# Patient Record
Sex: FEMALE | Race: BLACK OR AFRICAN AMERICAN | Employment: UNEMPLOYED | URBAN - METROPOLITAN AREA
[De-identification: names, ages, dates, MRNs, and addresses within clinical notes are randomized per-mention and may not be internally consistent; named-entity substitution may affect disease eponyms.]

---

## 2017-01-10 ENCOUNTER — NURSE NAVIGATOR (OUTPATIENT)
Dept: INTERNAL MEDICINE CLINIC | Age: 32
End: 2017-01-10

## 2017-01-10 NOTE — PROGRESS NOTES
This writer has attempted to reach pt for a final follow up to Bill discharge report. Pt was discharged from [facility] for Abscess of Right Buttock. Pt wasn't available nor has she responded to this writers get in touch letter. She has attended f/u with PCP and surgeon. This writer will close this episode.

## 2017-01-19 ENCOUNTER — APPOINTMENT (OUTPATIENT)
Dept: GENERAL RADIOLOGY | Age: 32
End: 2017-01-19
Attending: EMERGENCY MEDICINE
Payer: MEDICAID

## 2017-01-19 ENCOUNTER — HOSPITAL ENCOUNTER (EMERGENCY)
Age: 32
Discharge: HOME | End: 2017-01-19
Attending: EMERGENCY MEDICINE
Payer: MEDICAID

## 2017-01-19 VITALS
DIASTOLIC BLOOD PRESSURE: 66 MMHG | RESPIRATION RATE: 18 BRPM | SYSTOLIC BLOOD PRESSURE: 130 MMHG | BODY MASS INDEX: 21.82 KG/M2 | HEIGHT: 67 IN | OXYGEN SATURATION: 100 % | WEIGHT: 139 LBS | HEART RATE: 94 BPM | TEMPERATURE: 98.2 F

## 2017-01-19 DIAGNOSIS — S29.019A THORACIC MYOFASCIAL STRAIN, INITIAL ENCOUNTER: ICD-10-CM

## 2017-01-19 DIAGNOSIS — H65.112 ACUTE MUCOID OTITIS MEDIA OF LEFT EAR: Primary | ICD-10-CM

## 2017-01-19 PROCEDURE — 74011250637 HC RX REV CODE- 250/637: Performed by: EMERGENCY MEDICINE

## 2017-01-19 PROCEDURE — 99283 EMERGENCY DEPT VISIT LOW MDM: CPT

## 2017-01-19 PROCEDURE — 72072 X-RAY EXAM THORAC SPINE 3VWS: CPT

## 2017-01-19 RX ORDER — AZITHROMYCIN 250 MG/1
TABLET, FILM COATED ORAL
Qty: 6 TAB | Refills: 0 | Status: SHIPPED | OUTPATIENT
Start: 2017-01-19 | End: 2017-01-24

## 2017-01-19 RX ORDER — ACETAMINOPHEN 325 MG/1
650 TABLET ORAL ONCE
Status: COMPLETED | OUTPATIENT
Start: 2017-01-19 | End: 2017-01-19

## 2017-01-19 RX ORDER — METHOCARBAMOL 500 MG/1
500 TABLET, FILM COATED ORAL
Qty: 10 TAB | Refills: 0 | Status: SHIPPED | OUTPATIENT
Start: 2017-01-19 | End: 2017-01-24

## 2017-01-19 RX ADMIN — ACETAMINOPHEN 650 MG: 325 TABLET ORAL at 21:27

## 2017-01-20 NOTE — ED NOTES
Seen for complaints of pain to both ears x couple months. Denies drainage coming from ears. Denies decrease in hearing. Also complains of pain to mid upper back post fall last night. States she was running to bathroom, slipped and fell hitting back on floor. States she hit back of head. Denies pain to head or issues with memory, N+V. Pain to posterior neck and upper back. Denies other complaints. Emergency Department Nursing Plan of Care       The Nursing Plan of Care is developed from the Nursing assessment and Emergency Department Attending provider initial evaluation. The plan of care may be reviewed in the ED Provider note.     The Plan of Care was developed with the following considerations:   Patient / Family readiness to learn indicated by:verbalized understanding  Persons(s) to be included in education: patient  Barriers to Learning/Limitations:No    Signed     Tiffany Mirza    1/19/2017   2104

## 2017-01-20 NOTE — DISCHARGE INSTRUCTIONS
Scoliosis in Children: Care Instructions  Your Care Instructions  A normal spine--which is the line of bones going down your back--is usually straight or slightly curved. In scoliosis, the spine curves from side to side, often in an S or C shape. It may also be twisted. Scoliosis can affect adults, but it usually is found in children, especially girls between the ages of 8 and 12. Scoliosis can limit your child's growth. In very bad cases, your child's lungs may not be able to hold enough air. That can cause the heart to work harder to pump blood. Young people who have scoliosis usually do not have symptoms, but some may have back pain. If your child has mild scoliosis, he or she may need only to see a doctor every 4 to 6 months to make sure the curve is not getting worse. A child who has moderate scoliosis may need a brace. A brace usually stops the curve from getting worse, but it is not able to correct or straighten the spine. Scoliosis that is very bad may need surgery. Scoliosis and its treatment can be hard on a child. He or she may be embarrassed by wearing a brace. Think about taking your child to a scoliosis clinic, where other children are being treated. It may help your child cope with the condition. Follow-up care is a key part of your child's treatment and safety. Be sure to make and go to all appointments, and call your doctor if your child is having problems. It's also a good idea to know your child's test results and keep a list of the medicines your child takes. How can you care for your child at home? · Keep follow-up visits with your child's doctor. · If your child has a brace, follow instructions for wearing it. · Offer your child lots of hugs and emotional support. A child, especially a teen, who wears a brace may feel bad about himself or herself. If your child seems very sad or depressed for a long time, have your child talk to a counselor. · Be safe with medicines.  Read and follow all instructions on the label. ¨ If the doctor gave your child a prescription medicine for pain, give it as prescribed. ¨ If your child is not taking a prescription pain medicine, ask your doctor if your child can take an over-the-counter medicine. · Do not give your child two or more pain medicines at the same time unless the doctor told you to. Many pain medicines have acetaminophen, which is Tylenol. Too much acetaminophen (Tylenol) can be harmful. · Ask your doctor about what type of daily activity is safe for your child. When should you call for help? Call your doctor now or seek immediate medical care if:  · Your child has new or worse symptoms in arms, legs, chest, belly, or buttocks. Symptoms may include:  ¨ Numbness or tingling. ¨ Weakness. ¨ Pain. · Your child loses bladder or bowel control. Watch closely for changes in your child's health, and be sure to contact your doctor if:  · Your child is not getting better as expected. · Your child has a brace and has any problems wearing it. Where can you learn more? Go to http://roula-susan.info/. Enter C587 in the search box to learn more about \"Scoliosis in Children: Care Instructions. \"  Current as of: May 23, 2016  Content Version: 11.1  © 1363-1812 [employer]. Care instructions adapted under license by [employer] (which disclaims liability or warranty for this information). If you have questions about a medical condition or this instruction, always ask your healthcare professional. [place] [address] any warranty or liability for your use of this information. Bruises: Care Instructions  Your Care Instructions    Bruises occur when small blood vessels under the skin tear or rupture, most often from a twist, bump, or fall.  Blood leaks into tissues under the skin and causes a black-and-blue spot that often turns colors, including purplish black, reddish blue, or yellowish green, as the bruise heals. Bruises hurt, but most are not serious and will go away on their own within 2 to 4 weeks. Sometimes, gravity causes them to spread down the body. A leg bruise usually will take longer to heal than a bruise on the face or arms. Follow-up care is a key part of your treatment and safety. Be sure to make and go to all appointments, and call your doctor if you are having problems. Its also a good idea to know your test results and keep a list of the medicines you take. How can you care for yourself at home? · Take pain medicines exactly as directed. ¨ If the doctor gave you a prescription medicine for pain, take it as prescribed. ¨ If you are not taking a prescription pain medicine, ask your doctor if you can take an over-the-counter medicine. · Put ice or a cold pack on the area for 10 to 20 minutes at a time. Put a thin cloth between the ice and your skin. · If you can, prop up the bruised area on pillows as much as possible for the next few days. Try to keep the bruise above the level of your heart. When should you call for help? Call your doctor now or seek immediate medical care if:  · You have signs of infection, such as:  ¨ Increased pain, swelling, warmth, or redness. ¨ Red streaks leading from the bruise. ¨ Pus draining from the bruise. ¨ A fever. · You have a bruise on your leg and signs of a blood clot, such as:  ¨ Increasing redness and swelling along with warmth, tenderness, and pain in the bruised area. ¨ Pain in your calf, back of the knee, thigh, or groin. ¨ Redness and swelling in your leg or groin. · Your pain gets worse. Watch closely for changes in your health, and be sure to contact your doctor if:  · You do not get better as expected. Where can you learn more? Go to http://roula-susan.info/. Enter [phone] in the search box to learn more about \"Bruises: Care Instructions. \"  Current as of: May 27, 2016  Content Version: 11.1  © 3365-0430 [employer]. Care instructions adapted under license by [employer] (which disclaims liability or warranty for this information). If you have questions about a medical condition or this instruction, always ask your healthcare professional. [address] any warranty or liability for your use of this information. Ear Infection (Otitis Media): Care Instructions  Your Care Instructions    An ear infection may start with a cold and affect the middle ear (otitis media). It can hurt a lot. Most ear infections clear up on their own in a couple of days. Most often you will not need antibiotics. This is because many ear infections are caused by a virus. Antibiotics don't work against a virus. Regular doses of pain medicines are the best way to reduce your fever and help you feel better. Follow-up care is a key part of your treatment and safety. Be sure to make and go to all appointments, and call your doctor if you are having problems. It's also a good idea to know your test results and keep a list of the medicines you take. How can you care for yourself at home? · Take pain medicines exactly as directed. ¨ If the doctor gave you a prescription medicine for pain, take it as prescribed. ¨ If you are not taking a prescription pain medicine, take an over-the-counter medicine, such as acetaminophen (Tylenol), ibuprofen (Advil, Motrin), or naproxen (Aleve). Read and follow all instructions on the label. ¨ Do not take two or more pain medicines at the same time unless the doctor told you to. Many pain medicines have acetaminophen, which is Tylenol. Too much acetaminophen (Tylenol) can be harmful. · Plan to take a full dose of pain reliever before bedtime. Getting enough sleep will help you get better. · Try a warm, moist washcloth on the ear. It may help relieve pain. · If your doctor prescribed antibiotics, take them as directed.  Do not stop taking them just because you feel better. You need to take the full course of antibiotics. When should you call for help? Call your doctor now or seek immediate medical care if:  · You have new or increasing ear pain. · You have new or increasing pus or blood draining from your ear. · You have a fever with a stiff neck or a severe headache. Watch closely for changes in your health, and be sure to contact your doctor if:  · You have new or worse symptoms. · You are not getting better after taking an antibiotic for 2 days. Where can you learn more? Go to http://roula-susan.info/. Enter V529 in the search box to learn more about \"Ear Infection (Otitis Media): Care Instructions. \"  Current as of: July 29, 2016  Content Version: 11.1  © 6827-2831 [employer]. Care instructions adapted under license by [employer] (which disclaims liability or warranty for this information). If you have questions about a medical condition or this instruction, always ask your healthcare professional. [address] any warranty or liability for your use of this information.

## 2017-01-20 NOTE — ED PROVIDER NOTES
HPI Comments: Kurtis Mead is a 27 y.o. F with PMHx significant for Depression / Bipolar disorder / Anemia  who presents ambulatory to [facility] ED c/o BL ear pain radiating down her neck x 2 months. Pt  endorsing mid-back and left arm pain s/p fall yesterday in her shower. Pt states she was running to bathroom, slipped and fell hitting her head on the back of the floor. She reports taking ibuprofen with no relief of sx. Pt states she was previously in ED for otitis media for which she finished Clindamycin. She specifically denies any ear drainage, decrease in hearing, issues with memory, fevers, chills, nausea, vomiting, chest pain, shortness of breath, headache, rash, diarrhea, sweating or weight loss. PCP: Jacqueline Canales, NP    There are no other complaints, changes, or physical findings at this time. The history is provided by the patient.         Past Medical History:   Diagnosis Date    Abscess 12/1/2016    Anemia NEC     Bipolar 1 disorder ([address]     Breast tenderness      bilateral, since 11/2016, on and off     Depression     Dizziness     Gastrointestinal disorder      pancreatitis    Increased frequency of headaches     Kidney stones 10/2012    Nausea     Nipple discharge 2009     white/clear discharge since birth of child     Other ill-defined conditions(799.89)      sickle cell trait    Other ill-defined conditions(799.89)      anemia    Psychiatric disorder      depression, bi-polar    Psychotic disorder      bipolar 1    Sickle cell trait ([address]     Sickle cell trait ([address]     Stomach pain        Past Surgical History:   Procedure Laterality Date    Hx gyn       tubaligation    Hx gyn       ectopic pregnancy surgery    Hx other surgical       hx of blood transfusions         Family History:   Problem Relation Age of Onset    Heart Disease Father        Social History     Social History    Marital status: SINGLE     Spouse name: N/A    Number of children: N/A    Years of education: N/A     Occupational History    Not on file. Social History Main Topics    Smoking status: Current Every Day Smoker     Packs/day: 0.25     Years: 9.00    Smokeless tobacco: Never Used    Alcohol use No    Drug use: No      Comment: occasional    Sexual activity: Not Currently     Partners: Male     Birth control/ protection: None     Other Topics Concern    Not on file     Social History Narrative         ALLERGIES: Latex and Zyrtec [cetirizine]    Review of Systems   Constitutional: Negative. Negative for activity change, appetite change, chills, diaphoresis, fatigue, fever and unexpected weight change. HENT: Positive for ear pain. Negative for congestion, ear discharge, hearing loss, rhinorrhea, sneezing and voice change. Eyes: Negative. Negative for pain and visual disturbance. Respiratory: Negative. Negative for apnea, cough, choking, chest tightness and shortness of breath. Cardiovascular: Negative. Negative for chest pain and palpitations. Gastrointestinal: Negative. Negative for abdominal distention, abdominal pain, blood in stool, diarrhea, nausea and vomiting. Genitourinary: Negative. Negative for difficulty urinating, flank pain, frequency and urgency. No discharge   Musculoskeletal: Positive for back pain and neck pain. Negative for arthralgias, myalgias and neck stiffness. Positive for left arm pain   Skin: Negative. Negative for color change and rash. Neurological: Negative. Negative for dizziness, seizures, syncope, speech difficulty, weakness, numbness and headaches. Negative for issues with memory   Hematological: Negative for adenopathy. Psychiatric/Behavioral: Negative. Negative for agitation, behavioral problems, dysphoric mood and suicidal ideas. The patient is not nervous/anxious. All other systems reviewed and are negative.       Vitals:    01/19/17 2100   BP: 130/66   Pulse: 94   Resp: 18   Temp: 98.2 °F (36.8 °C)   SpO2: 100%   Weight: 63 kg (139 lb)   Height: 5' 7\" (1.702 m)            Physical Exam   Nursing note and vitals reviewed. Physical Examination: General appearance - WDWN, in no apparent distress  Head - NC/AT  Eyes - pupils equal, round  and reactive, extraocular eye movements intact, conj/sclera clear, anicteric  Mouth - mucous membranes moist, pharynx normal without lesions  Nose/Ears - nares clear, Right Tm & canals clear, Left TM has pus behind eardrum with erythema  Neck - supple, no significant adenopathy, trachea midline, no crepitus, c spine diffusely non-tender, no step offs  Chest - Normal respiratory effort, clear to auscultation bilaterally, no wheezes/rales/rhonchi  Heart - normal rate and regular rhythm, S1 and S2 normal, no murmurs, gallops, or rubs  Abdomen - soft, nontender, nondistended, nabs, no masses, guarding, rebound or rigidity  Neurological - alert, oriented, normal speech, cranial nerves intact, no focal motor findings, motor & sensory diffusely intact, normal gait  Extremities/MS - peripheral pulses normal, no pedal edema, all joints atraumatic, FROM, thoracic spine tenderness, no gross deformities  Skin - normal coloration and turgor, no rashes, no lesions or lacerations    MDM  Number of Diagnoses or Management Options  Acute mucoid otitis media of left ear:   Thoracic myofascial strain, initial encounter:   Diagnosis management comments: DDx: Muscle strain, Contusion, Fracture, Otitis media. Amount and/or Complexity of Data Reviewed  Tests in the radiology section of CPT®: ordered and reviewed  Review and summarize past medical records: yes    Patient Progress  Patient progress: stable    ED Course       Procedures    IMAGING RESULTS:  XR SPINE THORAC 3 V   Final Result   EXAM: XR SPINE THORAC 3 V     INDICATION: Back Pain     COMPARISON: None.     FINDINGS: AP, lateral and swimmers lateral views of the thoracic spine  demonstrate a mild sigmoid scoliosis.  There is no significant degenerative  change. No fracture or subluxation is identified.     IMPRESSION  IMPRESSION: No acute osseous abnormality. MEDICATIONS GIVEN:  Medications   acetaminophen (TYLENOL) tablet 650 mg (650 mg Oral Given 17)       IMPRESSION:  1. Acute mucoid otitis media of left ear    2. Thoracic myofascial strain, initial encounter        PLAN:  1. Discharge Medication List as of 2017  9:56 PM      START taking these medications    Details   azithromycin (ZITHROMAX Z-YANICK) 250 mg tablet As directed, Print, Disp-6 Tab, R-0      methocarbamol (ROBAXIN) 500 mg tablet Take 1 Tab by mouth two (2) times daily as needed for up to 5 days. , Print, Disp-10 Tab, R-0         CONTINUE these medications which have NOT CHANGED    Details   ibuprofen (MOTRIN) 800 mg tablet Take 1 Tab by mouth every eight (8) hours as needed for Pain., Normal, Disp-20 Tab, R-0      fluticasone (FLONASE) 50 mcg/actuation nasal spray 2 Sprays by Both Nostrils route daily. Indications: ALLERGIC RHINITIS, Normal, Disp-1 Bottle, R-11      loratadine (CLARITIN) 10 mg tablet Take 1 Tab by mouth daily. , Normal, Disp-30 Tab, R-11         STOP taking these medications       amoxicillin-clavulanate (AUGMENTIN) 250-62.5 mg/5 mL suspension Comments:   Reason for Stopping:         clindamycin (CLEOCIN) 300 mg capsule Comments:   Reason for Stopping:         acetaminophen-codeine (TYLENOL #3) 300-30 mg per tablet Comments:   Reason for Stopping:         cyproheptadine (PERIACTIN) 4 mg tablet Comments:   Reason for Stoppin.   Follow-up Information     Follow up With Details Comments Contact Info    Larry Fletcher NP Schedule an appointment as soon as possible for a visit As needed [address] [place] [address]  [phone]          Return to ED if worse     DISCHARGE NOTE:  10:02 PM  The patient's results have been reviewed with family and/or caregiver.  They verbally convey their understanding and agreement of the patient's signs, symptoms, diagnosis, treatment, and prognosis. They additionally agree to follow up as recommended in the discharge instructions or to return to the Emergency Room should the patient's condition change prior to their follow-up appointment. The family and/or caregiver verbally agrees with the care-plan and all of their questions have been answered. The discharge instructions have also been provided to the them along with educational information regarding the patient's diagnosis and a list of reasons why the patient would want to return to the ER prior to their follow-up appointment should their condition change. Written by Eliana Artis. Gurinder Huddleston, FRANDY Scribe, as dictated by Lisa Soto. Haven Mancia MD.        Attestations: This note is prepared by Eliana Artis. Chris, acting as Scribe for Lisa Soto. Haven Mancia, [facility] [place] [facility]. Haven Mancia MD: The scribe's documentation has been prepared under my direction and personally reviewed by me in its entirety. I confirm that the note above accurately reflects all work, treatment, procedures, and medical decision making performed by me.

## 2017-03-21 ENCOUNTER — OFFICE VISIT (OUTPATIENT)
Dept: INTERNAL MEDICINE CLINIC | Age: 32
End: 2017-03-21

## 2017-03-21 VITALS
TEMPERATURE: 97.2 F | OXYGEN SATURATION: 100 % | HEART RATE: 88 BPM | WEIGHT: 146.4 LBS | HEIGHT: 67 IN | SYSTOLIC BLOOD PRESSURE: 98 MMHG | BODY MASS INDEX: 22.98 KG/M2 | RESPIRATION RATE: 18 BRPM | DIASTOLIC BLOOD PRESSURE: 67 MMHG

## 2017-03-21 DIAGNOSIS — Z00.00 ADULT GENERAL MEDICAL EXAMINATION: Primary | ICD-10-CM

## 2017-03-21 DIAGNOSIS — R29.898 LEFT LEG WEAKNESS: ICD-10-CM

## 2017-03-21 DIAGNOSIS — Z91.81 HISTORY OF RECENT FALL: ICD-10-CM

## 2017-03-21 DIAGNOSIS — E61.1 IRON DEFICIENCY: ICD-10-CM

## 2017-03-21 RX ORDER — IBUPROFEN 800 MG/1
800 TABLET ORAL
Qty: 20 TAB | Refills: 0 | Status: SHIPPED | OUTPATIENT
Start: 2017-03-21 | End: 2017-12-03

## 2017-03-21 NOTE — PROGRESS NOTES
Donal Diaz is a 27 y.o. female and presents with ED Follow-up (1/19/17 ear and back pain RCHED); Annual Exam (with labs); and Ear Pain (pt states that she's still having ear infections. .. pt states that ENT is too far out)    Subjective:  Donal Diaz is a 27 y.o. female presenting for annual checkup. ROS: Feeling well. No dyspnea or chest pain on exertion. No abdominal pain, change in bowel habits, black or bloody stools. No urinary tract or prostatic symptoms. No neurological complaints. Specific concerns today: seen in ER in Jan for fall and ear pain. Informed of scoliosis and has intermittent right mid back discomfort at time. Referred to ENT, but feels it is too far out, got lost with directions. Also with concern for left leg weakness, had fall down steps recently. Discussed ADVANCED DIRECTIVE:yes  Advanced Directive on File: no    Review of Systems  Constitutional: negative for fevers, chills, anorexia and weight loss  Eyes:   negative for visual disturbance, drainage, and irritation  ENT:   + NC,hoarseness, and ear congestion. negative for tinnitus,sore throat   Respiratory:  + cough. negative for  hemoptysis, dyspnea, and wheezing  CV:   negative for chest pain, palpitations, and lower extremity edema  GI:   negative for nausea, vomiting, diarrhea, abdominal pain, and melena  Endo:               negative for polyuria,polydipsia,polyphagia, and heat intolerance  Genitourinary: negative for frequency, urgency, dysuria, retention, and hematuria  Integument:  negative for rash, ulcerations, and pruritus  Hematologic:  +easy bruising. negative for bleeding  Musculoskel: negative for muscle weakness,and joint pain/swelling  Neurological:  negative for headaches, dizziness, vertigo,and memory/gait problems  Behavl/Psych: + bipolar: anxiety, depression,and mood changes.  negative for feelings of suicide,     Past Medical History:   Diagnosis Date    Abscess 12/1/2016    Anemia NEC     Bipolar 1 disorder ([address]     Breast tenderness     bilateral, since 11/2016, on and off     Depression     Dizziness     Gastrointestinal disorder     pancreatitis    Increased frequency of headaches     Kidney stones 10/2012    Nausea     Nipple discharge 2009    white/clear discharge since birth of child     Other ill-defined conditions(799.89)     sickle cell trait    Other ill-defined conditions(799.89)     anemia    Psychiatric disorder     depression, bi-polar    Psychotic disorder     bipolar 1    Sickle cell trait ([address]     [facility]     Stomach pain      Past Surgical History:   Procedure Laterality Date    HX GYN      tubaligation    HX GYN      ectopic pregnancy surgery    HX OTHER SURGICAL      hx of blood transfusions     Social History     Social History    Marital status: SINGLE     Spouse name: N/A    Number of children: N/A    Years of education: N/A     Social History Main Topics    Smoking status: Current Every Day Smoker     Packs/day: 0.25     Years: 9.00    Smokeless tobacco: Never Used    Alcohol use No    Drug use: No      Comment: occasional    Sexual activity: Not Currently     Partners: Male     Birth control/ protection: None     Other Topics Concern    None     Social History Narrative     Family History   Problem Relation Age of Onset    Heart Disease Father      Current Outpatient Prescriptions   Medication Sig Dispense Refill    ibuprofen (MOTRIN) 800 mg tablet Take 1 Tab by mouth every eight (8) hours as needed for Pain. 20 Tab 0    fluticasone (FLONASE) 50 mcg/actuation nasal spray 2 Sprays by Both Nostrils route daily. Indications: ALLERGIC RHINITIS 1 Bottle 11    loratadine (CLARITIN) 10 mg tablet Take 1 Tab by mouth daily.  30 Tab 11     Allergies   Allergen Reactions    Latex Itching    Zyrtec [Cetirizine] Vertigo       Objective:  Visit Vitals    BP 98/67 (BP 1 Location: Right arm, BP Patient Position: Sitting)    Pulse 88    Temp 97.2 °F (36.2 °C) (Oral)    Resp 18    Ht 5' 7\" (1.702 m)    Wt 146 lb 6.4 oz (66.4 kg)    LMP 03/09/2017 (Approximate)    SpO2 100%    BMI 22.93 kg/m2     Wt Readings from Last 3 Encounters:   03/21/17 146 lb 6.4 oz (66.4 kg)   01/19/17 139 lb (63 kg)   12/21/16 136 lb (61.7 kg)     Physical Exam:   General appearance - alert, well appearing, and in no distress. Mental status - A/O x 4, aloof mood and flat affect. Poor eye contact. Head/Eyes- AT/NC. LARRY, EOMI, corneas normal, no foreign bodies. Required frequent redirection during EOM. Ears- TM injected bilaterally, no erythema, but serous drainage. Left with air fluid level  Nose- Septum midline, pink mucosa. Turbinates boggy and pale with dried drainage, no polyps or erythema. No sinus tenderness. Mouth/Throat - mucous membranes moist, pharynx normal without lesions. +PND. No tonsillar swelling or exudates. Neck -Supple ,normal CSP. FROM, non-tender. No adenopathy/thyromegaly. No JVD. Chest - CTA. Symmetric chest rise. No wheezing, rales or rhonchi. Heart - Normal rate, regular rhythm. Normal S1, S2. No MGR. Abdomen - Soft,non-distended. Normoactive BS in all quadrants. NT, no mass, rebound, or HSM   Ext- Radial, DP pulses, 2+ bilaterally. No pedal edema, clubbing, or cyanosis. Skin- Normal for ethnicity, warm, and dry. No hyperpigmentation, ulcerations, or suspicious lesions  Neuro - Normal speech, no focal findings. Normal strength and lean muscle with little bulk. Coordination and gait normal.  Legs weaker than expected for age and weight, left slightly weaker. Back- alignment midline. No spinal or paraspinal tenderness. No CVAT. Assessment/Plan:  Reprinted lab slip from Nov for collection. Xray of lower back and knee to r/o ortho cause for fall and weakness. May need PT to help build muscle. Reviewed some strength training maneuvers also. Encouraged restart of antihistamines and f/u with ENT.   Medication Side Effects and Warnings were discussed with patient: yes   Patient Labs were reviewed: yes  Patient Past Records were reviewed: yes  See orders below      ICD-10-CM ICD-9-CM    1. Adult general medical examination Z00.00 V70.9    2. Left leg weakness M62.81 729.89 ibuprofen (MOTRIN) 800 mg tablet   3. History of recent fall Z91.81 V15.88 XR KNEE LT MAX 2 VWS      XR SPINE LUMB MIN 4 V   4. Iron deficiency E61.1 280.9      Orders Placed This Encounter    XR KNEE LT MAX 2 VWS     Standing Status:   Future     Standing Expiration Date:   4/21/2018     Order Specific Question:   Reason for Exam     Answer:   left leg weakness     Order Specific Question:   Is Patient Allergic to Contrast Dye? Answer:   No     Order Specific Question:   Is Patient Pregnant? Answer:   No    XR SPINE LUMB MIN 4 V     Standing Status:   Future     Standing Expiration Date:   4/21/2018     Order Specific Question:   Reason for Exam     Answer:   left leg pain and weakness with fall     Order Specific Question:   Is Patient Allergic to Contrast Dye? Answer:   No     Order Specific Question:   Is Patient Pregnant? Answer:   No    ibuprofen (MOTRIN) 800 mg tablet     Sig: Take 1 Tab by mouth every eight (8) hours as needed for Pain. Dispense:  20 Tab     Refill:  0     Follow-up Disposition:  Return in about 3 months (around 6/21/2017) for 3 month chronic otitis f/u. Cecilio Wei expressed understanding of plan. An After Visit Summary was offered/printed and given to the patient.

## 2017-03-21 NOTE — PROGRESS NOTES
Pt is here for   Chief Complaint   Patient presents with   [employer] ED Follow-up     1/19/17 ear and back pain RCHED    Annual Exam     with labs    Ear Pain     pt states that she's still having ear infections. .. pt states that ENT is too far out     Pt states pain level is a 3 right breast    Pt states she gets a sharp pain in the left foot, pt states that's her left leg gives out often and pain in the left arm. . Pt states that's she's also having back pains. ..

## 2017-03-21 NOTE — PATIENT INSTRUCTIONS
Learning About Medical Power of   What is a medical power of ? A medical power of  is one type of the legal forms called advance directives. It lets you decide who you want to make treatment decisions for you if you cannot speak or decide for yourself. The person you choose is called your health care agent. Another type of advance directive is a living will. It lets you write down what kinds of treatment or life support you want or do not want. What should you think about when choosing a health care agent? Choose your health care agent carefully. This person may or may not be a family member. Talk to the person before you make your final decision. Make sure he or she is comfortable with this responsibility. It's a good idea to choose someone who:  · Is at least 25years old. · Knows you well and understands what makes life meaningful for you. · Understands your Alevism and moral values. · Will do what you want, not what he or she wants. · Will be able to make difficult choices at a stressful time. · Will be able to refuse or stop treatment, if that is what you would want, even if you could die. · Will be firm and confident with health professionals if needed. · Will ask questions to get necessary information. · Lives near you or agrees to travel to you if needed. Your family may help you make medical decisions while you can still be part of that process. But it is important to choose one person to be your health care agent in case you are not able to make decisions for yourself. If you don't fill out the legal form and name a health care agent, the decisions your family can make may be limited. Who will make decisions for you if you do not have a health care agent? If you don't have a health care agent or a living will, your family members may disagree about your medical care.  And then some medical professionals who may not know you as well might have to make decisions for you. In some cases, a  makes the decisions. When you name a health care agent, it is very clear who has the power to make health decisions for you. How do you name a health care agent? You name your health care agent on a legal form. It is usually called a medical power of . Ask your hospital, state bar association, or office on aging where to find these forms. You must sign the form to make it legal. Some states require you to get the form notarized. This means that a person called a  watches you sign the form and then he or she signs the form. Some states also require that two or more witnesses sign the form. Be sure to tell your family members and doctors who your health care agent is. Keep your forms in a safe place. But make sure that your loved ones know where the forms are. This could be in your desk where you keep other important papers. Where can you learn more? Go to http://roulatok tok toksusan.info/. Enter [id number] in the search box to learn more about \"Learning About [address]. \"  Current as of: February 24, 2016  Content Version: 11.1  © 8191-5449 [employer]. Care instructions adapted under license by [employer] (which disclaims liability or warranty for this information). If you have questions about a medical condition or this instruction, always ask your healthcare professional. [place] [address] any warranty or liability for your use of this information. Advance Directives: Care Instructions  Your Care Instructions  An advance directive is a legal way to state your wishes at the end of your life. It tells your family and your doctor what to do if you can no longer say what you want. There are two main types of advance directives. You can change them any time that your wishes change.   · A living will tells your family and your doctor your wishes about life support and other treatment. · A medical power of  lets you name a person to make treatment decisions for you when you can't speak for yourself. This person is called a health care agent. If you do not have an advance directive, decisions about your medical care may be made by a doctor or a  who doesn't know you. It may help to think of an advance directive as a gift to the people who care for you. If you have one, they won't have to make tough decisions by themselves. Follow-up care is a key part of your treatment and safety. Be sure to make and go to all appointments, and call your doctor if you are having problems. It's also a good idea to know your test results and keep a list of the medicines you take. How can you care for yourself at home? · Discuss your wishes with your loved ones and your doctor. This way, there are no surprises. · Many states have a unique form. Or you might use a universal form that has been approved by many states. This kind of form can sometimes be completed and stored online. Your electronic copy will then be available wherever you have a connection to the Internet. In most cases, doctors will respect your wishes even if you have a form from a different state. · You don't need a  to do an advance directive. But you may want to get legal advice. · Think about these questions when you prepare an advance directive:  ¨ Who do you want to make decisions about your medical care if you are not able to? Many people choose a family member, close friend, or doctor. ¨ Do you know enough about life support methods that might be used? If not, talk to your doctor so you understand. ¨ What are you most afraid of that might happen? You might be afraid of having pain, losing your independence, or being kept alive by machines. ¨ Where would you prefer to die? Choices include your home, a hospital, or a nursing home.   ¨ Would you like to have information about hospice care to support you and your family? ¨ Do you want to donate organs when you die? ¨ Do you want certain Scientology practices performed before you die? If so, put your wishes in the advance directive. · Read your advance directive every year, and make changes as needed. When should you call for help? Be sure to contact your doctor if you have any questions. Where can you learn more? Go to http://roula-susan.info/. Enter R264 in the search box to learn more about \"Advance Directives: Care Instructions. \"  Current as of: February 24, 2016  Content Version: 11.1  © 6932-4228 [employer]. Care instructions adapted under license by [employer] (which disclaims liability or warranty for this information). If you have questions about a medical condition or this instruction, always ask your healthcare professional. [address] any warranty or liability for your use of this information. Preventing Falls: Care Instructions  Your Care Instructions  Getting around your home safely can be a challenge if you have injuries or health problems that make it easy for you to fall. Loose rugs and furniture in walkways are among the dangers for many older people who have problems walking or who have poor eyesight. People who have conditions such as arthritis, osteoporosis, or dementia also have to be careful not to fall. You can make your home safer with a few simple measures. Follow-up care is a key part of your treatment and safety. Be sure to make and go to all appointments, and call your doctor if you are having problems. It's also a good idea to know your test results and keep a list of the medicines you take. How can you care for yourself at home? Taking care of yourself  · You may get dizzy if you do not drink enough water. To prevent dehydration, drink plenty of fluids, enough so that your urine is light yellow or clear like water.  Choose water and other caffeine-free clear liquids. If you have kidney, heart, or liver disease and have to limit fluids, talk with your doctor before you increase the amount of fluids you drink. · Exercise regularly to improve your strength, muscle tone, and balance. Walk if you can. Swimming may be a good choice if you cannot walk easily. · Have your vision and hearing checked each year or any time you notice a change. If you have trouble seeing and hearing, you might not be able to avoid objects and could lose your balance. · Know the side effects of the medicines you take. Ask your doctor or pharmacist whether the medicines you take can affect your balance. Sleeping pills or sedatives can affect your balance. · Limit the amount of alcohol you drink. Alcohol can impair your balance and other senses. · Ask your doctor whether calluses or corns on your feet need to be removed. If you wear loose-fitting shoes because of calluses or corns, you can lose your balance and fall. · Talk to your doctor if you have numbness in your feet. Preventing falls at home  · Remove raised doorway thresholds, throw rugs, and clutter. Repair loose carpet or raised areas in the floor. · Move furniture and electrical cords to keep them out of walking paths. · Use nonskid floor wax, and wipe up spills right away, especially on ceramic tile floors. · If you use a walker or cane, put rubber tips on it. If you use crutches, clean the bottoms of them regularly with an abrasive pad, such as steel wool. · Keep your house well lit, especially [employer], and outside walkways. Use night-lights in areas such as hallways and bathrooms. Add extra light switches or use remote switches (such as switches that go on or off when you clap your hands) to make it easier to turn lights on if you have to get up during the night. · Install sturdy handrails on stairways.   · Move items in your cabinets so that the things you use a lot are on the lower shelves (about waist level). · Keep a cordless phone and a flashlight with new batteries by your bed. If possible, put a phone in each of the main rooms of your house, or carry a cell phone in case you fall and cannot reach a phone. Or, you can wear a device around your neck or wrist. You push a button that sends a signal for help. · Wear low-heeled shoes that fit well and give your feet good support. Use footwear with nonskid soles. Check the heels and soles of your shoes for wear. Repair or replace worn heels or soles. · Do not wear socks without shoes on wood floors. · Walk on the grass when the sidewalks are slippery. If you live in an area that gets snow and ice in the winter, sprinkle salt on slippery steps and sidewalks. Preventing falls in the bath  · Install grab bars and nonskid mats inside and outside your shower or tub and near the toilet and sinks. · Use shower chairs and bath benches. · Use a hand-held shower head that will allow you to sit while showering. · Get into a tub or shower by putting the weaker leg in first. Get out of a tub or shower with your strong side first.  · Repair loose toilet seats and consider installing a raised toilet seat to make getting on and off the toilet easier. · Keep your bathroom door unlocked while you are in the shower. Where can you learn more? Go to http://roula-susan.info/. Enter [id number] [id number] [phone] in the search box to learn more about \"Preventing Falls: Care Instructions. \"  Current as of: August 4, 2016  Content Version: 11.1  © 7301-5202 [employer]. Care instructions adapted under license by [employer] (which disclaims liability or warranty for this information). If you have questions about a medical condition or this instruction, always ask your healthcare professional. [address] any warranty or liability for your use of this information.     Eat a balanced diet, low-carb, low-salt AND exercise at least 150 minutes per week of moderate activity. If you begin to feel short of breath, dizzy, lightheaded, or have chest pain; STOP. If your symptoms DO NOT resolve after several minutes, DO NOT resume activity; you may need to call the office, report to an urgent care facility, or ER for chest pain. Try some STRENGTH TRAINING, like using leg weights to build thigh muscles. Use Dove or Aveeno soap. Apply vaseline like ointment to affected areas or raw shea butter blended with oil (flaxseed, olive, or coconut). Only use steroids when you have redness and itching at the first sign, then stop and start using other moisturizer. Avoid hot showers and pat dry.

## 2017-03-21 NOTE — MR AVS SNAPSHOT
Visit Information Date & Time Provider Department Dept. Phone Encounter #  
 3/21/2017  2:40 PM Phillip Urena NP [address] [facility] [phone] [id number] Follow-up Instructions Return in about 3 months (around 6/21/2017) for 3 month chronic otitis f/u. Upcoming Health Maintenance Date Due  
 PAP AKA CERVICAL CYTOLOGY 9/1/2019 DTaP/Tdap/Td series (2 - Td) 11/23/2026 Allergies as of 3/21/2017  Review Complete On: 3/21/2017 By: Phillip Urena NP Severity Noted Reaction Type Reactions Latex  05/12/2011   Topical Itching Zyrtec [Cetirizine]  05/20/2016    Vertigo Current Immunizations  Reviewed on 9/30/2015 Name Date Influenza Vaccine 10/1/2012 Influenza Vaccine Intradermal PF 10/29/2015  2:00 PM  
 Influenza Vaccine Whole 9/1/2012 Pneumococcal Vaccine (Unspecified Type) 10/1/2009 TB Skin Test (PPD) 10/1/2012 Not reviewed this visit You Were Diagnosed With   
  
 Codes Comments Adult general medical examination    -  Primary ICD-10-CM: Z00.00 ICD-9-CM: V70.9 Left leg weakness     ICD-10-CM: M62.81 ICD-9-CM: 729.89 History of recent fall     ICD-10-CM: Z91.81 
ICD-9-CM: V15.88 Iron deficiency     ICD-10-CM: E61.1 ICD-9-CM: 280.9 Vitals BP Pulse Temp Resp Height(growth percentile) Weight(growth percentile) 98/67 (BP 1 Location: Right arm, BP Patient Position: Sitting) 88 97.2 °F (36.2 °C) (Oral) 18 5' 7\" (1.702 m) 146 lb 6.4 oz (66.4 kg) LMP SpO2 BMI OB Status Smoking Status 03/09/2017 (Approximate) 100% 22.93 kg/m2 Having regular periods Current Every Day Smoker Vitals History BMI and BSA Data Body Mass Index Body Surface Area  
 22.93 kg/m 2 1.77 m 2 Preferred Pharmacy Pharmacy Name Phone Kristin [facility] [address] , [address] [facility] [phone] Your Updated Medication List  
  
   
 This list is accurate as of: 3/21/17  3:51 PM.  Always use your most recent med list.  
  
  
  
  
 fluticasone 50 mcg/actuation nasal spray Commonly known as:  Ronel Reges 2 Sprays by Both Nostrils route daily. Indications: ALLERGIC RHINITIS  
  
 ibuprofen 800 mg tablet Commonly known as:  MOTRIN Take 1 Tab by mouth every eight (8) hours as needed for Pain.  
  
 loratadine 10 mg tablet Commonly known as:  Shaun Sor Take 1 Tab by mouth daily. Prescriptions Sent to Pharmacy Refills  
 ibuprofen (MOTRIN) 800 mg tablet 0 Sig: Take 1 Tab by mouth every eight (8) hours as needed for Pain. Class: Normal  
 Pharmacy: [address], [address] [place] [address] #: [phone] Route: Oral  
  
Follow-up Instructions Return in about 3 months (around 6/21/2017) for 3 month chronic otitis f/u. To-Do List   
 03/21/2017 Imaging:  XR KNEE LT MAX 2 VWS   
  
 03/21/2017 Imaging:  XR SPINE LUMB MIN 4 V Patient Instructions Learning About [address] What is a medical power of ? A medical power of  is one type of the legal forms called advance directives. It lets you decide who you want to make treatment decisions for you if you cannot speak or decide for yourself. The person you choose is called your health care agent. Another type of advance directive is a living will. It lets you write down what kinds of treatment or life support you want or do not want. What should you think about when choosing a health care agent? Choose your health care agent carefully. This person may or may not be a family member. Talk to the person before you make your final decision. Make sure he or she is comfortable with this responsibility. It's a good idea to choose someone who: · Is at least 25years old. · Knows you well and understands what makes life meaningful for you. · Understands your Latter day and moral values. · Will do what you want, not what he or she wants. · Will be able to make difficult choices at a stressful time. · Will be able to refuse or stop treatment, if that is what you would want, even if you could die. · Will be firm and confident with health professionals if needed. · Will ask questions to get necessary information. · Lives near you or agrees to travel to you if needed. Your family may help you make medical decisions while you can still be part of that process. But it is important to choose one person to be your health care agent in case you are not able to make decisions for yourself. If you don't fill out the legal form and name a health care agent, the decisions your family can make may be limited. Who will make decisions for you if you do not have a health care agent? If you don't have a health care agent or a living will, your family members may disagree about your medical care. And then some medical professionals who may not know you as well might have to make decisions for you. In some cases, a  makes the decisions. When you name a health care agent, it is very clear who has the power to make health decisions for you. How do you name a health care agent? You name your health care agent on a legal form. It is usually called a medical power of . Ask your hospital, state bar association, or office on aging where to find these forms. You must sign the form to make it legal. Some states require you to get the form notarized. This means that a person called a  watches you sign the form and then he or she signs the form. Some states also require that two or more witnesses sign the form. Be sure to tell your family members and doctors who your health care agent is. Keep your forms in a safe place. But make sure that your loved ones know where the forms are. This could be in your desk where you keep other important papers. Where can you learn more? Go to http://roula-susan.info/. Enter [id number] in the search box to learn more about \"Learning About [address]. \" Current as of: February 24, 2016 Content Version: 11.1 © 8993-3450 [email or web address]. Care instructions adapted under license by [employer] (which disclaims liability or warranty for this information). If you have questions about a medical condition or this instruction, always ask your healthcare professional. [address] any warranty or liability for your use of this information. Advance Directives: Care Instructions Your Care Instructions An advance directive is a legal way to state your wishes at the end of your life. It tells your family and your doctor what to do if you can no longer say what you want. There are two main types of advance directives. You can change them any time that your wishes change. · A living will tells your family and your doctor your wishes about life support and other treatment. · A medical power of  lets you name a person to make treatment decisions for you when you can't speak for yourself. This person is called a health care agent. If you do not have an advance directive, decisions about your medical care may be made by a doctor or a  who doesn't know you. It may help to think of an advance directive as a gift to the people who care for you. If you have one, they won't have to make tough decisions by themselves. Follow-up care is a key part of your treatment and safety. Be sure to make and go to all appointments, and call your doctor if you are having problems. It's also a good idea to know your test results and keep a list of the medicines you take. How can you care for yourself at home? · Discuss your wishes with your loved ones and your doctor. This way, there are no surprises. · Many states have a unique form.  Or you might use a universal form that has been approved by many states. This kind of form can sometimes be completed and stored online. Your electronic copy will then be available wherever you have a connection to the Internet. In most cases, doctors will respect your wishes even if you have a form from a different state. · You don't need a  to do an advance directive. But you may want to get legal advice. · Think about these questions when you prepare an advance directive: ¨ Who do you want to make decisions about your medical care if you are not able to? Many people choose a family member, close friend, or doctor. ¨ Do you know enough about life support methods that might be used? If not, talk to your doctor so you understand. ¨ What are you most afraid of that might happen? You might be afraid of having pain, losing your independence, or being kept alive by machines. ¨ Where would you prefer to die? Choices include your home, a hospital, or a nursing home. ¨ Would you like to have information about hospice care to support you and your family? ¨ Do you want to donate organs when you die? ¨ Do you want certain [employer] practices performed before you die? If so, put your wishes in the advance directive. · Read your advance directive every year, and make changes as needed. When should you call for help? Be sure to contact your doctor if you have any questions. Where can you learn more? Go to http://roula-susan.info/. Enter R264 in the search box to learn more about \"Advance Directives: Care Instructions. \" Current as of: February 24, 2016 Content Version: 11.1 © 6703-8079 [employer]wise[employer] Inc[employer]. Care instructions adapted under license by [employer] (which disclaims liability or warranty for this information).  If you have questions about a medical condition or this instruction, always ask your healthcare professional. [address] any warranty or liability for your use of this information. Preventing Falls: Care Instructions Your Care Instructions Getting around your home safely can be a challenge if you have injuries or health problems that make it easy for you to fall. Loose rugs and furniture in walkways are among the dangers for many older people who have problems walking or who have poor eyesight. People who have conditions such as arthritis, osteoporosis, or dementia also have to be careful not to fall. You can make your home safer with a few simple measures. Follow-up care is a key part of your treatment and safety. Be sure to make and go to all appointments, and call your doctor if you are having problems. It's also a good idea to know your test results and keep a list of the medicines you take. How can you care for yourself at home? Taking care of yourself · You may get dizzy if you do not drink enough water. To prevent dehydration, drink plenty of fluids, enough so that your urine is light yellow or clear like water. Choose water and other caffeine-free clear liquids. If you have kidney, heart, or liver disease and have to limit fluids, talk with your doctor before you increase the amount of fluids you drink. · Exercise regularly to improve your strength, muscle tone, and balance. Walk if you can. Swimming may be a good choice if you cannot walk easily. · Have your vision and hearing checked each year or any time you notice a change. If you have trouble seeing and hearing, you might not be able to avoid objects and could lose your balance. · Know the side effects of the medicines you take. Ask your doctor or pharmacist whether the medicines you take can affect your balance. Sleeping pills or sedatives can affect your balance. · Limit the amount of alcohol you drink. Alcohol can impair your balance and other senses. · Ask your doctor whether calluses or corns on your feet need to be removed.  If you wear loose-fitting shoes because of calluses or corns, you can lose your balance and fall. · Talk to your doctor if you have numbness in your feet. Preventing falls at home · Remove raised doorway thresholds, throw rugs, and clutter. Repair loose carpet or raised areas in the floor. · Move furniture and electrical cords to keep them out of walking paths. · Use nonskid floor wax, and wipe up spills right away, especially on ceramic tile floors. · If you use a walker or cane, put rubber tips on it. If you use crutches, clean the bottoms of them regularly with an abrasive pad, such as steel wool. · Keep your house well lit, especially Fernandez Fake, and outside walkways. Use night-lights in areas such as hallways and bathrooms. Add extra light switches or use remote switches (such as switches that go on or off when you clap your hands) to make it easier to turn lights on if you have to get up during the night. · Install sturdy handrails on stairways. · Move items in your cabinets so that the things you use a lot are on the lower shelves (about waist level). · Keep a cordless phone and a flashlight with new batteries by your bed. If possible, put a phone in each of the main rooms of your house, or carry a cell phone in case you fall and cannot reach a phone. Or, you can wear a device around your neck or wrist. You push a button that sends a signal for help. · Wear low-heeled shoes that fit well and give your feet good support. Use footwear with nonskid soles. Check the heels and soles of your shoes for wear. Repair or replace worn heels or soles. · Do not wear socks without shoes on wood floors. · Walk on the grass when the sidewalks are slippery. If you live in an area that gets snow and ice in the winter, sprinkle salt on slippery steps and sidewalks. Preventing falls in the bath · Install grab bars and nonskid mats inside and outside your shower or tub and near the toilet and sinks. · Use shower chairs and bath benches. · Use a hand-held shower head that will allow you to sit while showering. · Get into a tub or shower by putting the weaker leg in first. Get out of a tub or shower with your strong side first. 
· Repair loose toilet seats and consider installing a raised toilet seat to make getting on and off the toilet easier. · Keep your bathroom door unlocked while you are in the shower. Where can you learn more? Go to http://roula-susan.info/. Enter [id number] [id number] [phone] in the search box to learn more about \"Preventing Falls: Care Instructions. \" Current as of: August 4, 2016 Content Version: 11.1 © 1876-4204 [employer]. Care instructions adapted under license by [employer] (which disclaims liability or warranty for this information). If you have questions about a medical condition or this instruction, always ask your healthcare professional. [place] [address] any warranty or liability for your use of this information. Eat a balanced diet, low-carb, low-salt AND exercise at least 150 minutes per week of moderate activity. If you begin to feel short of breath, dizzy, lightheaded, or have chest pain; STOP. If your symptoms DO NOT resolve after several minutes, DO NOT resume activity; you may need to call the office, report to an urgent care facility, or ER for chest pain. Try some STRENGTH TRAINING, like using leg weights to build thigh muscles. Use Dove or Aveeno soap. Apply vaseline like ointment to affected areas or raw shea butter blended with oil (flaxseed, olive, or coconut). Only use steroids when you have redness and itching at the first sign, then stop and start using other moisturizer. Avoid hot showers and pat dry. Introducing [facility]! [employer] introduces [employer] patient portal. Now you can access parts of your medical record, email your doctor's office, and request medication refills online.    
 
1. In your internet browser, go to [email or web address] 2. Click on the First Time User? Click Here link in the Sign In box. You will see the New Member Sign Up page. 3. Enter your [employer] Access Code exactly as it appears below. You will not need to use this code after youve completed the sign-up process. If you do not sign up before the expiration date, you must request a new code. · [employer] Access Code: [email or web address] Expires: 6/19/2017  2:51 PM 
 
4. Enter the last four digits of your Social Security Number (xxxx) and Date of Birth (mm/dd/yyyy) as indicated and click Submit. You will be taken to the next sign-up page. 5. Create a [employer] ID. This will be your [employer] login ID and cannot be changed, so think of one that is secure and easy to remember. 6. Create a [employer] password. You can change your password at any time. 7. Enter your Password Reset Question and Answer. This can be used at a later time if you forget your password. 8. Enter your e-mail address. You will receive e-mail notification when new information is available in [address] 9. Click Sign Up. You can now view and download portions of your medical record. 10. Click the Download Summary menu link to download a portable copy of your medical information. If you have questions, please visit the Frequently Asked Questions section of the [employer] website. Remember, [employer] is NOT to be used for urgent needs. For medical emergencies, dial 911. Now available from your iPhone and Android! Please provide this summary of care documentation to your next provider. Your primary care clinician is listed as BOB Goncalves. If you have any questions after today's visit, please call [phone].

## 2017-12-03 ENCOUNTER — HOSPITAL ENCOUNTER (EMERGENCY)
Age: 32
Discharge: HOME | End: 2017-12-03
Attending: EMERGENCY MEDICINE
Payer: MEDICAID

## 2017-12-03 VITALS
HEART RATE: 85 BPM | OXYGEN SATURATION: 100 % | DIASTOLIC BLOOD PRESSURE: 63 MMHG | BODY MASS INDEX: 22.76 KG/M2 | HEIGHT: 67 IN | TEMPERATURE: 98.7 F | SYSTOLIC BLOOD PRESSURE: 126 MMHG | WEIGHT: 145 LBS | RESPIRATION RATE: 14 BRPM

## 2017-12-03 DIAGNOSIS — B96.89 BV (BACTERIAL VAGINOSIS): ICD-10-CM

## 2017-12-03 DIAGNOSIS — A59.9 TRICHOMONAL INFECTION: ICD-10-CM

## 2017-12-03 DIAGNOSIS — N76.0 BV (BACTERIAL VAGINOSIS): ICD-10-CM

## 2017-12-03 DIAGNOSIS — R31.9 HEMATURIA, UNSPECIFIED TYPE: ICD-10-CM

## 2017-12-03 DIAGNOSIS — N72 CERVICITIS: Primary | ICD-10-CM

## 2017-12-03 LAB
APPEARANCE UR: ABNORMAL
BACTERIA URNS QL MICRO: ABNORMAL /HPF
BILIRUB UR QL: NEGATIVE
CLUE CELLS VAG QL WET PREP: NORMAL
COLOR UR: ABNORMAL
EPITH CASTS URNS QL MICRO: ABNORMAL /LPF
GLUCOSE UR STRIP.AUTO-MCNC: NEGATIVE MG/DL
HCG UR QL: NEGATIVE
HGB UR QL STRIP: ABNORMAL
KETONES UR QL STRIP.AUTO: ABNORMAL MG/DL
KOH PREP SPEC: NORMAL
LEUKOCYTE ESTERASE UR QL STRIP.AUTO: ABNORMAL
NITRITE UR QL STRIP.AUTO: NEGATIVE
PH UR STRIP: 7.5 [PH] (ref 5–8)
PROT UR STRIP-MCNC: 30 MG/DL
RBC #/AREA URNS HPF: >100 /HPF (ref 0–5)
SERVICE CMNT-IMP: NORMAL
SP GR UR REFRACTOMETRY: 1.01 (ref 1–1.03)
T VAGINALIS VAG QL WET PREP: NORMAL
UA: UC IF INDICATED,UAUC: ABNORMAL
UROBILINOGEN UR QL STRIP.AUTO: 2 EU/DL (ref 0.2–1)
WBC URNS QL MICRO: ABNORMAL /HPF (ref 0–4)

## 2017-12-03 PROCEDURE — 96372 THER/PROPH/DIAG INJ SC/IM: CPT

## 2017-12-03 PROCEDURE — 87210 SMEAR WET MOUNT SALINE/INK: CPT | Performed by: EMERGENCY MEDICINE

## 2017-12-03 PROCEDURE — 87086 URINE CULTURE/COLONY COUNT: CPT | Performed by: EMERGENCY MEDICINE

## 2017-12-03 PROCEDURE — 74011250637 HC RX REV CODE- 250/637: Performed by: EMERGENCY MEDICINE

## 2017-12-03 PROCEDURE — 99284 EMERGENCY DEPT VISIT MOD MDM: CPT

## 2017-12-03 PROCEDURE — 87491 CHLMYD TRACH DNA AMP PROBE: CPT | Performed by: EMERGENCY MEDICINE

## 2017-12-03 PROCEDURE — 81025 URINE PREGNANCY TEST: CPT

## 2017-12-03 PROCEDURE — 81001 URINALYSIS AUTO W/SCOPE: CPT | Performed by: EMERGENCY MEDICINE

## 2017-12-03 PROCEDURE — 74011000250 HC RX REV CODE- 250: Performed by: EMERGENCY MEDICINE

## 2017-12-03 PROCEDURE — 74011250636 HC RX REV CODE- 250/636: Performed by: EMERGENCY MEDICINE

## 2017-12-03 RX ORDER — DOXYCYCLINE HYCLATE 100 MG
100 TABLET ORAL 2 TIMES DAILY
Qty: 14 TAB | Refills: 0 | Status: SHIPPED | OUTPATIENT
Start: 2017-12-03 | End: 2017-12-10

## 2017-12-03 RX ORDER — AZITHROMYCIN 250 MG/1
1000 TABLET, FILM COATED ORAL
Status: COMPLETED | OUTPATIENT
Start: 2017-12-03 | End: 2017-12-03

## 2017-12-03 RX ORDER — IBUPROFEN 400 MG/1
800 TABLET ORAL
Status: DISCONTINUED | OUTPATIENT
Start: 2017-12-03 | End: 2017-12-03 | Stop reason: HOSPADM

## 2017-12-03 RX ORDER — METRONIDAZOLE 500 MG/1
500 TABLET ORAL 2 TIMES DAILY
Qty: 14 TAB | Refills: 0 | Status: SHIPPED | OUTPATIENT
Start: 2017-12-03 | End: 2017-12-10

## 2017-12-03 RX ADMIN — AZITHROMYCIN 1000 MG: 250 TABLET, FILM COATED ORAL at 19:47

## 2017-12-03 RX ADMIN — LIDOCAINE HYDROCHLORIDE 250 MG: 10 INJECTION, SOLUTION EPIDURAL; INFILTRATION; INTRACAUDAL; PERINEURAL at 19:47

## 2017-12-03 NOTE — ED TRIAGE NOTES
Pt c/o blood in urine x 1 episode today with urinary frequency and lower vaginal pain x 2 weeks, denies vaginal bleeding

## 2017-12-04 LAB
C TRACH DNA SPEC QL NAA+PROBE: NEGATIVE
N GONORRHOEA DNA SPEC QL NAA+PROBE: NEGATIVE
SAMPLE TYPE: NORMAL
SERVICE CMNT-IMP: NORMAL
SPECIMEN SOURCE: NORMAL

## 2017-12-04 NOTE — ED PROVIDER NOTES
EMERGENCY DEPARTMENT HISTORY AND PHYSICAL EXAM      Date: 12/3/2017  Patient Name: Jamshid Car    History of Presenting Illness     Chief Complaint   Patient presents with    Blood in Urine    Vaginal Pain       History Provided By: Patient    HPI: Jamshid Car, 34 y.o. female with PMHx significant for bipolar 1, depression, anemia, sickle cell trait, presents ambulatory to the ED with cc of acute onset hematuria x this evening. Pt reports feeling an acute \"sharp\" pain to her pelvis and she needed to use the restroom. When she went she noted blood mixed in with her urine. Pt was concerned stating the last time she had hematuria she was passing clots and needed a transfusion. Pt denies seeing any clots today but is still concerned. She reports her LMP started on October 13th and lasted all of October and all of November. Today pt denies any visible clots in her urine, gross blood in her urine, dysuria, vaginal discharge, vaginal pain, abdominal pain, nausea, vomiting, diarrhea, CP, SOB. Social Hx: + Tobacco (0.25 ppd), - EtOH (-), + illicit drug use (occasional use, unspecified what)    PCP: Santy Barillas NP    There are no other complaints, changes, or physical findings at this time. Current Facility-Administered Medications   Medication Dose Route Frequency Provider Last Rate Last Dose    ibuprofen (MOTRIN) tablet 800 mg  800 mg Oral NOW Marcial Duverney, MD         Current Outpatient Prescriptions   Medication Sig Dispense Refill    doxycycline (VIBRA-TABS) 100 mg tablet Take 1 Tab by mouth two (2) times a day for 7 days. 14 Tab 0    metroNIDAZOLE (FLAGYL) 500 mg tablet Take 1 Tab by mouth two (2) times a day for 7 days.  14 Tab 0       Past History     Past Medical History:  Past Medical History:   Diagnosis Date    Abscess 12/1/2016    Anemia NEC     [facility] [address]     Breast tenderness     bilateral, since 11/2016, on and off     Depression     Dizziness     Gastrointestinal disorder     pancreatitis    Increased frequency of headaches     Kidney stones 10/2012    Nausea     Nipple discharge 2009    white/clear discharge since birth of child     Other ill-defined conditions(799.89)     sickle cell trait    Other ill-defined conditions(799.89)     anemia    Psychiatric disorder     depression, bi-polar    Psychotic disorder     bipolar 1    Sickle cell trait ([facility] [address]     Sickle cell trait ([facility] [address]     Stomach pain        Past Surgical History:  Past Surgical History:   Procedure Laterality Date    HX GYN      tubaligation    HX GYN      ectopic pregnancy surgery    HX OTHER SURGICAL      hx of blood transfusions       Family History:  Family History   Problem Relation Age of Onset    Heart Disease Father        Social History:  Social History   Substance Use Topics    Smoking status: Current Every Day Smoker     Packs/day: 0.25     Years: 9.00    Smokeless tobacco: Never Used    Alcohol use No       Allergies: Allergies   Allergen Reactions    Latex Itching    Zyrtec [Cetirizine] Vertigo         Review of Systems   Review of Systems   Constitutional: Negative. Negative for chills, fever and unexpected weight change. HENT: Negative. Negative for congestion and trouble swallowing. Eyes: Negative for discharge. Respiratory: Negative. Negative for cough, chest tightness and shortness of breath. Cardiovascular: Negative. Negative for chest pain. Gastrointestinal: Negative. Negative for abdominal distention, abdominal pain, constipation, diarrhea, nausea and vomiting. Endocrine: Negative. Genitourinary: Positive for hematuria and pelvic pain. Negative for difficulty urinating, dysuria, frequency, urgency, vaginal discharge and vaginal pain. Musculoskeletal: Negative. Negative for arthralgias and myalgias. Skin: Negative. Negative for color change. Allergic/Immunologic: Negative. Neurological: Negative.   Negative for dizziness, speech difficulty and headaches. Hematological: Negative. Psychiatric/Behavioral: Negative. Negative for agitation and confusion. All other systems reviewed and are negative. Physical Exam   Physical Exam   Constitutional: She is oriented to person, place, and time. She appears well-developed and well-nourished. No distress. Well appearing AA female in NAD. HENT:   Head: Normocephalic and atraumatic. Eyes: Conjunctivae and EOM are normal.   Neck: Neck supple. Cardiovascular: Normal rate, regular rhythm and intact distal pulses. Pulmonary/Chest: Effort normal. No respiratory distress. Abdominal: Soft. Bowel sounds are normal. She exhibits no distension. There is no tenderness. There is no rebound and no guarding. Abdomen is soft and non-tender. There is no tenderness to palpation or any suprapubic tenderness. Genitourinary:   Genitourinary Comments: PELVIC EXAM:  Cervical motion tenderness. White vaginal discharge was present. Musculoskeletal: Normal range of motion. She exhibits no deformity. Neurological: She is alert and oriented to person, place, and time. Skin: Skin is warm and dry. She is not diaphoretic. Psychiatric: She has a normal mood and affect. Her behavior is normal. Thought content normal.   Vitals reviewed.         Diagnostic Study Results     Labs -     Recent Results (from the past 12 hour(s))   URINALYSIS W/ REFLEX CULTURE    Collection Time: 12/03/17  7:03 PM   Result Value Ref Range    Color BLOODY      Appearance CLOUDY (A) CLEAR      Specific gravity 1.015 1.003 - 1.030      pH (UA) 7.5 5.0 - 8.0      Protein 30 (A) NEG mg/dL    Glucose NEGATIVE  NEG mg/dL    Ketone TRACE (A) NEG mg/dL    Bilirubin NEGATIVE  NEG      Blood LARGE (A) NEG      Urobilinogen 2.0 (H) 0.2 - 1.0 EU/dL    Nitrites NEGATIVE  NEG      Leukocyte Esterase SMALL (A) NEG      WBC 0-4 0 - 4 /hpf    RBC >100 (H) 0 - 5 /hpf    Epithelial cells FEW FEW /lpf    Bacteria 1+ (A) NEG /hpf    UA:UC IF INDICATED URINE CULTURE ORDERED (A) CNI     HCG URINE, QL. - POC    Collection Time: 12/03/17  7:04 PM   Result Value Ref Range    Pregnancy test,urine (POC) NEGATIVE  NEG     WET PREP    Collection Time: 12/03/17  7:47 PM   Result Value Ref Range    Clue cells CLUE CELLS PRESENT      Wet prep TRICHOMONAS PRESENT     KOH, OTHER SOURCES    Collection Time: 12/03/17  7:47 PM   Result Value Ref Range    Special Requests: NO SPECIAL REQUESTS      KOH NO YEAST SEEN         Medical Decision Making   I am the first provider for this patient. I reviewed the vital signs, available nursing notes, past medical history, past surgical history, family history and social history. Vital Signs-Reviewed the patient's vital signs. Patient Vitals for the past 12 hrs:   Temp Pulse Resp BP SpO2   12/03/17 1848 98.7 °F (37.1 °C) 85 14 126/63 100 %     Records Reviewed: Nursing Notes and Old Medical Records    Provider Notes (Medical Decision Making):   DDx: Hematuria, UTI, Pelvic pain, menstruation, vaginitis, cervicitis, STI. ED Course:   Initial assessment performed. The patients presenting problems have been discussed, and they are in agreement with the care plan formulated and outlined with them. I have encouraged them to ask questions as they arise throughout their visit. PROGRESS NOTE:  7:29 PM  I have noted that the triage note states the pt has c/o vaginal pain. I've had an extensive conversation with the pt concerning her vaginal symptoms and she is denying any vaginal discharge or dysuria. She does not think she has an STD or any lesions. Will defer pelvic exam until UA results. PROGRESS NOTE:  7:36 PM  UA resulted, pt does not appear to have a UTI, will do a pelvic exam.    Procedure Note - Pelvic Exam:    7:36 PM  Performed by: Shayy Walsh MD  Chaperoned by: Bandar Maza RN   Pelvic exam was performed using bimanual and speculum.  Further findings noted in physical exam.   The procedure took 1-15 minutes, and pt tolerated well. Disposition:  DISCHARGE NOTE  8:28 PM  The patient has been re-evaluated and is ready for discharge. Reviewed available results with patient. Counseled pt on diagnosis and care plan. Pt has expressed understanding, and all questions have been answered. Pt agrees with plan and agrees to F/U as recommended, or return to the ED if their sxs worsen. Discharge instructions have been provided and explained to the pt, along with reasons to return to the ED. PLAN:  1. Current Discharge Medication List      START taking these medications    Details   doxycycline (VIBRA-TABS) 100 mg tablet Take 1 Tab by mouth two (2) times a day for 7 days. Qty: 14 Tab, Refills: 0      metroNIDAZOLE (FLAGYL) 500 mg tablet Take 1 Tab by mouth two (2) times a day for 7 days. Qty: 14 Tab, Refills: 0           2. Follow-up Information     Follow up With Details Comments [address] Awilda Villalta NP Schedule an appointment as soon as possible for a visit  [address]  [phone]          Return to ED if worse     Diagnosis     Clinical Impression:   1. Cervicitis    2. BV (bacterial vaginosis)    3. Trichomonal infection    4. Hematuria, unspecified type        Attestations: This note is prepared by Bridget Junior acting as Scribe for MD Martha Du MD : The scribe's documentation has been prepared under my direction and personally reviewed by me in its entirety. I confirm that the note above accurately reflects all work, treatment, procedures, and medical decision making performed by me.

## 2017-12-04 NOTE — ED NOTES
Discharge instructions were given to the patient by Provider. The patient left the Emergency Department ambulatory, alert and oriented and in no acute distress with 2 prescriptions. The patient was encouraged to call or return to the ED for worsening issues or problems and was encouraged to schedule a follow up appointment for continuing care. The patient verbalized understanding of discharge instructions and prescriptions, all questions were answered. The patient has no further concerns at this time.

## 2017-12-04 NOTE — DISCHARGE INSTRUCTIONS
Cervicitis: Care Instructions  Your Care Instructions    Cervicitis means that your cervix is inflamed. The cervix is the part of your uterus that opens into your vagina. This problem is most often caused by an infection. Some women get it after they have a sexually transmitted infection (STI). These include gonorrhea and chlamydia. It can also be caused by irritation from some types of birth control. Two examples are the cervical cap or diaphragm. Your doctor may do some tests to help find the cause of the problem. It is very important to treat cervicitis. If you don't, you could have serious health problems. For this reason, you may need a test after your treatment to make sure the infection is gone. Follow-up care is a key part of your treatment and safety. Be sure to make and go to all appointments, and call your doctor if you are having problems. It's also a good idea to know your test results and keep a list of the medicines you take. How can you care for yourself at home? · Take your antibiotics as directed. Do not stop taking them just because you feel better. You need to take the full course of antibiotics. · If your doctor prescribed antifungal medicine, use it as directed. · While you are being treated, do not have sex. If your treatment is one dose of antibiotics, wait at least 7 days after you take your medicine before you have any kind of sexual contact. Even if you use a condom, you could get infected again. · It's important to tell your sex partner or partners that you have cervicitis. It may be related to an STI. Any partners should get tested and then treated if they have an STI. This is true even if they don't have symptoms. · Do not douche. It can change the normal balance of substances in your vagina. · Do not use tampons while you are being treated. To prevent STIs  · Use latex condoms every time you have sex. Use them from the start to the end of sexual contact.   · Talk to your partner before you have sex. Find out if he or she has or is at risk for any sexually transmitted infection (STI). Keep in mind that a person may be able to spread an STI even if he or she does not have symptoms. · Do not have sex with anyone who has symptoms of an STI. These include sores on the genitals or mouth. · Having one sex partner (who does not have STIs and does not have sex with anyone else) is a good way to avoid STIs. When should you call for help? Call your doctor now or seek immediate medical care if:  ? · You have new or worse pain in your belly or pelvis. ? · You have vaginal discharge that has increased in amount or smells bad.   ? · You have unusual vaginal bleeding. ? · You have a new or higher fever. ? Watch closely for changes in your health, and be sure to contact your doctor if:  ? · You do not get better as expected. Where can you learn more? Go to http://roula-susan.info/. Enter W586 in the search box to learn more about \"Cervicitis: Care Instructions. \"  Current as of: May 12, 2017  Content Version: 11.4  © 2167-1859 [employer]. Care instructions adapted under license by [employer] (which disclaims liability or warranty for this information). If you have questions about a medical condition or this instruction, always ask your healthcare professional. [address] any warranty or liability for your use of this information. Bacterial Vaginosis: Care Instructions  Your Care Instructions    Bacterial vaginosis is a type of vaginal infection. It is caused by excess growth of certain bacteria that are normally found in the vagina. Symptoms can include itching, swelling, pain when you urinate or have sex, and a gray or yellow discharge with a \"fishy\" odor. It is not considered an infection that is spread through sexual contact.   Although symptoms can be annoying and uncomfortable, bacterial vaginosis does not usually cause other health problems. However, if you have it while you are pregnant, it can cause complications. While the infection may go away on its own, most doctors use antibiotics to treat it. You may have been prescribed pills or vaginal cream. With treatment, bacterial vaginosis usually clears up in 5 to 7 days. Follow-up care is a key part of your treatment and safety. Be sure to make and go to all appointments, and call your doctor if you are having problems. It's also a good idea to know your test results and keep a list of the medicines you take. How can you care for yourself at home? · Take your antibiotics as directed. Do not stop taking them just because you feel better. You need to take the full course of antibiotics. · Do not eat or drink anything that contains alcohol if you are taking metronidazole (Flagyl). · Keep using your medicine if you start your period. Use pads instead of tampons while using a vaginal cream or suppository. Tampons can absorb the medicine. · Wear loose cotton clothing. Do not wear nylon and other materials that hold body heat and moisture close to the skin. · Do not scratch. Relieve itching with a cold pack or a cool bath. · Do not wash your vaginal area more than once a day. Use plain water or a mild, unscented soap. Do not douche. When should you call for help? Watch closely for changes in your health, and be sure to contact your doctor if:  ? · You have unexpected vaginal bleeding. ? · You have a fever. ? · You have new or increased pain in your vagina or pelvis. ? · You are not getting better after 1 week. ? · Your symptoms return after you finish the course of your medicine. Where can you learn more? Go to http://roula-susan.info/. Micki Borges in the search box to learn more about \"Bacterial Vaginosis: Care Instructions. \"  Current as of: October 13, 2016  Content Version: 11.4  © 0844-1866 Healthwise, [facility].  Care instructions adapted under license by [employer] (which disclaims liability or warranty for this information). If you have questions about a medical condition or this instruction, always ask your healthcare professional. [address] any warranty or liability for your use of this information. Trichomoniasis: Care Instructions  Your Care Instructions  Trichomoniasis is a sexually transmitted infection (STI) that is spread by having sex with an infected partner. Trichomoniasis is commonly called trich (say \"trick\"). In women, trich may cause vaginal itching and a smelly discharge. But in many cases, especially in men, there are no symptoms. Kerrie Cullen is treated so that you do not spread it to others. Both you and your sex partner or partners should be treated at the same time so you do not infect each other again. Trich may cause problems with pregnancy. Your doctor will talk with you about treatment for Trich if you are pregnant. Follow-up care is a key part of your treatment and safety. Be sure to make and go to all appointments, and call your doctor if you are having problems. It's also a good idea to know your test results and keep a list of the medicines you take. How can you care for yourself at home? · Take your antibiotics as directed. Do not stop taking them just because you feel better. You need to take the full course of antibiotics. · Do not have sex while you are being treated. If your doctor gave you a single dose of antibiotics, do not have sex for one week after being treated and until your partner also has been treated. · Tell your sex partner (or partners) that he or she will also need to be tested and treated. · Use a cold water compress or cool baths to relieve itching. To prevent trichomoniasis in the future  · Use latex condoms every time you have sex. Use them from the beginning to the end of sexual contact. · Talk to your partner before having sex. Find out if he or she has or is at risk for trich or any other STI. Keep in mind that a person may be able to spread an STI even if he or she does not have symptoms. · Do not have sex if you are being treated for trich or any other STI. · Do not have sex with anyone who has symptoms of an STI, such as sores on the genitals or mouth. · Having one sex partner (who does not have STIs and does not have sex with anyone else) is a good way to avoid STIs. When should you call for help? Call your doctor now or seek immediate medical care if:  ? · You have unusual vaginal bleeding. ? · You have a fever. ? · You have new discharge from the vagina or penis. ? · You have pelvic pain. ? Watch closely for changes in your health, and be sure to contact your doctor if:  ? · You do not get better as expected. ? · You have any new symptoms or your symptoms get worse. Where can you learn more? Go to http://roula-susan.info/. Enter O756 in the search box to learn more about \"Trichomoniasis: Care Instructions. \"  Current as of: March 20, 2017  Content Version: 11.4  © 6598-2747 [employer]. Care instructions adapted under license by [employer] (which disclaims liability or warranty for this information). If you have questions about a medical condition or this instruction, always ask your healthcare professional. [place] [address] any warranty or liability for your use of this information. Blood in the Urine: Care Instructions  Your Care Instructions    Blood in the urine, or hematuria, may make the urine look red, brown, or pink. There may be blood every time you urinate or just from time to time. You cannot always see blood in the urine, but it will show up in a urine test.  Blood in the urine may be serious. It should always be checked by a doctor.  Your doctor may recommend more tests, including an X-ray, a CT scan, or a cystoscopy (which lets a doctor look inside the urethra and bladder). Blood in the urine can be a sign of another problem. Common causes are bladder infections and kidney stones. An injury to your groin or your genital area can also cause bleeding in the urinary tract. Very hard exercise-such as running a marathon-can cause blood in the urine. Blood in the urine can also be a sign of kidney disease or cancer in the bladder or kidney. Many cases of blood in the urine are caused by a harmless condition that runs in families. This is called benign familial hematuria. It does not need any treatment. Sometimes your urine may look red or brown even though it does not contain blood. For example, not getting enough fluids (dehydration), taking certain medicines, or having a liver problem can change the color of your urine. Eating foods such as beets, rhubarb, or blackberries or foods with red food coloring can make your urine look red or pink. Follow-up care is a key part of your treatment and safety. Be sure to make and go to all appointments, and call your doctor if you are having problems. It's also a good idea to know your test results and keep a list of the medicines you take. When should you call for help? Call your doctor now or seek immediate medical care if:  · You have symptoms of a urinary infection. For example:  ¨ You have pus in your urine. ¨ You have pain in your back just below your rib cage. This is called flank pain. ¨ You have a fever, chills, or body aches. ¨ It hurts to urinate. ¨ You have groin or belly pain. · You have more blood in your urine. Watch closely for changes in your health, and be sure to contact your doctor if:  · You have new urination problems. · You do not get better as expected. Where can you learn more? Go to http://roula-susan.info/. Enter P692 in the search box to learn more about \"Blood in the Urine: Care Instructions. \"  Current as of:  May 12, 2017  Content Version: 11.4  © 9135-8992 [employer]. Care instructions adapted under license by [employer] (which disclaims liability or warranty for this information). If you have questions about a medical condition or this instruction, always ask your healthcare professional. [address] any warranty or liability for your use of this information.

## 2017-12-04 NOTE — ED NOTES
Pt refused ibuprofen stating \"I don't want to take all that medicine. \" Pt informed that there are no serious adverse effects with the other medications she received. Pt given ginger ale and cookies as a snack. MD made aware.

## 2017-12-05 LAB
BACTERIA SPEC CULT: NORMAL
CC UR VC: NORMAL
SERVICE CMNT-IMP: NORMAL

## 2017-12-13 ENCOUNTER — OFFICE VISIT (OUTPATIENT)
Dept: INTERNAL MEDICINE CLINIC | Age: 32
End: 2017-12-13

## 2017-12-13 VITALS
OXYGEN SATURATION: 99 % | RESPIRATION RATE: 20 BRPM | HEIGHT: 67 IN | BODY MASS INDEX: 22.95 KG/M2 | DIASTOLIC BLOOD PRESSURE: 52 MMHG | SYSTOLIC BLOOD PRESSURE: 97 MMHG | WEIGHT: 146.2 LBS | HEART RATE: 69 BPM | TEMPERATURE: 97.4 F

## 2017-12-13 DIAGNOSIS — R31.0 GROSS HEMATURIA: Primary | ICD-10-CM

## 2017-12-13 DIAGNOSIS — R10.9 LEFT FLANK PAIN: ICD-10-CM

## 2017-12-13 DIAGNOSIS — Z86.59 H/O SCHIZOPHRENIA: ICD-10-CM

## 2017-12-13 DIAGNOSIS — Z87.42: ICD-10-CM

## 2017-12-13 DIAGNOSIS — R10.2 VAGINAL PAIN: ICD-10-CM

## 2017-12-13 DIAGNOSIS — F31.70 HISTORY OF DEPRESSED BIPOLAR DISORDER (HCC): ICD-10-CM

## 2017-12-13 DIAGNOSIS — Z86.19 H/O TRICHOMONAL VAGINITIS: ICD-10-CM

## 2017-12-13 LAB
BILIRUB UR QL STRIP: NORMAL
GLUCOSE UR-MCNC: NEGATIVE MG/DL
KETONES P FAST UR STRIP-MCNC: NORMAL MG/DL
PH UR STRIP: 5.5 [PH] (ref 4.6–8)
PROT UR QL STRIP: NORMAL
SP GR UR STRIP: 1.02 (ref 1–1.03)
UA UROBILINOGEN AMB POC: NORMAL (ref 0.2–1)
URINALYSIS CLARITY POC: NORMAL
URINALYSIS COLOR POC: NORMAL
URINE BLOOD POC: NORMAL
URINE LEUKOCYTES POC: NEGATIVE
URINE NITRITES POC: NEGATIVE

## 2017-12-13 RX ORDER — IBUPROFEN 800 MG/1
800 TABLET ORAL
Qty: 20 TAB | Refills: 0 | Status: SHIPPED | OUTPATIENT
Start: 2017-12-13 | End: 2017-12-15

## 2017-12-13 NOTE — PATIENT INSTRUCTIONS
Cervicitis: Care Instructions  Your Care Instructions    Cervicitis means that your cervix is inflamed. The cervix is the part of your uterus that opens into your vagina. This problem is most often caused by an infection. Some women get it after they have a sexually transmitted infection (STI). These include gonorrhea and chlamydia. It can also be caused by irritation from some types of birth control. Two examples are the cervical cap or diaphragm. Your doctor may do some tests to help find the cause of the problem. It is very important to treat cervicitis. If you don't, you could have serious health problems. For this reason, you may need a test after your treatment to make sure the infection is gone. Follow-up care is a key part of your treatment and safety. Be sure to make and go to all appointments, and call your doctor if you are having problems. It's also a good idea to know your test results and keep a list of the medicines you take. How can you care for yourself at home? · Take your antibiotics as directed. Do not stop taking them just because you feel better. You need to take the full course of antibiotics. · If your doctor prescribed antifungal medicine, use it as directed. · While you are being treated, do not have sex. If your treatment is one dose of antibiotics, wait at least 7 days after you take your medicine before you have any kind of sexual contact. Even if you use a condom, you could get infected again. · It's important to tell your sex partner or partners that you have cervicitis. It may be related to an STI. Any partners should get tested and then treated if they have an STI. This is true even if they don't have symptoms. · Do not douche. It can change the normal balance of substances in your vagina. · Do not use tampons while you are being treated. To prevent STIs  · Use latex condoms every time you have sex. Use them from the start to the end of sexual contact.   · Talk to your partner before you have sex. Find out if he or she has or is at risk for any sexually transmitted infection (STI). Keep in mind that a person may be able to spread an STI even if he or she does not have symptoms. · Do not have sex with anyone who has symptoms of an STI. These include sores on the genitals or mouth. · Having one sex partner (who does not have STIs and does not have sex with anyone else) is a good way to avoid STIs. When should you call for help? Call your doctor now or seek immediate medical care if:  ? · You have new or worse pain in your belly or pelvis. ? · You have vaginal discharge that has increased in amount or smells bad.   ? · You have unusual vaginal bleeding. ? · You have a new or higher fever. ? Watch closely for changes in your health, and be sure to contact your doctor if:  ? · You do not get better as expected. Where can you learn more? Go to http://roula-susan.info/. Enter U029 in the search box to learn more about \"Cervicitis: Care Instructions. \"  Current as of: May 12, 2017  Content Version: 11.4  © 8326-7616 [employer]. Care instructions adapted under license by [employer] (which disclaims liability or warranty for this information). If you have questions about a medical condition or this instruction, always ask your healthcare professional. [address] any warranty or liability for your use of this information. Cystoscopy: Care Instructions  Your Care Instructions  Cystoscopy is a test. It uses a thin, lighted tube called a cystoscope to see the inside of the bladder and the urethra. The urethra is the tube that carries urine out of the body. This test is helpful because it lets your doctor see areas of your bladder and urethra that are hard to see on X-rays. It can help your doctor find bladder stones, tumors, bleeding, and infection.  During this test, your doctor also can take tissue and urine samples. And if your doctor finds small stones or growths, he or she can remove them. In most cases the scope is in the bladder for less than 10 minutes. But the entire test may take 45 minutes or longer. You will probably get local anesthesia. This numbs a small part of your body. Or you may get spinal anesthesia, which numbs more of your body. Once in a while, doctors use general anesthesia. It makes you sleep during surgery. If you get a local anesthetic, you may be able to get up right after the test. But if you had spinal or general anesthesia, you will stay in the recovery room until you are able to walk or you have feeling again in your lower body. This usually takes about an hour. Your doctor may be able to tell you some of the results right after the test. But the complete results may take several days. Follow-up care is a key part of your treatment and safety. Be sure to make and go to all appointments, and call your doctor if you are having problems. It's also a good idea to know your test results and keep a list of the medicines you take. How can you care for yourself at home? Before the test  · If you are having a local anesthetic, you can eat and drink before the test.  · If you are having a spinal or general anesthetic, do not eat or drink anything for at least 8 hours before the test. Tell your doctor what medicines you take. · If you are not staying overnight in the hospital, make sure you have someone who can drive you home after the test.  After the test  · If your doctor prescribed antibiotics, take them as directed. Do not stop taking them just because you feel better. You need to take the full course of antibiotics. · You may have some burning when you urinate for a day or two after the test. You may feel better if you drink more fluids. This may also help prevent an infection.   · Your urine may have a pinkish color for a few days after the test.  When should you call for help?  Call your doctor now or seek immediate medical care if:  ? · Your urine is still red or you see blood clots after you have urinated several times. ? · You cannot pass urine 8 hours after the test.   ? · You get a fever or chills. ? · You have pain in your belly or your back just below your rib cage. This is also called flank pain. ? Watch closely for changes in your health, and be sure to contact your doctor if:  ? · You have pain or burning when you urinate. A burning sensation is normal for a day or two after the test. But call if it does not get better. ? · You have a frequent urge to urinate but can pass only small amounts of urine. ? · Your urine is pink, red, or cloudy or smells bad. It is normal for the urine to have a pinkish color for a few days after the test. But call if it does not get better. ? · You do not get better as expected. Where can you learn more? Go to http://roula-susan.info/. Enter K293 in the search box to learn more about \"Cystoscopy: Care Instructions. \"  Current as of: May 12, 2017  Content Version: 11.4  © 7034-3507 [employer]. Care instructions adapted under license by [employer] (which disclaims liability or warranty for this information). If you have questions about a medical condition or this instruction, always ask your healthcare professional. [place] [address] any warranty or liability for your use of this information. Learning About Diet for Kidney Stone Prevention  What are kidney stones? Kidney stones are made of salts and minerals in the urine that form small \"rocky. \" Stones can form in the kidneys and the ureters (the tubes that lead from the kidneys to the bladder). They can also form in the bladder. Stones may not cause a problem as long as they stay in the kidneys. But they can cause sudden, severe pain.  Pain is most likely when the stones travel from the kidneys to the bladder. Kidney stones can cause bloody urine. Kidney stones often run in families. You are more likely to get them if you don't drink enough fluids, mainly water. Certain foods and drinks and some dietary supplements may also increase your risk for kidney stones if you consume too much of them. What can you do to prevent kidney stones? Changing what you eat may not prevent all types of kidney stones. But for people who have a history of certain kinds of kidney stones, some changes in diet may help. A dietitian can help you set up a meal plan that includes healthy, low-oxalate choices. Here are some general guidelines to get you started. Plan your meals and snacks around foods that are low in oxalate. These foods include:  · Corn, kale, parsnips, and squash,. · Beef, chicken, pork, turkey, and fish. · Milk, butter, cheese, and yogurt. You can eat certain foods that are medium-high in oxalate, but eat them only once in a while. These foods include:  · Bread. · Brown rice. · English muffins. · Figs. · Popcorn. · String beans. · Tomatoes. Limit very high-oxalate foods, including:  · Black tea. · Coffee. · Chocolate. · Dark green vegetables. · Nuts. Here are some other things you can do to help prevent kidney stones. · Drink plenty of fluids. If you have kidney, heart, or liver disease and have to limit fluids, talk with your doctor before you increase the amount of fluids you drink. · Do not take more than the recommended daily dose of vitamins C and D.  · Limit the salt in your diet. · Eat a balanced diet that is not too high in protein. Follow-up care is a key part of your treatment and safety. Be sure to make and go to all appointments, and call your doctor if you are having problems. It's also a good idea to know your test results and keep a list of the medicines you take. Where can you learn more? Go to http://roula-susan.info/.   Enter C138 in the search box to learn more about \"Learning About Diet for Kidney Stone Prevention. \"  Current as of: May 12, 2017  Content Version: 11.4  © 5849-4193 [employer]. Care instructions adapted under license by [employer] (which disclaims liability or warranty for this information). If you have questions about a medical condition or this instruction, always ask your healthcare professional. [address] any warranty or liability for your use of this information.

## 2017-12-13 NOTE — MR AVS SNAPSHOT
Visit Information Date & Time Provider Department Dept. Phone Encounter #  
 12/13/2017  1:20 PM Gunnar Benites NP [address] [facility] [phone] [id number] Follow-up Instructions Return in about 3 months (around 3/13/2018) for hematuria. Upcoming Health Maintenance Date Due Influenza Age 5 to Adult 8/1/2017 PAP AKA CERVICAL CYTOLOGY 9/1/2019 DTaP/Tdap/Td series (2 - Td) 11/23/2026 Allergies as of 12/13/2017  Review Complete On: 12/3/2017 By: Robbie Ashby RN Severity Noted Reaction Type Reactions Latex  05/12/2011   Topical Itching Naproxen  12/13/2017   Intolerance Vertigo Zyrtec [Cetirizine]  05/20/2016    Vertigo Current Immunizations  Reviewed on 9/30/2015 Name Date Influenza Vaccine 10/1/2012 Influenza Vaccine Intradermal PF 10/29/2015  2:00 PM  
 Influenza Vaccine Whole 9/1/2012 TB Skin Test (PPD) 10/1/2012 ZZZ-RETIRED (DO NOT USE) Pneumococcal Vaccine (Unspecified Type) 10/1/2009 Not reviewed this visit You Were Diagnosed With   
  
 Codes Comments Gross hematuria    -  Primary ICD-10-CM: R31.0 ICD-9-CM: 599.71 Vaginal pain     ICD-10-CM: R10.2 ICD-9-CM: 625.9 H/O cervicitis     ICD-10-CM: Z87.42 
ICD-9-CM: V13.29   
 H/O trichomonal vaginitis     ICD-10-CM: Z86.19 ICD-9-CM: V13.29   
 H/O schizophrenia     ICD-10-CM: Z86.59 
ICD-9-CM: V11.0 History of depressed bipolar disorder ([facility] [address]     ICD-10-CM: F31.70 ICD-9-CM: 296.56 Vitals BP Pulse Temp Resp Height(growth percentile) Weight(growth percentile) 97/52 (BP 1 Location: Left arm, BP Patient Position: Sitting) 69 97.4 °F (36.3 °C) (Oral) 20 5' 7\" (1.702 m) 146 lb 3.2 oz (66.3 kg) LMP SpO2 BMI OB Status Smoking Status 11/09/2017 99% 22.9 kg/m2 Having regular periods Current Every Day Smoker Vitals History BMI and BSA Data  Body Mass Index Body Surface Area  
 22.9 kg/m 2 1.77 m 2  
  
  
 Preferred Pharmacy Pharmacy Name Phone [facility] [address] [phone] Your Updated Medication List  
  
   
This list is accurate as of: 12/13/17  2:02 PM.  Always use your most recent med list.  
  
  
  
  
 ibuprofen 800 mg tablet Commonly known as:  MOTRIN Take 1 Tab by mouth every eight (8) hours as needed for Pain. Prescriptions Sent to Pharmacy Refills  
 ibuprofen (MOTRIN) 800 mg tablet 0 Sig: Take 1 Tab by mouth every eight (8) hours as needed for Pain. Class: Normal  
 Pharmacy: [facility], [address] [place] [address] #: [phone] Route: Oral  
  
We Performed the Following AMB POC URINALYSIS DIP STICK AUTO W/ MICRO [39063 CPT(R)] REFERRAL TO FEMALE PELVIC MEDICINE AND RECONSTRUCTIVE SURGERY [QUZ427 Custom] Comments:  
 Gross hematuria and vaginal pain. REFERRAL TO PSYCHIATRY [REF91 Custom] Follow-up Instructions Return in about 3 months (around 3/13/2018) for hematuria. Referral Information Referral ID Referred By Referred To  
  
 [id number] Juan Orosco [facility] Visits Status Start Date End Date 1 New Request 12/13/17 12/13/18 If your referral has a status of pending review or denied, additional information will be sent to support the outcome of this decision. Referral ID Referred By Referred To  
 [id number] Dayna Callejas NP  
   [address] [facility] [address] [place], [address]  Phone: [phone] Fax: [phone] Visits Status Start Date End Date 1 New Request 12/13/17 12/13/18 If your referral has a status of pending review or denied, additional information will be sent to support the outcome of this decision. Patient Instructions Cervicitis: Care Instructions Your Care Instructions Cervicitis means that your cervix is inflamed. The cervix is the part of your uterus that opens into your vagina. This problem is most often caused by an infection. Some women get it after they have a sexually transmitted infection (STI). These include gonorrhea and chlamydia. It can also be caused by irritation from some types of birth control. Two examples are the cervical cap or diaphragm. Your doctor may do some tests to help find the cause of the problem. It is very important to treat cervicitis. If you don't, you could have serious health problems. For this reason, you may need a test after your treatment to make sure the infection is gone. Follow-up care is a key part of your treatment and safety. Be sure to make and go to all appointments, and call your doctor if you are having problems. It's also a good idea to know your test results and keep a list of the medicines you take. How can you care for yourself at home? · Take your antibiotics as directed. Do not stop taking them just because you feel better. You need to take the full course of antibiotics. · If your doctor prescribed antifungal medicine, use it as directed. · While you are being treated, do not have sex. If your treatment is one dose of antibiotics, wait at least 7 days after you take your medicine before you have any kind of sexual contact. Even if you use a condom, you could get infected again. · It's important to tell your sex partner or partners that you have cervicitis. It may be related to an STI. Any partners should get tested and then treated if they have an STI. This is true even if they don't have symptoms. · Do not douche. It can change the normal balance of substances in your vagina. · Do not use tampons while you are being treated. To prevent STIs · Use latex condoms every time you have sex. Use them from the start to the end of sexual contact. · Talk to your partner before you have sex.  Find out if he or she has or is at risk for any sexually transmitted infection (STI). Keep in mind that a person may be able to spread an STI even if he or she does not have symptoms. · Do not have sex with anyone who has symptoms of an STI. These include sores on the genitals or mouth. · Having one sex partner (who does not have STIs and does not have sex with anyone else) is a good way to avoid STIs. When should you call for help? Call your doctor now or seek immediate medical care if: 
? · You have new or worse pain in your belly or pelvis. ? · You have vaginal discharge that has increased in amount or smells bad.  
? · You have unusual vaginal bleeding. ? · You have a new or higher fever. ? Watch closely for changes in your health, and be sure to contact your doctor if: 
? · You do not get better as expected. Where can you learn more? Go to http://roula-susan.info/. Enter C200 in the search box to learn more about \"Cervicitis: Care Instructions. \" Current as of: May 12, 2017 Content Version: 11.4 © 7801-0079 fotopedia. Care instructions adapted under license by [employer] (which disclaims liability or warranty for this information). If you have questions about a medical condition or this instruction, always ask your healthcare professional. [address] any warranty or liability for your use of this information. Cystoscopy: Care Instructions Your Care Instructions Cystoscopy is a test. It uses a thin, lighted tube called a cystoscope to see the inside of the bladder and the urethra. The urethra is the tube that carries urine out of the body. This test is helpful because it lets your doctor see areas of your bladder and urethra that are hard to see on X-rays. It can help your doctor find bladder stones, tumors, bleeding, and infection. During this test, your doctor also can take tissue and urine samples.  And if your doctor finds small stones or growths, he or she can remove them. In most cases the scope is in the bladder for less than 10 minutes. But the entire test may take 45 minutes or longer. You will probably get local anesthesia. This numbs a small part of your body. Or you may get spinal anesthesia, which numbs more of your body. Once in a while, doctors use general anesthesia. It makes you sleep during surgery. If you get a local anesthetic, you may be able to get up right after the test. But if you had spinal or general anesthesia, you will stay in the recovery room until you are able to walk or you have feeling again in your lower body. This usually takes about an hour. Your doctor may be able to tell you some of the results right after the test. But the complete results may take several days. Follow-up care is a key part of your treatment and safety. Be sure to make and go to all appointments, and call your doctor if you are having problems. It's also a good idea to know your test results and keep a list of the medicines you take. How can you care for yourself at home? Before the test 
· If you are having a local anesthetic, you can eat and drink before the test. 
· If you are having a spinal or general anesthetic, do not eat or drink anything for at least 8 hours before the test. Tell your doctor what medicines you take. · If you are not staying overnight in the hospital, make sure you have someone who can drive you home after the test. 
After the test 
· If your doctor prescribed antibiotics, take them as directed. Do not stop taking them just because you feel better. You need to take the full course of antibiotics. · You may have some burning when you urinate for a day or two after the test. You may feel better if you drink more fluids. This may also help prevent an infection. · Your urine may have a pinkish color for a few days after the test. 
When should you call for help? Call your doctor now or seek immediate medical care if: 
? · Your urine is still red or you see blood clots after you have urinated several times. ? · You cannot pass urine 8 hours after the test.  
? · You get a fever or chills. ? · You have pain in your belly or your back just below your rib cage. This is also called flank pain. ? Watch closely for changes in your health, and be sure to contact your doctor if: 
? · You have pain or burning when you urinate. A burning sensation is normal for a day or two after the test. But call if it does not get better. ? · You have a frequent urge to urinate but can pass only small amounts of urine. ? · Your urine is pink, red, or cloudy or smells bad. It is normal for the urine to have a pinkish color for a few days after the test. But call if it does not get better. ? · You do not get better as expected. Where can you learn more? Go to http://roula-susan.info/. Enter H251 in the search box to learn more about \"Cystoscopy: Care Instructions. \" Current as of: May 12, 2017 Content Version: 11.4 © 0332-3062 "[employer]. Care instructions adapted under license by [employer] (which disclaims liability or warranty for this information). If you have questions about a medical condition or this instruction, always ask your healthcare professional. [place] [address] any warranty or liability for your use of this information. Learning About Diet for Kidney Stone Prevention What are kidney stones? Kidney stones are made of salts and minerals in the urine that form small \"rocky. \" Stones can form in the kidneys and the ureters (the tubes that lead from the kidneys to the bladder). They can also form in the bladder. Stones may not cause a problem as long as they stay in the kidneys. But they can cause sudden, severe pain.  Pain is most likely when the stones travel from the kidneys to the bladder. Kidney stones can cause bloody urine. Kidney stones often run in families. You are more likely to get them if you don't drink enough fluids, mainly water. Certain foods and drinks and some dietary supplements may also increase your risk for kidney stones if you consume too much of them. What can you do to prevent kidney stones? Changing what you eat may not prevent all types of kidney stones. But for people who have a history of certain kinds of kidney stones, some changes in diet may help. A dietitian can help you set up a meal plan that includes healthy, low-oxalate choices. Here are some general guidelines to get you started. Plan your meals and snacks around foods that are low in oxalate. These foods include: · Corn, kale, parsnips, and squash,. · Beef, chicken, pork, turkey, and fish. · Milk, butter, cheese, and yogurt. You can eat certain foods that are medium-high in oxalate, but eat them only once in a while. These foods include: · Bread. · Brown rice. · English muffins. · Figs. · Popcorn. · String beans. · Tomatoes. Limit very high-oxalate foods, including: · Black tea. · Coffee. · Chocolate. · Dark green vegetables. · Nuts. Here are some other things you can do to help prevent kidney stones. · Drink plenty of fluids. If you have kidney, heart, or liver disease and have to limit fluids, talk with your doctor before you increase the amount of fluids you drink. · Do not take more than the recommended daily dose of vitamins C and D. 
· Limit the salt in your diet. · Eat a balanced diet that is not too high in protein. Follow-up care is a key part of your treatment and safety. Be sure to make and go to all appointments, and call your doctor if you are having problems. It's also a good idea to know your test results and keep a list of the medicines you take. Where can you learn more? Go to http://roula-susan.info/. Enter C138 in the search box to learn more about \"Learning About Diet for Kidney Stone Prevention. \" Current as of: May 12, 2017 Content Version: 11.4 © 6781-7494 Healthwise[employer] Inc[employer]. Care instructions adapted under license by [employer] (which disclaims liability or warranty for this information). If you have questions about a medical condition or this instruction, always ask your healthcare professional. [address] any warranty or liability for your use of this information. Introducing [facility]! Noelle Angel introduces Robot App Store patient portal. Now you can access parts of your medical record, email your doctor's office, and request medication refills online. 1. In your internet browser, go to [email or web address] 2. Click on the First Time User? Click Here link in the Sign In box. You will see the New Member Sign Up page. 3. Enter your Robot App Store Access Code exactly as it appears below. You will not need to use this code after youve completed the sign-up process. If you do not sign up before the expiration date, you must request a new code. · Robot App Store Access Code: [email or web address] Expires: 3/3/2018  8:27 PM 
 
4. Enter the last four digits of your Social Security Number (xxxx) and Date of Birth (mm/dd/yyyy) as indicated and click Submit. You will be taken to the next sign-up page. 5. Create a Robot App Store ID. This will be your Robot App Store login ID and cannot be changed, so think of one that is secure and easy to remember. 6. Create a Robot App Store password. You can change your password at any time. 7. Enter your Password Reset Question and Answer. This can be used at a later time if you forget your password. 8. Enter your e-mail address. You will receive e-mail notification when new information is available in [address] 9. Click Sign Up. You can now view and download portions of your medical record. 10. Click the Download Summary menu link to download a portable copy of your medical information. If you have questions, please visit the Frequently Asked Questions section of the CogMetal website. Remember, CogMetal is NOT to be used for urgent needs. For medical emergencies, dial 911. Now available from your iPhone and Android! Please provide this summary of care documentation to your next provider. Your primary care clinician is listed as BOB Vasquez. If you have any questions after today's visit, please call [phone].

## 2017-12-13 NOTE — PROGRESS NOTES
Felix Edwards is a 34 y.o. female and presents with Hospital Follow Up [facility] ED)    Subjective:  Pt is here for ER follow-up on 12/3 for vaginal pain and blood in urine. Diagnosed with UTI, cervicitis, trichmonas and BV. Was given rocephin inj, doxy, azithromycin, pelvic exam, and Flagyl. Finished both a few days ago. Instructed to f/u with PCP/specialty. Reports feeling slightly BETTER THAN  when in ER. Continues to have blood in urine and pain in lower abdomen and left side. Seen by urology in past with imaging to kidneys when passing clots and no cause found. Denies recent trauma or sexual intercourse as cause. Pain Scale: 7/10. Review of Systems  Constitutional: negative for fevers, chills, anorexia and weight loss  Respiratory:  negative for cough, hemoptysis, dyspnea, and wheezing  CV:   negative for chest pain, palpitations, and lower extremity edema  GI:   negative for nausea, vomiting, diarrhea, abdominal pain, and melena  Endo:               negative for polyuria,polydipsia,polyphagia, and heat intolerance  Genitourinary: negative for frequency, urgency, dysuria, retention, and hematuria  Integument:  negative for rash, ulcerations, and pruritus  Hematologic:  negative for easy bruising and bleeding  Musculoskel: negative for arthralgias, muscle weakness,and joint pain/swelling  Neurological:  negative for headaches, dizziness, vertigo,and memory/gait problems  Behavl/Psych: + schizophrenia, bipolar, depression.  negative for feelings of anxiety and suicide    Past Medical History:   Diagnosis Date    Abscess 12/1/2016    Anemia NEC     Bipolar 1 disorder ([facility] [address]     Breast tenderness     bilateral, since 11/2016, on and off     Depression     Dizziness     Gastrointestinal disorder     pancreatitis    Increased frequency of headaches     Kidney stones 10/2012    Nausea     Nipple discharge 2009    white/clear discharge since birth of child     Other ill-defined conditions(799.89) sickle cell trait    Other ill-defined conditions(799.89)     anemia    Psychiatric disorder     depression, bi-polar    Psychotic disorder     bipolar 1    Sickle cell trait (HCC)     Sickle cell trait ([facility] [address]     Stomach pain      Past Surgical History:   Procedure Laterality Date    HX GYN      tubaligation    HX GYN      ectopic pregnancy surgery    HX OTHER SURGICAL      hx of blood transfusions     Social History     Social History    Marital status: SINGLE     Spouse name: N/A    Number of children: N/A    Years of education: N/A     Social History Main Topics    Smoking status: Current Every Day Smoker     Packs/day: 0.25     Years: 9.00    Smokeless tobacco: Never Used    Alcohol use No    Drug use: No      Comment: occasional    Sexual activity: Not Currently     Partners: Male     Birth control/ protection: None     Other Topics Concern    None     Social History Narrative     Family History   Problem Relation Age of Onset    Heart Disease Father      Current Outpatient Prescriptions   Medication Sig Dispense Refill    ibuprofen (MOTRIN) 800 mg tablet Take 1 Tab by mouth every eight (8) hours as needed for Pain. 20 Tab 0     Allergies   Allergen Reactions    Latex Itching    Naproxen Vertigo    Zyrtec [Cetirizine] Vertigo       Objective:  Visit Vitals    BP 97/52 (BP 1 Location: Left arm, BP Patient Position: Sitting)    Pulse 69    Temp 97.4 °F (36.3 °C) (Oral)    Resp 20    Ht 5' 7\" (1.702 m)    Wt 146 lb 3.2 oz (66.3 kg)    LMP 11/09/2017    SpO2 99%    BMI 22.9 kg/m2     Wt Readings from Last 3 Encounters:   12/13/17 146 lb 3.2 oz (66.3 kg)   12/03/17 145 lb (65.8 kg)   03/21/17 146 lb 6.4 oz (66.4 kg)     Physical Exam:   General appearance - alert, well appearing, and in no distress. Mental status - A/O x 4, normal mood and affect. Neck -Supple ,normal CSP. FROM, non-tender. No significant adenopathy/thyromegaly. No JVD. Chest - CTA. Symmetric chest rise.  No wheezing, rales or rhonchi. Heart - Normal rate, regular rhythm. Normal S1, S2. No MGR or clicks. Abdomen - Soft,non-distended. Normoactive BS in all quadrants. Suprapubic TTP, no mass or HSM. Mild left CVAT. Ext- Radial, DP pulses, 2+ bilaterally. No pedal edema, clubbing, or cyanosis. Skin-Warm and dry. No hyperpigmentation, ulcerations, or suspicious lesions. Neuro - Normal speech, no focal findings or movement disorder. Normal strength, gait, and muscle tone. Assessment/Plan:  UA + large blood. NSAIDs. ABD/PELV CT ordered to r/o renal colic otherwise. Referred to UROGYN. Medication Side Effects and Warnings were discussed with patient: yes   Patient Labs were reviewed: yes  Patient Past Records were reviewed: yes    See below for other orders   Follow-up Disposition:  Return in about 3 months (around 3/13/2018) for hematuria. ICD-10-CM ICD-9-CM    1. Gross hematuria R31.0 599.71 REFERRAL TO FEMALE PELVIC MEDICINE AND RECONSTRUCTIVE SURGERY      AMB POC URINALYSIS DIP STICK AUTO W/ MICRO      ibuprofen (MOTRIN) 800 mg tablet      CT ABD PELV WO CONT   2. Vaginal pain R10.2 625.9 REFERRAL TO FEMALE PELVIC MEDICINE AND RECONSTRUCTIVE SURGERY      AMB POC URINALYSIS DIP STICK AUTO W/ MICRO      ibuprofen (MOTRIN) 800 mg tablet   3. H/O cervicitis Z87.42 V13.29 REFERRAL TO FEMALE PELVIC MEDICINE AND RECONSTRUCTIVE SURGERY      ibuprofen (MOTRIN) 800 mg tablet   4. H/O trichomonal vaginitis Z86.19 V13.29 REFERRAL TO FEMALE PELVIC MEDICINE AND RECONSTRUCTIVE SURGERY      ibuprofen (MOTRIN) 800 mg tablet   5. H/O schizophrenia Z86.59 V11.0 REFERRAL TO PSYCHIATRY   6. History of depressed bipolar disorder ([facility] [address] F31.70 296.56 REFERRAL TO PSYCHIATRY   7.  Left flank pain R10.9 789.09 CT ABD PELV WO CONT     Orders Placed This Encounter    CT ABD PELV WO CONT     Standing Status:   Future     Standing Expiration Date:   12/14/2018     Order Specific Question:   Is Patient Allergic to Contrast Dye? Answer:   No     Order Specific Question:   Is Patient Pregnant? Answer:   No     Order Specific Question:   Type of contrast.  PLEASE NOTE: IV contrast is NOT utilized with this order. Answer:   None    REFERRAL TO FEMALE PELVIC MEDICINE AND RECONSTRUCTIVE SURGERY     Referral Priority:   Routine     Referral Type:   Consultation     Referral Reason:   Specialty Services Required     Referral Location:   [facility]     Referred to Provider:   Yimi Means MD    REFERRAL TO PSYCHIATRY     Referral Priority:   Routine     Referral Type:   Behavioral Health     Referral Reason:   Specialty Services Required     Referred to Provider:   Sueellen Kocher, NP    AMB POC URINALYSIS DIP STICK AUTO W/ MICRO    ibuprofen (MOTRIN) 800 mg tablet     Sig: Take 1 Tab by mouth every eight (8) hours as needed for Pain. Dispense:  20 Tab     Refill:  0       Cecilio Wei expressed understanding of plan. An After Visit Summary was offered/printed and given to the patient.

## 2017-12-13 NOTE — PROGRESS NOTES
1. Have you been to the ER, urgent care clinic since your last visit? Hospitalized since your last visit? Yes When: 12/3/17 [facility] ED Cervicitis    2. Have you seen or consulted any other health care providers outside of the [address] [place] [address] since your last visit? Include any pap smears or colon screening.  No.

## 2017-12-15 ENCOUNTER — HOSPITAL ENCOUNTER (EMERGENCY)
Age: 32
Discharge: HOME | End: 2017-12-15
Attending: EMERGENCY MEDICINE | Admitting: EMERGENCY MEDICINE
Payer: MEDICAID

## 2017-12-15 ENCOUNTER — APPOINTMENT (OUTPATIENT)
Dept: CT IMAGING | Age: 32
End: 2017-12-15
Attending: EMERGENCY MEDICINE
Payer: MEDICAID

## 2017-12-15 VITALS
WEIGHT: 143 LBS | RESPIRATION RATE: 18 BRPM | HEIGHT: 67 IN | BODY MASS INDEX: 22.44 KG/M2 | DIASTOLIC BLOOD PRESSURE: 69 MMHG | TEMPERATURE: 98.4 F | HEART RATE: 90 BPM | SYSTOLIC BLOOD PRESSURE: 111 MMHG | OXYGEN SATURATION: 99 %

## 2017-12-15 DIAGNOSIS — K59.00 CONSTIPATION, UNSPECIFIED CONSTIPATION TYPE: Primary | ICD-10-CM

## 2017-12-15 LAB
ALBUMIN SERPL-MCNC: 3.9 G/DL (ref 3.5–5)
ALBUMIN/GLOB SERPL: 1.1 {RATIO} (ref 1.1–2.2)
ALP SERPL-CCNC: 50 U/L (ref 45–117)
ALT SERPL-CCNC: 13 U/L (ref 12–78)
ANION GAP SERPL CALC-SCNC: 9 MMOL/L (ref 5–15)
APPEARANCE UR: ABNORMAL
AST SERPL-CCNC: 14 U/L (ref 15–37)
BACTERIA URNS QL MICRO: ABNORMAL /HPF
BASOPHILS # BLD: 0 K/UL (ref 0–0.1)
BASOPHILS NFR BLD: 0 % (ref 0–1)
BILIRUB SERPL-MCNC: 0.7 MG/DL (ref 0.2–1)
BILIRUB UR QL: NEGATIVE
BUN SERPL-MCNC: 7 MG/DL (ref 6–20)
BUN/CREAT SERPL: 11 (ref 12–20)
CALCIUM SERPL-MCNC: 8.7 MG/DL (ref 8.5–10.1)
CHLORIDE SERPL-SCNC: 106 MMOL/L (ref 97–108)
CO2 SERPL-SCNC: 25 MMOL/L (ref 21–32)
COLOR UR: ABNORMAL
CREAT SERPL-MCNC: 0.64 MG/DL (ref 0.55–1.02)
EOSINOPHIL # BLD: 0.1 K/UL (ref 0–0.4)
EOSINOPHIL NFR BLD: 2 % (ref 0–7)
EPITH CASTS URNS QL MICRO: ABNORMAL /LPF
ERYTHROCYTE [DISTWIDTH] IN BLOOD BY AUTOMATED COUNT: 15.3 % (ref 11.5–14.5)
GLOBULIN SER CALC-MCNC: 3.4 G/DL (ref 2–4)
GLUCOSE SERPL-MCNC: 89 MG/DL (ref 65–100)
GLUCOSE UR STRIP.AUTO-MCNC: NEGATIVE MG/DL
HCG UR QL: NEGATIVE
HCT VFR BLD AUTO: 32.7 % (ref 35–47)
HGB BLD-MCNC: 11.4 G/DL (ref 11.5–16)
HGB UR QL STRIP: ABNORMAL
KETONES UR QL STRIP.AUTO: >80 MG/DL
LEUKOCYTE ESTERASE UR QL STRIP.AUTO: ABNORMAL
LIPASE SERPL-CCNC: 67 U/L (ref 73–393)
LYMPHOCYTES # BLD: 1.1 K/UL (ref 0.8–3.5)
LYMPHOCYTES NFR BLD: 16 % (ref 12–49)
MCH RBC QN AUTO: 28.7 PG (ref 26–34)
MCHC RBC AUTO-ENTMCNC: 34.9 G/DL (ref 30–36.5)
MCV RBC AUTO: 82.4 FL (ref 80–99)
MONOCYTES # BLD: 0.4 K/UL (ref 0–1)
MONOCYTES NFR BLD: 6 % (ref 5–13)
MUCOUS THREADS URNS QL MICRO: ABNORMAL /LPF
NEUTS SEG # BLD: 5 K/UL (ref 1.8–8)
NEUTS SEG NFR BLD: 76 % (ref 32–75)
NITRITE UR QL STRIP.AUTO: NEGATIVE
PH UR STRIP: 6.5 [PH] (ref 5–8)
PLATELET # BLD AUTO: 210 K/UL (ref 150–400)
POTASSIUM SERPL-SCNC: 3.7 MMOL/L (ref 3.5–5.1)
PROT SERPL-MCNC: 7.3 G/DL (ref 6.4–8.2)
PROT UR STRIP-MCNC: 30 MG/DL
RBC # BLD AUTO: 3.97 M/UL (ref 3.8–5.2)
RBC #/AREA URNS HPF: >100 /HPF (ref 0–5)
SODIUM SERPL-SCNC: 140 MMOL/L (ref 136–145)
SP GR UR REFRACTOMETRY: 1.01 (ref 1–1.03)
UA: UC IF INDICATED,UAUC: ABNORMAL
UROBILINOGEN UR QL STRIP.AUTO: 0.2 EU/DL (ref 0.2–1)
WBC # BLD AUTO: 6.5 K/UL (ref 3.6–11)
WBC URNS QL MICRO: ABNORMAL /HPF (ref 0–4)

## 2017-12-15 PROCEDURE — 36415 COLL VENOUS BLD VENIPUNCTURE: CPT | Performed by: NURSE PRACTITIONER

## 2017-12-15 PROCEDURE — 74011250636 HC RX REV CODE- 250/636: Performed by: NURSE PRACTITIONER

## 2017-12-15 PROCEDURE — 99284 EMERGENCY DEPT VISIT MOD MDM: CPT

## 2017-12-15 PROCEDURE — 96361 HYDRATE IV INFUSION ADD-ON: CPT

## 2017-12-15 PROCEDURE — 74011250637 HC RX REV CODE- 250/637: Performed by: EMERGENCY MEDICINE

## 2017-12-15 PROCEDURE — 83690 ASSAY OF LIPASE: CPT | Performed by: NURSE PRACTITIONER

## 2017-12-15 PROCEDURE — 81001 URINALYSIS AUTO W/SCOPE: CPT | Performed by: NURSE PRACTITIONER

## 2017-12-15 PROCEDURE — 80053 COMPREHEN METABOLIC PANEL: CPT | Performed by: NURSE PRACTITIONER

## 2017-12-15 PROCEDURE — 87086 URINE CULTURE/COLONY COUNT: CPT | Performed by: NURSE PRACTITIONER

## 2017-12-15 PROCEDURE — 81025 URINE PREGNANCY TEST: CPT

## 2017-12-15 PROCEDURE — 74176 CT ABD & PELVIS W/O CONTRAST: CPT

## 2017-12-15 PROCEDURE — 85025 COMPLETE CBC W/AUTO DIFF WBC: CPT | Performed by: NURSE PRACTITIONER

## 2017-12-15 PROCEDURE — 96374 THER/PROPH/DIAG INJ IV PUSH: CPT

## 2017-12-15 RX ORDER — MAGNESIUM CITRATE
296 SOLUTION, ORAL ORAL
Status: COMPLETED | OUTPATIENT
Start: 2017-12-15 | End: 2017-12-15

## 2017-12-15 RX ORDER — DOCUSATE SODIUM 100 MG/1
100 CAPSULE, LIQUID FILLED ORAL 2 TIMES DAILY
Qty: 30 CAP | Refills: 0 | Status: SHIPPED | OUTPATIENT
Start: 2017-12-15 | End: 2018-01-14

## 2017-12-15 RX ORDER — ONDANSETRON 2 MG/ML
4 INJECTION INTRAMUSCULAR; INTRAVENOUS
Status: COMPLETED | OUTPATIENT
Start: 2017-12-15 | End: 2017-12-15

## 2017-12-15 RX ADMIN — MAGESIUM CITRATE 296 ML: 1.75 LIQUID ORAL at 22:07

## 2017-12-15 RX ADMIN — SODIUM CHLORIDE 1000 ML: 900 INJECTION, SOLUTION INTRAVENOUS at 21:15

## 2017-12-15 RX ADMIN — ONDANSETRON 4 MG: 2 INJECTION, SOLUTION INTRAMUSCULAR; INTRAVENOUS at 21:15

## 2017-12-16 NOTE — DISCHARGE INSTRUCTIONS
Constipation: Care Instructions  Your Care Instructions    Constipation means that you have a hard time passing stools (bowel movements). People pass stools from 3 times a day to once every 3 days. What is normal for you may be different. Constipation may occur with pain in the rectum and cramping. The pain may get worse when you try to pass stools. Sometimes there are small amounts of bright red blood on toilet paper or the surface of stools. This is because of enlarged veins near the rectum (hemorrhoids). A few changes in your diet and lifestyle may help you avoid ongoing constipation. Your doctor may also prescribe medicine to help loosen your stool. Some medicines can cause constipation. These include pain medicines and antidepressants. Tell your doctor about all the medicines you take. Your doctor may want to make a medicine change to ease your symptoms. Follow-up care is a key part of your treatment and safety. Be sure to make and go to all appointments, and call your doctor if you are having problems. It's also a good idea to know your test results and keep a list of the medicines you take. How can you care for yourself at home? · Drink plenty of fluids, enough so that your urine is light yellow or clear like water. If you have kidney, heart, or liver disease and have to limit fluids, talk with your doctor before you increase the amount of fluids you drink. · Include high-fiber foods in your diet each day. These include fruits, vegetables, beans, and whole grains. · Get at least 30 minutes of exercise on most days of the week. Walking is a good choice. You also may want to do other activities, such as running, swimming, cycling, or playing tennis or team sports. · Take a fiber supplement, such as Citrucel or Metamucil, every day. Read and follow all instructions on the label. · Schedule time each day for a bowel movement. A daily routine may help.  Take your time having your bowel movement. · Support your feet with a small step stool when you sit on the toilet. This helps flex your hips and places your pelvis in a squatting position. · Your doctor may recommend an over-the-counter laxative to relieve your constipation. Examples are Milk of Magnesia and MiraLax. Read and follow all instructions on the label. Do not use laxatives on a long-term basis. When should you call for help? Call your doctor now or seek immediate medical care if:  ? · You have new or worse belly pain. ? · You have new or worse nausea or vomiting. ? · You have blood in your stools. ? Watch closely for changes in your health, and be sure to contact your doctor if:  ? · Your constipation is getting worse. ? · You do not get better as expected. Where can you learn more? Go to http://roula-susan.info/. Enter [phone] in the search box to learn more about \"Constipation: Care Instructions. \"  Current as of: March 20, 2017  Content Version: 11.4  © 9629-8638 [employer]. Care instructions adapted under license by [employer] (which disclaims liability or warranty for this information). If you have questions about a medical condition or this instruction, always ask your healthcare professional. [address] any warranty or liability for your use of this information.

## 2017-12-16 NOTE — ED PROVIDER NOTES
Patient is a 34 y.o. female presenting with abdominal pain. The history is provided by the patient. No  was used. Abdominal Pain    This is a new problem. The current episode started more than 2 days ago. The problem occurs daily. The problem has not changed since onset. The pain is associated with an unknown factor. The pain is located in the epigastric region and suprapubic region. The quality of the pain is aching. The pain is at a severity of 7/10. The pain is moderate. Associated symptoms include nausea, vomiting and constipation. Pertinent negatives include no fever, no headaches, no arthralgias, no myalgias and no chest pain. Associated symptoms comments: constipation. The pain is worsened by eating. The pain is relieved by nothing. Past workup includes CT scan.  Her past medical history is significant for UTI. none       Past Medical History:   Diagnosis Date    Abscess 12/1/2016    Anemia NEC     Bipolar 1 disorder (HCC)     Breast tenderness     bilateral, since 11/2016, on and off     Depression     Dizziness     Gastrointestinal disorder     pancreatitis    Increased frequency of headaches     Kidney stones 10/2012    Nausea     Nipple discharge 2009    white/clear discharge since birth of child     Other ill-defined conditions(799.89)     sickle cell trait    Other ill-defined conditions(799.89)     anemia    Psychiatric disorder     depression, bi-polar    Psychotic disorder     bipolar 1    Sickle cell trait ([address]     Sickle cell trait ([address]     Stomach pain        Past Surgical History:   Procedure Laterality Date    HX GYN      tubaligation    HX GYN      ectopic pregnancy surgery    HX OTHER SURGICAL      hx of blood transfusions         Family History:   Problem Relation Age of Onset    Heart Disease Father        Social History     Social History    Marital status: SINGLE     Spouse name: N/A    Number of children: N/A    Years of education: N/A Occupational History    Not on file. Social History Main Topics    Smoking status: Current Every Day Smoker     Packs/day: 0.25     Years: 9.00    Smokeless tobacco: Never Used    Alcohol use No    Drug use: No      Comment: occasional    Sexual activity: Not Currently     Partners: Male     Birth control/ protection: None     Other Topics Concern    Not on file     Social History Narrative         ALLERGIES: Latex; Naproxen; and Zyrtec [cetirizine]    Review of Systems   Constitutional: Negative for fatigue and fever. Respiratory: Negative for shortness of breath and wheezing. Cardiovascular: Negative for chest pain and palpitations. Gastrointestinal: Positive for abdominal pain, constipation, nausea and vomiting. Musculoskeletal: Negative for arthralgias, myalgias, neck pain and neck stiffness. Skin: Negative for pallor and rash. Neurological: Negative for dizziness, tremors, weakness and headaches. Hematological: Negative for adenopathy. Psychiatric/Behavioral: Negative for agitation and behavioral problems. All other systems reviewed and are negative. Vitals:    12/15/17 2002   BP: 111/69   Pulse: 90   Resp: 18   Temp: 98.4 °F (36.9 °C)   SpO2: 99%   Weight: 64.9 kg (143 lb)   Height: 5' 7\" (1.702 m)            Physical Exam   Constitutional: She is oriented to person, place, and time. She appears well-developed and well-nourished. No distress. HENT:   Head: Normocephalic and atraumatic. Right Ear: External ear normal.   Left Ear: External ear normal.   Nose: Nose normal.   Mouth/Throat: Oropharynx is clear and moist.   Eyes: Conjunctivae are normal.   Neck: Normal range of motion. Neck supple. Cardiovascular: Normal rate, regular rhythm and normal heart sounds. Pulmonary/Chest: Effort normal and breath sounds normal. No respiratory distress. She has no wheezes. Abdominal: Soft. Bowel sounds are normal. There is tenderness (epigastric  suprapubic). Musculoskeletal: Normal range of motion. Lymphadenopathy:     She has no cervical adenopathy. Neurological: She is alert and oriented to person, place, and time. No cranial nerve deficit. Coordination normal.   Skin: Skin is warm and dry. No rash noted. Psychiatric: She has a normal mood and affect. Her behavior is normal. Judgment and thought content normal.   Nursing note and vitals reviewed. MDM  Number of Diagnoses or Management Options  Diagnosis management comments: DDX constipation viral illness kidney stone SBO       Amount and/or Complexity of Data Reviewed  Clinical lab tests: ordered and reviewed  Tests in the radiology section of CPT®: ordered and reviewed  Review and summarize past medical records: yes    Patient Progress  Patient progress: stable    ED Course       Procedures    9:01 PM  I have just reevaluated the patient. Results have been reviewed with them and their questions have been answered. We will continue to review further results as they come available. 10:11 PM  Cecilio Wei's final results have been reviewed with her. She has been counseled regarding her diagnosis. She verbally conveys understanding and agreement of the signs, symptoms, diagnosis, treatment and prognosis . LABORATORY TESTS:  Recent Results (from the past 12 hour(s))   CBC WITH AUTOMATED DIFF    Collection Time: 12/15/17  9:14 PM   Result Value Ref Range    WBC 6.5 3.6 - 11.0 K/uL    RBC 3.97 3.80 - 5.20 M/uL    HGB 11.4 (L) 11.5 - 16.0 g/dL    HCT 32.7 (L) 35.0 - 47.0 %    MCV 82.4 80.0 - 99.0 FL    MCH 28.7 26.0 - 34.0 PG    MCHC 34.9 30.0 - 36.5 g/dL    RDW 15.3 (H) 11.5 - 14.5 %    PLATELET 053 172 - 304 K/uL    NEUTROPHILS 76 (H) 32 - 75 %    LYMPHOCYTES 16 12 - 49 %    MONOCYTES 6 5 - 13 %    EOSINOPHILS 2 0 - 7 %    BASOPHILS 0 0 - 1 %    ABS. NEUTROPHILS 5.0 1.8 - 8.0 K/UL    ABS. LYMPHOCYTES 1.1 0.8 - 3.5 K/UL    ABS. MONOCYTES 0.4 0.0 - 1.0 K/UL    ABS.  EOSINOPHILS 0.1 0.0 - 0.4 K/UL    ABS. BASOPHILS 0.0 0.0 - 0.1 K/UL   METABOLIC PANEL, COMPREHENSIVE    Collection Time: 12/15/17  9:14 PM   Result Value Ref Range    Sodium 140 136 - 145 mmol/L    Potassium 3.7 3.5 - 5.1 mmol/L    Chloride 106 97 - 108 mmol/L    CO2 25 21 - 32 mmol/L    Anion gap 9 5 - 15 mmol/L    Glucose 89 65 - 100 mg/dL    BUN 7 6 - 20 MG/DL    Creatinine 0.64 0.55 - 1.02 MG/DL    BUN/Creatinine ratio 11 (L) 12 - 20      GFR est AA >60 >60 ml/min/1.73m2    GFR est non-AA >60 >60 ml/min/1.73m2    Calcium 8.7 8.5 - 10.1 MG/DL    Bilirubin, total 0.7 0.2 - 1.0 MG/DL    ALT (SGPT) 13 12 - 78 U/L    AST (SGOT) 14 (L) 15 - 37 U/L    Alk. phosphatase 50 45 - 117 U/L    Protein, total 7.3 6.4 - 8.2 g/dL    Albumin 3.9 3.5 - 5.0 g/dL    Globulin 3.4 2.0 - 4.0 g/dL    A-G Ratio 1.1 1.1 - 2.2     LIPASE    Collection Time: 12/15/17  9:14 PM   Result Value Ref Range    Lipase 67 (L) 73 - 393 U/L   URINALYSIS W/ REFLEX CULTURE    Collection Time: 12/15/17  9:14 PM   Result Value Ref Range    Color RED      Appearance CLOUDY (A) CLEAR      Specific gravity 1.015 1.003 - 1.030      pH (UA) 6.5 5.0 - 8.0      Protein 30 (A) NEG mg/dL    Glucose NEGATIVE  NEG mg/dL    Ketone >80 (A) NEG mg/dL    Bilirubin NEGATIVE  NEG      Blood LARGE (A) NEG      Urobilinogen 0.2 0.2 - 1.0 EU/dL    Nitrites NEGATIVE  NEG      Leukocyte Esterase TRACE (A) NEG      WBC 5-10 0 - 4 /hpf    RBC >100 (H) 0 - 5 /hpf    Epithelial cells FEW FEW /lpf    Bacteria 1+ (A) NEG /hpf    UA:UC IF INDICATED URINE CULTURE ORDERED (A) CNI      Mucus TRACE (A) NEG /lpf   HCG URINE, QL. - POC    Collection Time: 12/15/17  9:16 PM   Result Value Ref Range    Pregnancy test,urine (POC) NEGATIVE  NEG         IMAGING RESULTS:  CT ABD PELV WO CONT   Final Result      EXAM:  CT ABD PELV WO CONT     INDICATION: Abdominal pain  , vomiting for 2 hours.  Constipation.     COMPARISON: 12/1/2016     CONTRAST:  None.     TECHNIQUE:   Thin axial images were obtained through the abdomen and pelvis. Coronal and  sagittal reconstructions were generated. Oral contrast was not administered. CT  dose reduction was achieved through use of a standardized protocol tailored for  this examination and automatic exposure control for dose modulation.      The absence of intravenous contrast material reduces the sensitivity for  evaluation of the solid parenchymal organs of the abdomen.      FINDINGS:   LUNG BASES: Clear. INCIDENTALLY IMAGED HEART AND MEDIASTINUM: Unremarkable. LIVER: Subcentimeter focal hypodensity in the right hepatic lobe too small to  characterize, but stable. GALLBLADDER: Unremarkable. SPLEEN: No mass. PANCREAS: No mass or ductal dilatation. ADRENALS: Unremarkable. KIDNEYS/URETERS: No mass, calculus, or hydronephrosis. STOMACH: Unremarkable. SMALL BOWEL: No dilatation or wall thickening. COLON: Rectal fecal impaction with rectal distention. Large amount of retained  fecal material elsewhere throughout the colon without distention. APPENDIX: Unremarkable. PERITONEUM: No ascites or pneumoperitoneum. RETROPERITONEUM: No lymphadenopathy or aortic aneurysm. REPRODUCTIVE ORGANS: Unremarkable for age, with dominant bilateral ovarian  cysts. URINARY BLADDER: No mass or calculus. BONES: No destructive bone lesion. ADDITIONAL COMMENTS: N/A     IMPRESSION  IMPRESSION:     1. Large amount of retained fecal material with rectal fecal impaction. 2. Otherwise unremarkable abdominal and pelvic CT for age without contrast.    MEDICATIONS GIVEN:  Medications   sodium chloride 0.9 % bolus infusion 1,000 mL (1,000 mL IntraVENous New Bag 12/15/17 2115)   ondansetron (ZOFRAN) injection 4 mg (4 mg IntraVENous Given 12/15/17 2115)   magnesium citrate solution 296 mL (296 mL Oral Given 12/15/17 2207)       IMPRESSION:  1. Constipation, unspecified constipation type        PLAN:  1.    Current Discharge Medication List      START taking these medications    Details   docusate sodium (DULCOLAX STOOL SOFTENER, DSS,) 100 mg capsule Take 1 Cap by mouth two (2) times a day for 30 days. Qty: 30 Cap, Refills: 0           2. Follow-up Information     Follow up With Details Comments [address] NP   [facility] [address]  [phone]          Return to ED if worse       DISCHARGE NOTE  10:10 PM  The patient has been re-evaluated and is ready for discharge. Reviewed available results with patient. Counseled pt on diagnosis and care plan. Pt has expressed understanding, and all questions have been answered. Pt agrees with plan and agrees to F/U as recommended, or return to the ED if their sxs worsen. Discharge instructions have been provided and explained to the pt, along with reasons to return to the ED. This note is prepared by Ana Moore, acting as Scribe for Velasquez Viera MD.    Velasquez Viera MD: The scribe's documentation has been prepared under my direction and personally reviewed by me in its entirety. I confirm that the note above accurately reflects all work, treatment, procedures, and medical decision making performed by me.

## 2017-12-16 NOTE — ED NOTES
Discharge instructions were given to the patient by Janny Champagne RN  . The patient left the Emergency Department ambulatory, alert and oriented and in no acute distress with 1 prescriptions. The patient was encouraged to call or return to the ED for worsening issues or problems and was encouraged to schedule a follow up appointment for continuing care. The patient verbalized understanding of discharge instructions and prescriptions, all questions were answered. The patient has no further concerns at this time.

## 2017-12-16 NOTE — ED NOTES
Pt presents to ED via EMS complaining of N/v/constipation, abdominal pain, generalized weakness and body aches x1 day. Pt denies taking medications for relief. Pt is alert and oriented x 4, RR even and unlabored, skin is warm and dry. Assessment completed and pt updated on plan of care. Emergency Department Nursing Plan of Care       The Nursing Plan of Care is developed from the Nursing assessment and Emergency Department Attending provider initial evaluation. The plan of care may be reviewed in the ED Provider note.     The Plan of Care was developed with the following considerations:   Patient / Family readiness to learn indicated by:verbalized understanding  Persons(s) to be included in education: patient  Barriers to Learning/Limitations:No    Signed     Tegan Squires RN    12/15/2017   9:01 PM

## 2017-12-17 LAB
BACTERIA SPEC CULT: NORMAL
CC UR VC: NORMAL
SERVICE CMNT-IMP: NORMAL

## 2018-03-01 ENCOUNTER — HOSPITAL ENCOUNTER (EMERGENCY)
Age: 33
Discharge: HOME | End: 2018-03-02
Attending: EMERGENCY MEDICINE | Admitting: EMERGENCY MEDICINE
Payer: MEDICAID

## 2018-03-01 VITALS
SYSTOLIC BLOOD PRESSURE: 120 MMHG | TEMPERATURE: 97.8 F | RESPIRATION RATE: 20 BRPM | HEIGHT: 67 IN | HEART RATE: 84 BPM | WEIGHT: 143 LBS | OXYGEN SATURATION: 100 % | BODY MASS INDEX: 22.44 KG/M2 | DIASTOLIC BLOOD PRESSURE: 67 MMHG

## 2018-03-01 DIAGNOSIS — B34.9 VIRAL ILLNESS: Primary | ICD-10-CM

## 2018-03-01 PROCEDURE — 99283 EMERGENCY DEPT VISIT LOW MDM: CPT

## 2018-03-01 RX ORDER — LORATADINE 10 MG/1
10 TABLET ORAL
COMMUNITY
End: 2018-08-22

## 2018-03-02 PROCEDURE — 74011250637 HC RX REV CODE- 250/637: Performed by: EMERGENCY MEDICINE

## 2018-03-02 RX ORDER — PROMETHAZINE HYDROCHLORIDE 25 MG/1
25 SUPPOSITORY RECTAL
Qty: 12 SUPPOSITORY | Refills: 0 | Status: SHIPPED | OUTPATIENT
Start: 2018-03-02 | End: 2018-08-22 | Stop reason: ALTCHOICE

## 2018-03-02 RX ORDER — CODEINE PHOSPHATE AND GUAIFENESIN 10; 100 MG/5ML; MG/5ML
10 SOLUTION ORAL
Status: COMPLETED | OUTPATIENT
Start: 2018-03-02 | End: 2018-03-02

## 2018-03-02 RX ORDER — PROMETHAZINE HYDROCHLORIDE 25 MG/1
25 TABLET ORAL
Status: COMPLETED | OUTPATIENT
Start: 2018-03-02 | End: 2018-03-02

## 2018-03-02 RX ORDER — CODEINE PHOSPHATE AND GUAIFENESIN 10; 100 MG/5ML; MG/5ML
10 SOLUTION ORAL
Qty: 1 BOTTLE | Refills: 0 | Status: SHIPPED | OUTPATIENT
Start: 2018-03-02 | End: 2019-07-02

## 2018-03-02 RX ADMIN — GUAIFENESIN AND CODEINE PHOSPHATE 10 ML: 10; 100 LIQUID ORAL at 01:01

## 2018-03-02 RX ADMIN — PROMETHAZINE HYDROCHLORIDE 25 MG: 25 TABLET ORAL at 01:01

## 2018-03-02 NOTE — ED PROVIDER NOTES
EMERGENCY DEPARTMENT HISTORY AND PHYSICAL EXAM      Date: 3/1/2018  Patient Name: Ofelia Turcios    History of Presenting Illness     Chief Complaint   Patient presents with    Cold Symptoms     X 5 days w/ productive cough, L to R sided CP, SOB, headache, and nausea & vomiting       History Provided By: Patient    HPI: Ofelia Turcios, 34 y.o. female with PMHx significant for kidney stones, anemia, presents ambulatory to the ED with a constellation of persistent symptoms that include diffuse HA, dry cough, diffuse post-tussive CP, sore throat, nasal congestion, nausea and vomiting x 6 days. She describes her post-tussive chest pain and HA as a constant aching pain that is exacerbated with coughing. She rates her pain 8/10. Pt denies sick contacts. She denies having a hx of influenza. Pt denies drinking alcohol and denies hx of pancreatitis. She denies chance of pregnancy. Pt denies allergies to medications. Pt specifically denies fever, chills, diarrhea, abdominal pain, dysuria, hematuria. PCP: Leona Phillips, NP    There are no other complaints, changes, or physical findings at this time. Current Facility-Administered Medications   Medication Dose Route Frequency Provider Last Rate Last Dose    promethazine (PHENERGAN) tablet 25 mg  25 mg Oral NOW Tex Morales MD        guaiFENesin-codeine [facility]) 100-10 mg/5 mL solution 10 mL  10 mL Oral NOW Tex Morales MD         Current Outpatient Prescriptions   Medication Sig Dispense Refill    promethazine (PHENERGAN) 25 mg suppository Insert 1 Suppository into rectum every six (6) hours as needed for Nausea. 12 Suppository 0    guaiFENesin-codeine (ROBITUSSIN AC) 100-10 mg/5 mL solution Take 10 mL by mouth three (3) times daily as needed for Cough. Max Daily Amount: 30 mL. 1 Bottle 0    loratadine (CLARITIN) 10 mg tablet Take 10 mg by mouth.  FLUTICASONE PROPIONATE (FLONASE NA) by Nasal route.          Past History     Past Medical History:  Past Medical History:   Diagnosis Date    Abscess 12/1/2016    Anemia NEC     Bipolar 1 disorder ([address]     Breast tenderness     bilateral, since 11/2016, on and off     Depression     Dizziness     Gastrointestinal disorder     pancreatitis    Increased frequency of headaches     Kidney stones 10/2012    Nausea     Nipple discharge 2009    white/clear discharge since birth of child     Other ill-defined conditions(799.89)     sickle cell trait    Other ill-defined conditions(799.89)     anemia    Psychiatric disorder     depression, bi-polar    Psychotic disorder     bipolar 1    Sickle cell trait ([address]     Sickle cell trait ([address]     Stomach pain        Past Surgical History:  Past Surgical History:   Procedure Laterality Date    HX GYN      tubaligation    HX GYN      ectopic pregnancy surgery    HX OTHER SURGICAL      hx of blood transfusions       Family History:  Family History   Problem Relation Age of Onset    Heart Disease Father        Social History:  Social History   Substance Use Topics    Smoking status: Current Every Day Smoker     Packs/day: 0.25     Years: 9.00    Smokeless tobacco: Never Used    Alcohol use No       Allergies: Allergies   Allergen Reactions    Latex Itching    Naproxen Vertigo    Zyrtec [Cetirizine] Vertigo         Review of Systems   Review of Systems   Constitutional: Negative for chills and fever. HENT: Positive for sore throat. Negative for rhinorrhea. Congestion: nasal.    Eyes: Negative for photophobia and discharge. Respiratory: Positive for cough (dry). Negative for shortness of breath. Cardiovascular: Positive for chest pain (post-tussive; diffuse). Negative for palpitations. Gastrointestinal: Positive for nausea and vomiting. Negative for abdominal pain and diarrhea. Genitourinary: Negative for dysuria and hematuria. Musculoskeletal: Negative for back pain and neck pain. Skin: Negative for rash and wound. Allergic/Immunologic: Negative for immunocompromised state. Neurological: Positive for headaches (diffuse). Negative for dizziness and light-headedness. Psychiatric/Behavioral: Negative for suicidal ideas. Physical Exam   Physical Exam   Constitutional: She is oriented to person, place, and time. She appears well-developed and well-nourished. No distress (NAD). HENT:   Head: Normocephalic and atraumatic. Mouth/Throat: Oropharynx is clear and moist.   Eyes: Conjunctivae and EOM are normal.   Neck: Normal range of motion and full passive range of motion without pain. Neck supple. Cardiovascular: Normal rate, regular rhythm, S1 normal, S2 normal, normal heart sounds, intact distal pulses and normal pulses. No murmur heard. Pulmonary/Chest: Effort normal and breath sounds normal. No respiratory distress. She has no wheezes. Abdominal: Soft. Normal appearance and bowel sounds are normal. She exhibits no distension. There is no tenderness. There is no rebound. Musculoskeletal: Normal range of motion. Neurological: She is alert and oriented to person, place, and time. She has normal strength. Skin: Skin is warm, dry and intact. No rash noted. Cap refill less than 2 seconds   Psychiatric: She has a normal mood and affect. Her speech is normal and behavior is normal. Judgment and thought content normal.   Nursing note and vitals reviewed. Medical Decision Making   I am the first provider for this patient. I reviewed the vital signs, available nursing notes, past medical history, past surgical history, family history and social history. Vital Signs-Reviewed the patient's vital signs. Patient Vitals for the past 12 hrs:   Temp Pulse Resp BP SpO2   03/01/18 2343 97.8 °F (36.6 °C) 84 20 120/67 100 %       Records Reviewed: Old Medical Records    Provider Notes (Medical Decision Making):   DDx: viral illness, unlikely PNA    ED Course:   Initial assessment performed.  The patients presenting problems have been discussed, and they are in agreement with the care plan formulated and outlined with them. I have encouraged them to ask questions as they arise throughout their visit. Disposition:  DISCHARGE NOTE  12:50 AM  The patient has been re-evaluated and is ready for discharge. Reviewed available results with patient. Counseled pt on diagnosis and care plan. Pt has expressed understanding, and all questions have been answered. Pt agrees with plan and agrees to F/U as recommended, or return to the ED if their sxs worsen. Discharge instructions have been provided and explained to the pt, along with reasons to return to the ED. Written by Alexia Klein ED Scribe, as dictated by Flaco Kothari MD.     PLAN:  1. Current Discharge Medication List      START taking these medications    Details   promethazine (PHENERGAN) 25 mg suppository Insert 1 Suppository into rectum every six (6) hours as needed for Nausea. Qty: 12 Suppository, Refills: 0    Associated Diagnoses: Viral illness      guaiFENesin-codeine (ROBITUSSIN AC) 100-10 mg/5 mL solution Take 10 mL by mouth three (3) times daily as needed for Cough. Max Daily Amount: 30 mL. Qty: 1 Bottle, Refills: 0    Associated Diagnoses: Viral illness           2. Follow-up Information     Follow up With Details Comments [address], NP   [facility]  [address]  [address] [place] [address]  [phone]          Return to ED if worse     Diagnosis     Clinical Impression:   1. Viral illness        Attestations: This note is prepared by Alexia Klein, acting as Scribe for Flaco Kothari MD.    Flaco Kothari MD: The scribe's documentation has been prepared under my direction and personally reviewed by me in its entirety. I confirm that the note above accurately reflects all work, treatment, procedures, and medical decision making performed by me.

## 2018-03-02 NOTE — DISCHARGE INSTRUCTIONS
Viral Infections: Care Instructions  Your Care Instructions    You don't feel well, but it's not clear what's causing it. You may have a viral infection. Viruses cause many illnesses, such as the common cold, influenza, fever, rashes, and the diarrhea, nausea, and vomiting that are often called \"stomach flu. \" You may wonder if antibiotic medicines could make you feel better. But antibiotics only treat infections caused by bacteria. They don't work on viruses. The good news is that viral infections usually aren't serious. Most will go away in a few days without medical treatment. In the meantime, there are a few things you can do to make yourself more comfortable. Follow-up care is a key part of your treatment and safety. Be sure to make and go to all appointments, and call your doctor if you are having problems. It's also a good idea to know your test results and keep a list of the medicines you take. How can you care for yourself at home? · Get plenty of rest if you feel tired. · Take an over-the-counter pain medicine if needed, such as acetaminophen (Tylenol), ibuprofen (Advil, Motrin), or naproxen (Aleve). Read and follow all instructions on the label. · Be careful when taking over-the-counter cold or flu medicines and Tylenol at the same time. Many of these medicines have acetaminophen, which is Tylenol. Read the labels to make sure that you are not taking more than the recommended dose. Too much acetaminophen (Tylenol) can be harmful. · Drink plenty of fluids, enough so that your urine is light yellow or clear like water. If you have kidney, heart, or liver disease and have to limit fluids, talk with your doctor before you increase the amount of fluids you drink. · Stay home from work, school, and other public places while you have a fever. When should you call for help? Call 911 anytime you think you may need emergency care. For example, call if:  ? · You have severe trouble breathing.    ? · You passed out (lost consciousness). ?Call your doctor now or seek immediate medical care if:  ? · You seem to be getting much sicker. ? · You have a new or higher fever. ? · You have blood in your stools. ? · You have new belly pain, or your pain gets worse. ? · You have a new rash. ? Watch closely for changes in your health, and be sure to contact your doctor if:  ? · You start to get better and then get worse. ? · You do not get better as expected. Where can you learn more? Go to http://roula-susan.info/. Enter C703 in the search box to learn more about \"Viral Infections: Care Instructions. \"  Current as of: March 3, 2017  Content Version: 11.4  © 8377-5378 [employer]. Care instructions adapted under license by [employer] (which disclaims liability or warranty for this information). If you have questions about a medical condition or this instruction, always ask your healthcare professional. [address] any warranty or liability for your use of this information.

## 2018-08-22 ENCOUNTER — HOSPITAL ENCOUNTER (EMERGENCY)
Age: 33
Discharge: HOME | End: 2018-08-22
Attending: EMERGENCY MEDICINE
Payer: MEDICAID

## 2018-08-22 VITALS
RESPIRATION RATE: 18 BRPM | WEIGHT: 142 LBS | DIASTOLIC BLOOD PRESSURE: 78 MMHG | BODY MASS INDEX: 21.03 KG/M2 | HEART RATE: 96 BPM | HEIGHT: 69 IN | SYSTOLIC BLOOD PRESSURE: 132 MMHG | OXYGEN SATURATION: 99 % | TEMPERATURE: 98.6 F

## 2018-08-22 DIAGNOSIS — J06.9 ACUTE UPPER RESPIRATORY INFECTION: ICD-10-CM

## 2018-08-22 DIAGNOSIS — N30.00 ACUTE CYSTITIS WITHOUT HEMATURIA: Primary | ICD-10-CM

## 2018-08-22 LAB
APPEARANCE UR: ABNORMAL
BACTERIA URNS QL MICRO: ABNORMAL /HPF
BILIRUB UR QL: NEGATIVE
COLOR UR: ABNORMAL
EPITH CASTS URNS QL MICRO: ABNORMAL /LPF
GLUCOSE UR STRIP.AUTO-MCNC: NEGATIVE MG/DL
HCG UR QL: NEGATIVE
HGB UR QL STRIP: NEGATIVE
KETONES UR QL STRIP.AUTO: NEGATIVE MG/DL
LEUKOCYTE ESTERASE UR QL STRIP.AUTO: NEGATIVE
NITRITE UR QL STRIP.AUTO: NEGATIVE
PH UR STRIP: 5 [PH] (ref 5–8)
PROT UR STRIP-MCNC: NEGATIVE MG/DL
RBC #/AREA URNS HPF: ABNORMAL /HPF (ref 0–5)
SP GR UR REFRACTOMETRY: 1.01 (ref 1–1.03)
UA: UC IF INDICATED,UAUC: ABNORMAL
UROBILINOGEN UR QL STRIP.AUTO: 0.2 EU/DL (ref 0.2–1)
WBC URNS QL MICRO: ABNORMAL /HPF (ref 0–4)

## 2018-08-22 PROCEDURE — 87147 CULTURE TYPE IMMUNOLOGIC: CPT | Performed by: PHYSICIAN ASSISTANT

## 2018-08-22 PROCEDURE — 81001 URINALYSIS AUTO W/SCOPE: CPT | Performed by: PHYSICIAN ASSISTANT

## 2018-08-22 PROCEDURE — 81025 URINE PREGNANCY TEST: CPT

## 2018-08-22 PROCEDURE — 87086 URINE CULTURE/COLONY COUNT: CPT | Performed by: PHYSICIAN ASSISTANT

## 2018-08-22 PROCEDURE — 99283 EMERGENCY DEPT VISIT LOW MDM: CPT

## 2018-08-22 PROCEDURE — 74011250637 HC RX REV CODE- 250/637: Performed by: PHYSICIAN ASSISTANT

## 2018-08-22 RX ORDER — ONDANSETRON 4 MG/1
4 TABLET, ORALLY DISINTEGRATING ORAL
Qty: 6 TAB | Refills: 0 | Status: SHIPPED | OUTPATIENT
Start: 2018-08-22 | End: 2019-07-02

## 2018-08-22 RX ORDER — CEPHALEXIN 250 MG/5ML
500 POWDER, FOR SUSPENSION ORAL EVERY 12 HOURS
Qty: 100 ML | Refills: 0 | Status: SHIPPED | OUTPATIENT
Start: 2018-08-22 | End: 2018-08-27

## 2018-08-22 RX ORDER — CEPHALEXIN 500 MG/1
500 CAPSULE ORAL 2 TIMES DAILY
Qty: 10 CAP | Refills: 0 | Status: SHIPPED | OUTPATIENT
Start: 2018-08-22 | End: 2018-08-22

## 2018-08-22 RX ORDER — LORATADINE 10 MG
10 TABLET,DISINTEGRATING ORAL DAILY
Qty: 14 TAB | Refills: 0 | Status: SHIPPED | OUTPATIENT
Start: 2018-08-22 | End: 2019-07-02

## 2018-08-22 RX ORDER — ONDANSETRON 4 MG/1
4 TABLET, ORALLY DISINTEGRATING ORAL
Status: COMPLETED | OUTPATIENT
Start: 2018-08-22 | End: 2018-08-22

## 2018-08-22 RX ADMIN — ONDANSETRON 4 MG: 4 TABLET, ORALLY DISINTEGRATING ORAL at 16:02

## 2018-08-22 NOTE — ED NOTES
Pt presents to the ED with c/o lower abdominal pain that radiates to her back x3 days. Pt reports the roof of her mouth is painful since yesterday. Pt reports that the right side of her face and right ear are painful. Pt denies urinary symptoms. Pt reports vomiting Monday and Tuesday night Pt reports drainage form her nose and spitting up green mucus since last night. Pt reports taking Nyquil. Pt is alert and oriented. Pt skin is warm and dry. Pt is ambulatory independently. Emergency Department Nursing Plan of Care       The Nursing Plan of Care is developed from the Nursing assessment and Emergency Department Attending provider initial evaluation. The plan of care may be reviewed in the ED Provider note.     The Plan of Care was developed with the following considerations:   Patient / Family readiness to learn indicated by:verbalized understanding  Persons(s) to be included in education: patient  Barriers to Learning/Limitations:No    Signed     Alma Quintero    8/22/2018   3:54 PM

## 2018-08-22 NOTE — ED NOTES

## 2018-08-22 NOTE — ED PROVIDER NOTES
EMERGENCY DEPARTMENT HISTORY AND PHYSICAL EXAM    Date: 8/22/2018  Patient Name: Donal Diaz    History of Presenting Illness     Chief Complaint   Patient presents with    Abdominal Pain     radiating to back x 2 days    Mouth Pain     since yesterday         History Provided By: Patient    HPI: Donal Diaz is a 34 y.o. female with a PMH of abscess, anemia, bipolar, depression, pancreatitis, kidney stones, and sickle cell trait who presents with acute abdominal pain x 2 days with associated symptoms of left sided facial pain, mouth pain, productive cough (green sputum), sneezing, rhinorrhea, and right ear pain. Pt states that abdominal pain is localized on her suprapubic region, non-radiating, sharp in character, and constant. Pt reports no relief of cold symptoms with Nyquil. Pt also reports nausea, vomiting x 2, and fever of 102 taken at home. Pt rates pain 9/10    PCP: Shelly Ingram NP    Current Outpatient Prescriptions   Medication Sig Dispense Refill    ondansetron (ZOFRAN ODT) 4 mg disintegrating tablet Take 1 Tab by mouth every eight (8) hours as needed for Nausea. 6 Tab 0    cephALEXin (KEFLEX) 250 mg/5 mL suspension Take 10 mL by mouth every twelve (12) hours for 5 days. 100 mL 0    loratadine (CLARITIN REDITABS) 10 mg dissolvable tablet Take 1 Tab by mouth daily. 14 Tab 0    FLUTICASONE PROPIONATE (FLONASE NA) by Nasal route.  guaiFENesin-codeine (ROBITUSSIN AC) 100-10 mg/5 mL solution Take 10 mL by mouth three (3) times daily as needed for Cough. Max Daily Amount: 30 mL.  1 Bottle 0       Past History     Past Medical History:  Past Medical History:   Diagnosis Date    Abscess 12/1/2016    Anemia NEC     Bipolar 1 disorder ([facility] [address]     Breast tenderness     bilateral, since 11/2016, on and off     Depression     Dizziness     Gastrointestinal disorder     pancreatitis    Increased frequency of headaches     Kidney stones 10/2012    Nausea     Nipple discharge 2009 white/clear discharge since birth of child     Other ill-defined conditions(799.89)     sickle cell trait    Other ill-defined conditions(799.89)     anemia    Psychiatric disorder     depression, bi-polar    Psychotic disorder     bipolar 1    Sickle cell trait (HCC)     Sick[facility] cell trait ([facility] [address]     Stomach pain        Past Surgical History:  Past Surgical History:   Procedure Laterality Date    HX GYN      tubaligation    HX GYN      ectopic pregnancy surgery    HX OTHER SURGICAL      hx of blood transfusions       Family History:  Family History   Problem Relation Age of Onset    Heart Disease Father        Social History:  Social History   Substance Use Topics    Smoking status: Current Every Day Smoker     Packs/day: 0.25     Years: 9.00    Smokeless tobacco: Never Used    Alcohol use No       Allergies: Allergies   Allergen Reactions    Latex Itching    Naproxen Vertigo    Zyrtec [Cetirizine] Vertigo         Review of Systems   Review of Systems   Constitutional: Positive for appetite change and fever. Negative for chills. HENT: Positive for ear pain (Right), sinus pain (Right frontal and maxillary), sneezing and sore throat. Negative for dental problem, ear discharge, facial swelling and sinus pressure. Eyes: Negative for visual disturbance. Respiratory: Positive for cough. Negative for shortness of breath, wheezing and stridor. Cardiovascular: Negative for chest pain. Gastrointestinal: Positive for abdominal pain (Suprapubic), nausea and vomiting. Negative for constipation and diarrhea. All other systems reviewed and are negative. Physical Exam     Vitals:    08/22/18 1520 08/22/18 1523   BP: 132/78    Pulse: 96    Resp: 18    Temp: 98.6 °F (37 °C)    SpO2: 99%    Weight:  64.4 kg (142 lb)   Height: 5' 9\" (1.753 m)      Physical Exam   Constitutional: She is oriented to person, place, and time. She appears well-developed and well-nourished.    HENT:   Head: Normocephalic and atraumatic. Right Ear: Tympanic membrane and ear canal normal. There is tenderness. No drainage or swelling. Left Ear: Tympanic membrane, external ear and ear canal normal. No drainage, swelling or tenderness. Nose: Nose normal.   Mouth/Throat: Uvula is midline and oropharynx is clear and moist.       Round palatine protrusion 2 cm in diameter   Eyes: Conjunctivae are normal.   Neck: Normal range of motion. Cardiovascular: Normal rate, regular rhythm and normal heart sounds. Pulmonary/Chest: Effort normal and breath sounds normal. No respiratory distress. She has no wheezes. Abdominal: Soft. Normal appearance and bowel sounds are normal. There is tenderness in the suprapubic area. Neurological: She is alert and oriented to person, place, and time. Skin: Skin is warm and dry. Psychiatric: She has a normal mood and affect. Her behavior is normal.   Nursing note and vitals reviewed. at 11:33 AM    Diagnostic Study Results     Labs -   No results found for this or any previous visit (from the past 12 hour(s)). Radiologic Studies -   No orders to display     CT Results  (Last 48 hours)    None        CXR Results  (Last 48 hours)    None            Medical Decision Making   I am the first provider for this patient. I reviewed the vital signs, available nursing notes, past medical history, past surgical history, family history and social history. Vital Signs-Reviewed the patient's vital signs. ED Course:   Pt seen and note written by AERLY Luna, in conjunction with this provider. Stanislav Maza PA-C    Disposition:  Discharged    DISCHARGE NOTE:   426 PM      Care plan outlined and precautions discussed. Patient has no new complaints, changes, or physical findings. Results of labs were reviewed with the patient. All medications were reviewed with the patient; will d/c home. All of pt's questions and concerns were addressed.  Patient was instructed and agrees to follow up with PCP prn, as well as to return to the ED upon further deterioration. Patient is ready to go home. Follow-up Information     Follow up With Details Comments [address] Aw[address] Villalta NP Schedule an appointment as soon as possible for a visit in 2 days As needed [place]  [address]  [phone]            Discharge Medication List as of 8/22/2018  4:26 PM      START taking these medications    Details   ondansetron (ZOFRAN ODT) 4 mg disintegrating tablet Take 1 Tab by mouth every eight (8) hours as needed for Nausea., Normal, Disp-6 Tab, R-0      cephALEXin (KEFLEX) 250 mg/5 mL suspension Take 10 mL by mouth every twelve (12) hours for 5 days. , Normal, Disp-100 mL, R-0      loratadine (CLARITIN REDITABS) 10 mg dissolvable tablet Take 1 Tab by mouth daily. , Normal, Disp-14 Tab, R-0         CONTINUE these medications which have NOT CHANGED    Details   FLUTICASONE PROPIONATE (FLONASE NA) by Nasal route., Historical Med      guaiFENesin-codeine (ROBITUSSIN AC) 100-10 mg/5 mL solution Take 10 mL by mouth three (3) times daily as needed for Cough. Max Daily Amount: 30 mL., Print, Disp-1 Bottle, R-0         STOP taking these medications       promethazine (PHENERGAN) 25 mg suppository Comments:   Reason for Stopping:         loratadine (CLARITIN) 10 mg tablet Comments:   Reason for Stopping:               Provider Notes (Medical Decision Making):   DDx: Upper respiratory infection, sinusitis, bronchitis, pharyngitis, UTI, constipation    Procedures        Diagnosis     Clinical Impression:   1. Acute cystitis without hematuria    2.  Acute upper respiratory infection

## 2018-08-22 NOTE — DISCHARGE INSTRUCTIONS
Saline Nasal Washes: Care Instructions  Your Care Instructions  Saline nasal washes help keep the nasal passages open by washing out thick or dried mucus. This simple remedy can help relieve symptoms of allergies, sinusitis, and colds. It also can make the nose feel more comfortable by keeping the mucous membranes moist. You may notice a little burning sensation in your nose the first few times you use the solution, but this usually gets better in a few days. Follow-up care is a key part of your treatment and safety. Be sure to make and go to all appointments, and call your doctor if you are having problems. It's also a good idea to know your test results and keep a list of the medicines you take. How can you care for yourself at home? · You can buy premixed saline solution in a squeeze bottle or other sinus rinse products at a drugstore. Read and follow the instructions on the label. · You also can make your own saline solution by adding 1 teaspoon of salt and 1 teaspoon of baking soda to 2 cups of distilled water. · If you use a homemade solution, pour a small amount into a clean bowl. Using a rubber bulb syringe, squeeze the syringe and place the tip in the salt water. Pull a small amount of the salt water into the syringe by relaxing your hand. · Sit down with your head tilted slightly back. Do not lie down. Put the tip of the bulb syringe or the squeeze bottle a little way into one of your nostrils. Gently drip or squirt a few drops into the nostril. Repeat with the other nostril. Some sneezing and gagging are normal at first.  · Gently blow your nose. · Wipe the syringe or bottle tip clean after each use. · Repeat this 2 or 3 times a day. · Use nasal washes gently if you have nosebleeds often. When should you call for help? Watch closely for changes in your health, and be sure to contact your doctor if:    · You often get nosebleeds.     · You have problems doing the nasal washes.    Where can you learn more? Go to http://roula-susan.info/. Enter [id number] [id number] in the search box to learn more about \"Saline Nasal Washes: Care Instructions. \"  Current as of: May 12, 2017  Content Version: 11.7  © 4349-4984 [employer]. Care instructions adapted under license by [employer] (which disclaims liability or warranty for this information). If you have questions about a medical condition or this instruction, always ask your healthcare professional. [address] any warranty or liability for your use of this information. Upper Respiratory Infection (Cold): Care Instructions  Your Care Instructions    An upper respiratory infection, or URI, is an infection of the nose, sinuses, or throat. URIs are spread by coughs, sneezes, and direct contact. The common cold is the most frequent kind of URI. The flu and sinus infections are other kinds of URIs. Almost all URIs are caused by viruses. Antibiotics won't cure them. But you can treat most infections with home care. This may include drinking lots of fluids and taking over-the-counter pain medicine. You will probably feel better in 4 to 10 days. The doctor has checked you carefully, but problems can develop later. If you notice any problems or new symptoms, get medical treatment right away. Follow-up care is a key part of your treatment and safety. Be sure to make and go to all appointments, and call your doctor if you are having problems. It's also a good idea to know your test results and keep a list of the medicines you take. How can you care for yourself at home? · To prevent dehydration, drink plenty of fluids, enough so that your urine is light yellow or clear like water. Choose water and other caffeine-free clear liquids until you feel better. If you have kidney, heart, or liver disease and have to limit fluids, talk with your doctor before you increase the amount of fluids you drink.   · Take an over-the-counter pain medicine, such as acetaminophen (Tylenol), ibuprofen (Advil, Motrin), or naproxen (Aleve). Read and follow all instructions on the label. · Before you use cough and cold medicines, check the label. These medicines may not be safe for young children or for people with certain health problems. · Be careful when taking over-the-counter cold or flu medicines and Tylenol at the same time. Many of these medicines have acetaminophen, which is Tylenol. Read the labels to make sure that you are not taking more than the recommended dose. Too much acetaminophen (Tylenol) can be harmful. · Get plenty of rest.  · Do not smoke or allow others to smoke around you. If you need help quitting, talk to your doctor about stop-smoking programs and medicines. These can increase your chances of quitting for good. When should you call for help? Call 911 anytime you think you may need emergency care. For example, call if:    · You have severe trouble breathing.    Call your doctor now or seek immediate medical care if:    · You seem to be getting much sicker.     · You have new or worse trouble breathing.     · You have a new or higher fever.     · You have a new rash.    Watch closely for changes in your health, and be sure to contact your doctor if:    · You have a new symptom, such as a sore throat, an earache, or sinus pain.     · You cough more deeply or more often, especially if you notice more mucus or a change in the color of your mucus.     · You do not get better as expected. Where can you learn more? Go to http://roula-susan.info/. Enter R299 in the search box to learn more about \"Upper Respiratory Infection (Cold): Care Instructions. \"  Current as of: December 6, 2017  Content Version: 11.7  © 8548-9886 [employer]. Care instructions adapted under license by [employer] (which disclaims liability or warranty for this information).  If you have questions about a medical condition or this instruction, always ask your healthcare professional. [address] any warranty or liability for your use of this information. Urinary Tract Infection in Women: Care Instructions  Your Care Instructions    A urinary tract infection, or UTI, is a general term for an infection anywhere between the kidneys and the urethra (where urine comes out). Most UTIs are bladder infections. They often cause pain or burning when you urinate. UTIs are caused by bacteria and can be cured with antibiotics. Be sure to complete your treatment so that the infection goes away. Follow-up care is a key part of your treatment and safety. Be sure to make and go to all appointments, and call your doctor if you are having problems. It's also a good idea to know your test results and keep a list of the medicines you take. How can you care for yourself at home? · Take your antibiotics as directed. Do not stop taking them just because you feel better. You need to take the full course of antibiotics. · Drink extra water and other fluids for the next day or two. This may help wash out the bacteria that are causing the infection. (If you have kidney, heart, or liver disease and have to limit fluids, talk with your doctor before you increase your fluid intake.)  · Avoid drinks that are carbonated or have caffeine. They can irritate the bladder. · Urinate often. Try to empty your bladder each time. · To relieve pain, take a hot bath or lay a heating pad set on low over your lower belly or genital area. Never go to sleep with a heating pad in place. To prevent UTIs  · Drink plenty of water each day. This helps you urinate often, which clears bacteria from your system. (If you have kidney, heart, or liver disease and have to limit fluids, talk with your doctor before you increase your fluid intake.)  · Urinate when you need to. · Urinate right after you have sex.   · Change sanitary pads often. · Avoid douches, bubble baths, feminine hygiene sprays, and other feminine hygiene products that have deodorants. · After going to the bathroom, wipe from front to back. When should you call for help? Call your doctor now or seek immediate medical care if:    · Symptoms such as fever, chills, nausea, or vomiting get worse or appear for the first time.     · You have new pain in your back just below your rib cage. This is called flank pain.     · There is new blood or pus in your urine.     · You have any problems with your antibiotic medicine.    Watch closely for changes in your health, and be sure to contact your doctor if:    · You are not getting better after taking an antibiotic for 2 days.     · Your symptoms go away but then come back. Where can you learn more? Go to http://roula-susan.info/. Enter I944 in the search box to learn more about \"Urinary Tract Infection in Women: Care Instructions. \"  Current as of: May 12, 2017  Content Version: 11.7  © 4084-7701 [employer]. Care instructions adapted under license by [employer] (which disclaims liability or warranty for this information). If you have questions about a medical condition or this instruction, always ask your healthcare professional. [address] any warranty or liability for your use of this information.

## 2018-08-23 LAB
BACTERIA SPEC CULT: ABNORMAL
CC UR VC: ABNORMAL
SERVICE CMNT-IMP: ABNORMAL

## 2019-02-12 ENCOUNTER — HOSPITAL ENCOUNTER (EMERGENCY)
Age: 34
Discharge: HOME | End: 2019-02-12
Attending: EMERGENCY MEDICINE
Payer: MEDICAID

## 2019-02-12 VITALS
SYSTOLIC BLOOD PRESSURE: 117 MMHG | TEMPERATURE: 98 F | WEIGHT: 144 LBS | HEART RATE: 79 BPM | HEIGHT: 67 IN | DIASTOLIC BLOOD PRESSURE: 88 MMHG | BODY MASS INDEX: 22.6 KG/M2 | RESPIRATION RATE: 17 BRPM | OXYGEN SATURATION: 100 %

## 2019-02-12 DIAGNOSIS — N93.9 ABNORMAL UTERINE BLEEDING (AUB): Primary | ICD-10-CM

## 2019-02-12 LAB
APPEARANCE UR: CLEAR
BACTERIA URNS QL MICRO: ABNORMAL /HPF
BILIRUB UR QL: NEGATIVE
CLUE CELLS VAG QL WET PREP: NORMAL
COLOR UR: ABNORMAL
EPITH CASTS URNS QL MICRO: ABNORMAL /LPF
GLUCOSE UR STRIP.AUTO-MCNC: NEGATIVE MG/DL
HCG UR QL: NEGATIVE
HGB UR QL STRIP: ABNORMAL
KETONES UR QL STRIP.AUTO: NEGATIVE MG/DL
KOH PREP SPEC: NORMAL
LEUKOCYTE ESTERASE UR QL STRIP.AUTO: NEGATIVE
NITRITE UR QL STRIP.AUTO: NEGATIVE
PH UR STRIP: 5.5 [PH] (ref 5–8)
PROT UR STRIP-MCNC: NEGATIVE MG/DL
RBC #/AREA URNS HPF: ABNORMAL /HPF (ref 0–5)
SERVICE CMNT-IMP: NORMAL
SP GR UR REFRACTOMETRY: 1.01 (ref 1–1.03)
T VAGINALIS VAG QL WET PREP: NORMAL
UA: UC IF INDICATED,UAUC: ABNORMAL
UROBILINOGEN UR QL STRIP.AUTO: 1 EU/DL (ref 0.2–1)
WBC URNS QL MICRO: ABNORMAL /HPF (ref 0–4)

## 2019-02-12 PROCEDURE — 87086 URINE CULTURE/COLONY COUNT: CPT

## 2019-02-12 PROCEDURE — 87210 SMEAR WET MOUNT SALINE/INK: CPT

## 2019-02-12 PROCEDURE — 81025 URINE PREGNANCY TEST: CPT

## 2019-02-12 PROCEDURE — 81001 URINALYSIS AUTO W/SCOPE: CPT

## 2019-02-12 PROCEDURE — 99284 EMERGENCY DEPT VISIT MOD MDM: CPT

## 2019-02-12 PROCEDURE — 87491 CHLMYD TRACH DNA AMP PROBE: CPT

## 2019-02-12 NOTE — ED NOTES
Emergency Department Nursing Plan of Care       The Nursing Plan of Care is developed from the Nursing assessment and Emergency Department Attending provider initial evaluation. The plan of care may be reviewed in the ED Provider note.     The Plan of Care was developed with the following considerations:   Patient / Family readiness to learn indicated by:verbalized understanding  Persons(s) to be included in education: patient  Barriers to Learning/Limitations:No    Signed     Monet Yeager RN    2/12/2019   1:54 PM

## 2019-02-12 NOTE — ED NOTES
Reporting to provider two episodes of vaginal bleeding this month. First episode starting on 21 lasting 7 days. Started bleeding again yesterday.

## 2019-02-12 NOTE — ED PROVIDER NOTES
EMERGENCY DEPARTMENT HISTORY AND PHYSICAL EXAM    Date: 2/12/2019  Patient Name: Yelena Saleh    History of Presenting Illness     Chief Complaint   Patient presents with    Vaginal Bleeding     pt c/o vaginal bleeding x 8 days. History Provided By: Patient     HPI: Yelena Saleh is a 28 y.o. female with a PMH of sickle cell trait, bipolar, depression who presents with cc of vaginal bleeding that started yesterday. Pt states she already had her menstrual cycle this month it was normal,she denies any concern for pregnancy or std. Denies any other vaginal discharge or urinary symptoms. Denies weight changes, stress or new ocp. Pt denies any abd painm  fevers, chills, nausea, vomiting, chest pain, shortness of breath, headache, rash, diarrhea, sweating or weight loss. All other ROS negative at this time  Pt is in no acute distress and is speaking in full sentences        PCP: Karena Palacios NP    Current Outpatient Medications   Medication Sig Dispense Refill    ondansetron (ZOFRAN ODT) 4 mg disintegrating tablet Take 1 Tab by mouth every eight (8) hours as needed for Nausea. 6 Tab 0    loratadine (CLARITIN REDITABS) 10 mg dissolvable tablet Take 1 Tab by mouth daily. 14 Tab 0    guaiFENesin-codeine (ROBITUSSIN AC) 100-10 mg/5 mL solution Take 10 mL by mouth three (3) times daily as needed for Cough. Max Daily Amount: 30 mL. 1 Bottle 0    FLUTICASONE PROPIONATE (FLONASE NA) by Nasal route.          Past History     Past Medical History:  Past Medical History:   Diagnosis Date    Abscess 12/1/2016    Anemia NEC     Bipolar 1 [facility] ([facility] [address]     Breast tenderness     bilateral, since 11/2016, on and off     Depression     Dizziness     Gastrointestinal disorder     pancreatitis    Increased frequency of headaches     Kidney stones 10/2012    Nausea     Nipple discharge 2009    white/clear discharge since birth of child     Other ill-defined conditions(799.89)     sickle cell trait    Other ill-defined conditions(799.89)     anemia    Psychiatric disorder     depression, bi-polar    Psychotic disorder (HCC)     bipolar 1    Sickle cell trait (HCC)     Sickle cell trait ([facility] [address]     Stomach pain        Past Surgical History:  Past Surgical History:   Procedure Laterality Date    HX GYN      tubaligation    HX GYN      ectopic pregnancy surgery    HX OTHER SURGICAL      hx of blood transfusions       Family History:  Family History   Problem Relation Age of Onset    Heart Disease Father        Social History:  Social History     Tobacco Use    Smoking status: Current Every Day Smoker     Packs/day: 0.25     Years: 9.00     Pack years: 2.25    Smokeless tobacco: Never Used   Substance Use Topics    Alcohol use: No    Drug use: No     Comment: occasional       Allergies: Allergies   Allergen Reactions    Latex Itching    Naproxen Vertigo    Zyrtec [Cetirizine] Vertigo         Review of Systems   Review of Systems   Constitutional: Negative. Negative for chills and fever. HENT: Negative. Eyes: Negative. Respiratory: Negative. Negative for shortness of breath. Cardiovascular: Negative. Negative for chest pain. Gastrointestinal: Negative. Negative for abdominal pain, diarrhea, nausea and vomiting. Endocrine: Negative. Genitourinary: Positive for vaginal bleeding. Negative for decreased urine volume, dysuria, enuresis, flank pain, vaginal discharge and vaginal pain. Musculoskeletal: Negative. Skin: Negative. Allergic/Immunologic: Negative. Neurological: Negative. Negative for headaches. Hematological: Negative. Psychiatric/Behavioral: Negative. All other systems reviewed and are negative. Physical Exam     Vitals:    02/12/19 1312   BP: 117/88   Pulse: 79   Resp: 17   Temp: 98 °F (36.7 °C)   SpO2: 100%   Weight: 65.3 kg (144 lb)   Height: 5' 7\" (1.702 m)     Physical Exam   Constitutional: She is oriented to person, place, and time. She appears well-developed and well-nourished. No distress. HENT:   Head: Normocephalic and atraumatic. Right Ear: External ear normal.   Left Ear: External ear normal.   Nose: Nose normal.   Mouth/Throat: Oropharynx is clear and moist. No oropharyngeal exudate. Eyes: Conjunctivae and EOM are normal. Pupils are equal, round, and reactive to light. Neck: Normal range of motion. Neck supple. No tracheal deviation present. Cardiovascular: Normal rate, regular rhythm, normal heart sounds and intact distal pulses. Pulmonary/Chest: Effort normal and breath sounds normal. No respiratory distress. She has no wheezes. Abdominal: Soft. Bowel sounds are normal. She exhibits no distension. There is no tenderness. There is no rebound, no guarding, no CVA tenderness, no tenderness at McBurney's point and negative Caldwell's sign. Genitourinary: Uterus normal. Cervix exhibits no motion tenderness, no discharge and no friability. Right adnexum displays no mass, no tenderness and no fullness. Left adnexum displays no mass, no tenderness and no fullness. There is bleeding (scant blood) in the vagina. No vaginal discharge found. Musculoskeletal: Normal range of motion. She exhibits no edema, tenderness or deformity. Lymphadenopathy:     She has no cervical adenopathy. Neurological: She is alert and oriented to person, place, and time. She has normal reflexes. She displays normal reflexes. No cranial nerve deficit. She exhibits normal muscle tone. Coordination normal.   Skin: Skin is warm and dry. She is not diaphoretic. No pallor. Psychiatric: She has a normal mood and affect. Her behavior is normal. Judgment and thought content normal.   Nursing note and vitals reviewed.         Diagnostic Study Results     Labs -     Recent Results (from the past 12 hour(s))   WET PREP    Collection Time: 02/12/19  2:46 PM   Result Value Ref Range    Clue cells CLUE CELLS ABSENT      Wet prep NO TRICHOMONAS SEEN     KOH, OTHER SOURCES    Collection Time: 02/12/19  2:46 PM   Result Value Ref Range    Special Requests: NO SPECIAL REQUESTS      KOH NO YEAST SEEN     URINALYSIS W/ REFLEX CULTURE    Collection Time: 02/12/19  3:26 PM   Result Value Ref Range    Color YELLOW/STRAW      Appearance CLEAR CLEAR      Specific gravity 1.015 1.003 - 1.030      pH (UA) 5.5 5.0 - 8.0      Protein NEGATIVE  NEG mg/dL    Glucose NEGATIVE  NEG mg/dL    Ketone NEGATIVE  NEG mg/dL    Bilirubin NEGATIVE  NEG      Blood LARGE (A) NEG      Urobilinogen 1.0 0.2 - 1.0 EU/dL    Nitrites NEGATIVE  NEG      Leukocyte Esterase NEGATIVE  NEG      WBC 0-4 0 - 4 /hpf    RBC 0-5 0 - 5 /hpf    Epithelial cells FEW FEW /lpf    Bacteria 1+ (A) NEG /hpf    UA:UC IF INDICATED URINE CULTURE ORDERED (A) CNI     HCG URINE, QL. - POC    Collection Time: 02/12/19  3:33 PM   Result Value Ref Range    Pregnancy test,urine (POC) NEGATIVE  NEG         Radiologic Studies -   No orders to display     CT Results  (Last 48 hours)    None        CXR Results  (Last 48 hours)    None            Medical Decision Making   I am the first provider for this patient. I reviewed the vital signs, available nursing notes, past medical history, past surgical history, family history and social history. Vital Signs-Reviewed the patient's vital signs. Records Reviewed: Nursing Notes, Old Medical Records, Previous Radiology Studies and Previous Laboratory Studies            Disposition:  Discharge     DISCHARGE NOTE:   Care plan outlined and precautions discussed. Patient has no new complaints, changes, or physical findings. Results of visit were reviewed with the patient. All medications were reviewed with the patient; will d/c home. All of pt's questions and concerns were addressed. Patient was instructed and agrees to follow up with pcp, as well as to return to the ED upon further deterioration. Patient is ready to go home.       Follow-up Information    None         Current Discharge Medication List          Provider Notes (Medical Decision Making):   DDX: dub, hormone changes, weight changes, stress, preg, miscarriage   Worsening si/sxs discussed extensively   Follow up with PCP or RTC if symptoms/signs worsen  Side effects of medication discussed  Education materials provided at discharge   Pt verbalizes agreement with plan    Procedures:  Procedures        Diagnosis     Clinical Impression:   1.  Abnormal uterine bleeding (AUB)

## 2019-02-12 NOTE — DISCHARGE INSTRUCTIONS
Patient Education        Abnormal Uterine Bleeding: Care Instructions  Your Care Instructions    Abnormal uterine bleeding (AUB) is irregular bleeding from the uterus that is longer or heavier than usual or does not occur at your regular time. Sometimes it is caused by changes in hormone levels. It can also be caused by growths in the uterus, such as fibroids or polyps. Sometimes a cause cannot be found. You may have heavy bleeding when you are not expecting your period. Your doctor may suggest a pregnancy test, if you think you are pregnant. Follow-up care is a key part of your treatment and safety. Be sure to make and go to all appointments, and call your doctor if you are having problems. It's also a good idea to know your test results and keep a list of the medicines you take. How can you care for yourself at home? · Be safe with medicines. Take pain medicines exactly as directed. ? If the doctor gave you a prescription medicine for pain, take it as prescribed. ? If you are not taking a prescription pain medicine, ask your doctor if you can take an over-the-counter medicine. · You may be low in iron because of blood loss. Eat a balanced diet that is high in iron and vitamin C. Foods rich in iron include red meat, shellfish, eggs, beans, and leafy green vegetables. Talk to your doctor about whether you need to take iron pills or a multivitamin. When should you call for help? Call 911 anytime you think you may need emergency care. For example, call if:    · You passed out (lost consciousness).    Call your doctor now or seek immediate medical care if:    · You have new or worse belly or pelvic pain.     · You have severe vaginal bleeding.     · You feel dizzy or lightheaded, or you feel like you may faint.    Watch closely for changes in your health, and be sure to contact your doctor if:    · You think you may be pregnant.     · Your bleeding gets worse.     · You do not get better as expected.    Where can you learn more? Go to http://roula-susan.info/. Enter W689 in the search box to learn more about \"Abnormal Uterine Bleeding: Care Instructions. \"  Current as of: May 14, 2018  Content Version: 11.9  © 2017-9856 [employer]. Care instructions adapted under license by [employer] (which disclaims liability or warranty for this information). If you have questions about a medical condition or this instruction, always ask your healthcare professional. [place] [address] any warranty or liability for your use of this information.

## 2019-02-12 NOTE — LETTER
2/14/2019 1324 [facility] [address] [place] [id number] Dear Ms. Wei You were seen in the Emergency Department of [address] [place] [address] on 2/12/19 and had lab and/or radiology tests performed. The Urine culture from your Emergency Department visit on 2/12/19 was positive. If you have not improved or are worsening, please follow up with your primary care doctor or Emergency department as soon as possible. Please follow up with you [facility] care doctor or health department. If you have any questions please contact the Emergency Department at [phone]. Sincerely, ROBERT Mckeon. [address] 
[facility] EMERGENCY DEPT 
[address] [place] [id number] [phone]

## 2019-02-14 LAB
BACTERIA SPEC CULT: ABNORMAL
CC UR VC: ABNORMAL
SERVICE CMNT-IMP: ABNORMAL

## 2019-07-02 ENCOUNTER — HOSPITAL ENCOUNTER (EMERGENCY)
Age: 34
Discharge: HOME | End: 2019-07-02
Attending: EMERGENCY MEDICINE
Payer: MEDICAID

## 2019-07-02 ENCOUNTER — APPOINTMENT (OUTPATIENT)
Dept: GENERAL RADIOLOGY | Age: 34
End: 2019-07-02
Attending: PHYSICIAN ASSISTANT
Payer: MEDICAID

## 2019-07-02 VITALS
BODY MASS INDEX: 23.46 KG/M2 | DIASTOLIC BLOOD PRESSURE: 69 MMHG | TEMPERATURE: 97.8 F | RESPIRATION RATE: 18 BRPM | WEIGHT: 149.5 LBS | HEART RATE: 92 BPM | HEIGHT: 67 IN | SYSTOLIC BLOOD PRESSURE: 121 MMHG | OXYGEN SATURATION: 98 %

## 2019-07-02 DIAGNOSIS — N30.01 ACUTE CYSTITIS WITH HEMATURIA: Primary | ICD-10-CM

## 2019-07-02 LAB
ANION GAP SERPL CALC-SCNC: 9 MMOL/L (ref 5–15)
APPEARANCE UR: ABNORMAL
BACTERIA URNS QL MICRO: ABNORMAL /HPF
BASOPHILS # BLD: 0.1 K/UL (ref 0–0.1)
BASOPHILS NFR BLD: 1 % (ref 0–1)
BILIRUB UR QL CFM: NEGATIVE
BUN SERPL-MCNC: 11 MG/DL (ref 6–20)
BUN/CREAT SERPL: 16 (ref 12–20)
CALCIUM SERPL-MCNC: 8.6 MG/DL (ref 8.5–10.1)
CHLORIDE SERPL-SCNC: 106 MMOL/L (ref 97–108)
CO2 SERPL-SCNC: 28 MMOL/L (ref 21–32)
COLOR UR: ABNORMAL
CREAT SERPL-MCNC: 0.67 MG/DL (ref 0.55–1.02)
DIFFERENTIAL METHOD BLD: ABNORMAL
EOSINOPHIL # BLD: 0.4 K/UL (ref 0–0.4)
EOSINOPHIL NFR BLD: 7 % (ref 0–7)
EPITH CASTS URNS QL MICRO: ABNORMAL /LPF
ERYTHROCYTE [DISTWIDTH] IN BLOOD BY AUTOMATED COUNT: 14.1 % (ref 11.5–14.5)
GLUCOSE SERPL-MCNC: 80 MG/DL (ref 65–100)
GLUCOSE UR STRIP.AUTO-MCNC: NEGATIVE MG/DL
HCG UR QL: NEGATIVE
HCT VFR BLD AUTO: 31.9 % (ref 35–47)
HGB BLD-MCNC: 10.9 G/DL (ref 11.5–16)
HGB UR QL STRIP: ABNORMAL
IMM GRANULOCYTES # BLD AUTO: 0 K/UL (ref 0–0.04)
IMM GRANULOCYTES NFR BLD AUTO: 0 % (ref 0–0.5)
KETONES UR QL STRIP.AUTO: ABNORMAL MG/DL
LEUKOCYTE ESTERASE UR QL STRIP.AUTO: ABNORMAL
LIPASE SERPL-CCNC: 135 U/L (ref 73–393)
LYMPHOCYTES # BLD: 2.2 K/UL (ref 0.8–3.5)
LYMPHOCYTES NFR BLD: 38 % (ref 12–49)
MCH RBC QN AUTO: 29 PG (ref 26–34)
MCHC RBC AUTO-ENTMCNC: 34.2 G/DL (ref 30–36.5)
MCV RBC AUTO: 84.8 FL (ref 80–99)
MONOCYTES # BLD: 0.5 K/UL (ref 0–1)
MONOCYTES NFR BLD: 8 % (ref 5–13)
NEUTS SEG # BLD: 2.5 K/UL (ref 1.8–8)
NEUTS SEG NFR BLD: 46 % (ref 32–75)
NITRITE UR QL STRIP.AUTO: NEGATIVE
NRBC # BLD: 0 K/UL (ref 0–0.01)
NRBC BLD-RTO: 0 PER 100 WBC
PH UR STRIP: 5.5 [PH] (ref 5–8)
PLATELET # BLD AUTO: 195 K/UL (ref 150–400)
PMV BLD AUTO: 12.1 FL (ref 8.9–12.9)
POTASSIUM SERPL-SCNC: 3.6 MMOL/L (ref 3.5–5.1)
PROT UR STRIP-MCNC: 100 MG/DL
RBC # BLD AUTO: 3.76 M/UL (ref 3.8–5.2)
RBC #/AREA URNS HPF: >100 /HPF (ref 0–5)
SODIUM SERPL-SCNC: 143 MMOL/L (ref 136–145)
SP GR UR REFRACTOMETRY: 1.01 (ref 1–1.03)
UA: UC IF INDICATED,UAUC: ABNORMAL
UROBILINOGEN UR QL STRIP.AUTO: 1 EU/DL (ref 0.2–1)
WBC # BLD AUTO: 5.7 K/UL (ref 3.6–11)
WBC URNS QL MICRO: ABNORMAL /HPF (ref 0–4)

## 2019-07-02 PROCEDURE — 81001 URINALYSIS AUTO W/SCOPE: CPT

## 2019-07-02 PROCEDURE — 87086 URINE CULTURE/COLONY COUNT: CPT

## 2019-07-02 PROCEDURE — 74011250637 HC RX REV CODE- 250/637: Performed by: PHYSICIAN ASSISTANT

## 2019-07-02 PROCEDURE — 83690 ASSAY OF LIPASE: CPT

## 2019-07-02 PROCEDURE — 81025 URINE PREGNANCY TEST: CPT

## 2019-07-02 PROCEDURE — 99283 EMERGENCY DEPT VISIT LOW MDM: CPT

## 2019-07-02 PROCEDURE — 36415 COLL VENOUS BLD VENIPUNCTURE: CPT

## 2019-07-02 PROCEDURE — 74018 RADEX ABDOMEN 1 VIEW: CPT

## 2019-07-02 PROCEDURE — 80048 BASIC METABOLIC PNL TOTAL CA: CPT

## 2019-07-02 PROCEDURE — 85025 COMPLETE CBC W/AUTO DIFF WBC: CPT

## 2019-07-02 RX ORDER — SODIUM CHLORIDE 0.9 % (FLUSH) 0.9 %
5-40 SYRINGE (ML) INJECTION EVERY 8 HOURS
Status: DISCONTINUED | OUTPATIENT
Start: 2019-07-02 | End: 2019-07-03 | Stop reason: HOSPADM

## 2019-07-02 RX ORDER — SODIUM CHLORIDE 0.9 % (FLUSH) 0.9 %
5-40 SYRINGE (ML) INJECTION AS NEEDED
Status: DISCONTINUED | OUTPATIENT
Start: 2019-07-02 | End: 2019-07-03 | Stop reason: HOSPADM

## 2019-07-02 RX ORDER — CEPHALEXIN 500 MG/1
500 CAPSULE ORAL 2 TIMES DAILY
Qty: 14 CAP | Refills: 0 | Status: SHIPPED | OUTPATIENT
Start: 2019-07-02 | End: 2019-07-09 | Stop reason: ALTCHOICE

## 2019-07-02 RX ORDER — CEPHALEXIN 250 MG/1
500 CAPSULE ORAL
Status: COMPLETED | OUTPATIENT
Start: 2019-07-02 | End: 2019-07-02

## 2019-07-02 RX ADMIN — CEPHALEXIN 500 MG: 250 CAPSULE ORAL at 23:13

## 2019-07-03 NOTE — DISCHARGE INSTRUCTIONS

## 2019-07-03 NOTE — ED TRIAGE NOTES
Pt with c/o hematuria with pelvic and back pain that started Monday night. Pt states that she has been drinking water and cranberry juice which clears the urine, but the next day the blood comes right back.

## 2019-07-03 NOTE — ED NOTES
Discharge instructions were given to the patient by Sherri Castellano RN  . The patient left the Emergency Department ambulatory, alert and oriented and in no acute distress with one prescriptions. The patient was encouraged to call or return to the ED for worsening issues or problems and was encouraged to schedule a follow up appointment for continuing care. The patient verbalized understanding of discharge instructions and prescriptions, all questions were answered. The patient has no further concerns at this time.

## 2019-07-03 NOTE — ED PROVIDER NOTES
EMERGENCY DEPARTMENT HISTORY AND PHYSICAL EXAM      Date: 7/2/2019  Patient Name: Silverio Dent    History of Presenting Illness     Chief Complaint   Patient presents with    Blood in Urine    Pelvic Pain       History Provided By: Patient    HPI: Silverio Dent, 28 y.o. female with PMHx significant for tubal ligation, ectopic pregnancy, blood transfusions, tobacco abuse, bipolar disorder, depression, anemia, kidney stones, sickle cell trait, pancreatitis, presents ambulatory to the ED with cc of acute moderate hematuria with right bilateral low pelvic pain, back pain X 2 days. No medications or modifying factors. Denies vaginal discharge, vaginal bleeding, diarrhea, constipation, fever, chills, nausea, vomiting, dysuria, frequency, urgency, lightheadedness, dizziness, syncope, headache. There are no other complaints, changes, or physical findings at this time. PCP: Vanessa Ann NP    No current facility-administered medications on file prior to encounter. No current outpatient medications on file prior to encounter.        Past History     Past Medical History:  Past Medical History:   Diagnosis Date    Abscess 12/1/2016    Anemia NEC     Bipolar 1 disorder ([address]     Breast tenderness     bilateral, since 11/2016, on and off     Depression     Dizziness     Gastrointestinal disorder     pancreatitis    Increased frequency of headaches     Kidney stones 10/2012    Nausea     Nipple discharge 2009    white/clear discharge since birth of child     Other ill-defined conditions(799.89)     sickle cell trait    Other ill-defined conditions(799.89)     anemia    Psychiatric disorder     depression, bi-polar    Psychotic disorder ([address]     bipolar 1    Sickle cell trait ([address]     Sickle cell trait ([address]     Stomach pain        Past Surgical History:  Past Surgical History:   Procedure Laterality Date    HX GYN      tubaligation    HX GYN      ectopic pregnancy surgery    HX OTHER SURGICAL      hx of blood transfusions       Family History:  Family History   Problem Relation Age of Onset    Heart Disease Father        Social History:  Social History     Tobacco Use    Smoking status: Current Every Day Smoker     Packs/day: 0.25     Years: 9.00     Pack years: 2.25    Smokeless tobacco: Never Used   Substance Use Topics    Alcohol use: No    Drug use: No     Types: Marijuana     Comment: occasional       Allergies: Allergies   Allergen Reactions    Latex Itching    Naproxen Vertigo    Zyrtec [Cetirizine] Vertigo         Review of Systems   Review of Systems   Constitutional: Negative. Negative for activity change, appetite change, chills and fever. HENT: Negative. Negative for congestion, ear pain, postnasal drip and sore throat. Eyes: Negative. Negative for pain and visual disturbance. Respiratory: Negative. Negative for cough and shortness of breath. Cardiovascular: Negative. Negative for chest pain. Gastrointestinal: Positive for abdominal pain. Negative for anal bleeding, diarrhea, nausea, rectal pain and vomiting. Genitourinary: Positive for hematuria. Negative for difficulty urinating, dysuria, flank pain, frequency, menstrual problem, pelvic pain, urgency, vaginal bleeding, vaginal discharge and vaginal pain. Musculoskeletal: Negative. Negative for joint swelling. Skin: Negative. Negative for rash. Neurological: Negative. Negative for dizziness, light-headedness and headaches. Psychiatric/Behavioral: Negative. Physical Exam   Physical Exam   Constitutional: She is oriented to person, place, and time. She appears well-developed and well-nourished. No distress. HENT:   Head: Normocephalic and atraumatic. Right Ear: Hearing and external ear normal.   Left Ear: Hearing and external ear normal.   Nose: Nose normal.   Mouth/Throat: Oropharynx is clear and moist. No oropharyngeal exudate.    Eyes: Pupils are equal, round, and reactive to light. Conjunctivae and EOM are normal.   Neck: Normal range of motion. Cardiovascular: Normal rate, regular rhythm, normal heart sounds and intact distal pulses. Exam reveals no gallop and no friction rub. No murmur heard. Pulmonary/Chest: Effort normal and breath sounds normal. No respiratory distress. She has no wheezes. She has no rales. Abdominal: Soft. Bowel sounds are normal. There is tenderness in the suprapubic area. There is no rigidity, no rebound, no guarding, no CVA tenderness, no tenderness at McBurney's point and negative Caldwell's sign. Musculoskeletal: Normal range of motion. Neurological: She is alert and oriented to person, place, and time. Skin: Skin is warm and dry. She is not diaphoretic. Psychiatric: She has a normal mood and affect. Her behavior is normal. Judgment and thought content normal.   Nursing note and vitals reviewed.       Diagnostic Study Results     Labs -     Recent Results (from the past 12 hour(s))   URINALYSIS W/ REFLEX CULTURE    Collection Time: 07/02/19 10:20 PM   Result Value Ref Range    Color JULY      Appearance TURBID (A) CLEAR      Specific gravity 1.015 1.003 - 1.030      pH (UA) 5.5 5.0 - 8.0      Protein 100 (A) NEG mg/dL    Glucose NEGATIVE  NEG mg/dL    Ketone TRACE (A) NEG mg/dL    Blood LARGE (A) NEG      Urobilinogen 1.0 0.2 - 1.0 EU/dL    Nitrites NEGATIVE  NEG      Leukocyte Esterase MODERATE (A) NEG      WBC 5-10 0 - 4 /hpf    RBC >100 (H) 0 - 5 /hpf    Epithelial cells FEW FEW /lpf    Bacteria 3+ (A) NEG /hpf    UA:UC IF INDICATED URINE CULTURE ORDERED (A) CNI     HCG URINE, QL. - POC    Collection Time: 07/02/19 10:20 PM   Result Value Ref Range    Pregnancy test,urine (POC) NEGATIVE  NEG     BILIRUBIN, CONFIRM    Collection Time: 07/02/19 10:20 PM   Result Value Ref Range    Bilirubin UA, confirm NEGATIVE  NEG     CBC WITH AUTOMATED DIFF    Collection Time: 07/02/19 10:28 PM   Result Value Ref Range    WBC 5.7 3.6 - 11.0 K/uL    RBC 3.76 (L) 3.80 - 5.20 M/uL    HGB 10.9 (L) 11.5 - 16.0 g/dL    HCT 31.9 (L) 35.0 - 47.0 %    MCV 84.8 80.0 - 99.0 FL    MCH 29.0 26.0 - 34.0 PG    MCHC 34.2 30.0 - 36.5 g/dL    RDW 14.1 11.5 - 14.5 %    PLATELET 842 014 - 464 K/uL    MPV 12.1 8.9 - 12.9 FL    NRBC 0.0 0  WBC    ABSOLUTE NRBC 0.00 0.00 - 0.01 K/uL    NEUTROPHILS 46 32 - 75 %    LYMPHOCYTES 38 12 - 49 %    MONOCYTES 8 5 - 13 %    EOSINOPHILS 7 0 - 7 %    BASOPHILS 1 0 - 1 %    IMMATURE GRANULOCYTES 0 0.0 - 0.5 %    ABS. NEUTROPHILS 2.5 1.8 - 8.0 K/UL    ABS. LYMPHOCYTES 2.2 0.8 - 3.5 K/UL    ABS. MONOCYTES 0.5 0.0 - 1.0 K/UL    ABS. EOSINOPHILS 0.4 0.0 - 0.4 K/UL    ABS. BASOPHILS 0.1 0.0 - 0.1 K/UL    ABS. IMM. GRANS. 0.0 0.00 - 0.04 K/UL    DF AUTOMATED     METABOLIC PANEL, BASIC    Collection Time: 07/02/19 10:28 PM   Result Value Ref Range    Sodium 143 136 - 145 mmol/L    Potassium 3.6 3.5 - 5.1 mmol/L    Chloride 106 97 - 108 mmol/L    CO2 28 21 - 32 mmol/L    Anion gap 9 5 - 15 mmol/L    Glucose 80 65 - 100 mg/dL    BUN 11 6 - 20 MG/DL    Creatinine 0.67 0.55 - 1.02 MG/DL    BUN/Creatinine ratio 16 12 - 20      GFR est AA >60 >60 ml/min/1.73m2    GFR est non-AA >60 >60 ml/min/1.73m2    Calcium 8.6 8.5 - 10.1 MG/DL   LIPASE    Collection Time: 07/02/19 10:28 PM   Result Value Ref Range    Lipase 135 73 - 393 U/L       Radiologic Studies -   XR ABD (KUB)   Final Result   IMPRESSION: No radiographic evidence for  calculus. Moderate amount of colonic   stool without evidence for bowel obstruction. CT Results  (Last 48 hours)    None        CXR Results  (Last 48 hours)    None            Medical Decision Making   I am the first provider for this patient. I reviewed the vital signs, available nursing notes, past medical history, past surgical history, family history and social history. Vital Signs-Reviewed the patient's vital signs.   Patient Vitals for the past 12 hrs:   Temp Pulse Resp BP SpO2   07/02/19 2202 97.8 °F (36.6 °C) 92 18 121/69 98 %       Pulse Oximetry Analysis - 98% on RA    Records Reviewed: Nursing Notes, Old Medical Records, Previous Radiology Studies and Previous Laboratory Studies    Provider Notes (Medical Decision Making):   Patient presents for gross hematuria. DDx: UTI, pyelonephritis, ureteral stone, renal/bladder CA. Will get UA to start and consider CT abd/pelvis PRN      ED Course:   Initial assessment performed. The patients presenting problems have been discussed, and they are in agreement with the care plan formulated and outlined with them. I have encouraged them to ask questions as they arise throughout their visit. Critical Care Time:   0    Disposition:  11:04 PM  I have discussed with patient their diagnosis, treatment, and follow up plan. The patient agrees to follow up as outlined in discharge paperwork and also to return to the ED with any worsening. Bri Morales PA-C        PLAN:  1. Current Discharge Medication List      START taking these medications    Details   cephALEXin (KEFLEX) 500 mg capsule Take 1 Cap by mouth two (2) times a day for 7 days. Qty: 14 Cap, Refills: 0           2. Follow-up Information     Follow up With Specialties Details Why Contact Info    Vanessa Ann NP Nurse Practitioner Schedule an appointment as soon as possible for a visit in 3 days As needed, If symptoms worsen [place]  [address] [place]  [phone]          Return to ED if worse     Diagnosis     Clinical Impression:   1. Acute cystitis with hematuria        Attestations:    Please note that this dictation was completed with Dragon, computer voice recognition software. Quite often unanticipated grammatical, syntax, homophones, and other interpretive errors are inadvertently transcribed by the computer software. Please disregard these errors. Additionally, please excuse any errors that have escaped final proofreading.

## 2019-07-03 NOTE — ED NOTES
Pt presents to ED ambulatory complaining of hematuria , frequency, urinary ddiscomfort and pelvic pain for a day. Pt is alert and oriented x 4, RR even and unlabored, skin is warm and dry. Assessment completed and pt updated on plan of care. Emergency Department Nursing Plan of Care       The Nursing Plan of Care is developed from the Nursing assessment and Emergency Department Attending provider initial evaluation. The plan of care may be reviewed in the ED Provider note.     The Plan of Care was developed with the following considerations:   Patient / Family readiness to learn indicated by:verbalized understanding  Persons(s) to be included in education: patient  Barriers to Learning/Limitations:No    Signed     Rochelle Apgar, RN    2019   11:18 PM

## 2019-07-04 LAB
BACTERIA SPEC CULT: NORMAL
CC UR VC: NORMAL
SERVICE CMNT-IMP: NORMAL

## 2019-07-09 ENCOUNTER — OFFICE VISIT (OUTPATIENT)
Dept: INTERNAL MEDICINE CLINIC | Age: 34
End: 2019-07-09

## 2019-07-09 ENCOUNTER — HOSPITAL ENCOUNTER (EMERGENCY)
Age: 34
Discharge: HOME | End: 2019-07-10
Attending: EMERGENCY MEDICINE
Payer: MEDICAID

## 2019-07-09 ENCOUNTER — APPOINTMENT (OUTPATIENT)
Dept: CT IMAGING | Age: 34
End: 2019-07-09
Attending: PHYSICIAN ASSISTANT
Payer: MEDICAID

## 2019-07-09 VITALS
BODY MASS INDEX: 23.35 KG/M2 | RESPIRATION RATE: 17 BRPM | SYSTOLIC BLOOD PRESSURE: 116 MMHG | TEMPERATURE: 97.9 F | HEART RATE: 88 BPM | OXYGEN SATURATION: 99 % | WEIGHT: 148.8 LBS | DIASTOLIC BLOOD PRESSURE: 77 MMHG | HEIGHT: 67 IN

## 2019-07-09 DIAGNOSIS — N83.202 CYSTS OF BOTH OVARIES: ICD-10-CM

## 2019-07-09 DIAGNOSIS — N83.201 CYSTS OF BOTH OVARIES: ICD-10-CM

## 2019-07-09 DIAGNOSIS — Z87.59 HISTORY OF ECTOPIC PREGNANCY: ICD-10-CM

## 2019-07-09 DIAGNOSIS — Z98.51 HISTORY OF TUBAL LIGATION: ICD-10-CM

## 2019-07-09 DIAGNOSIS — R31.0 GROSS HEMATURIA: Primary | ICD-10-CM

## 2019-07-09 LAB
ALBUMIN SERPL-MCNC: 3.9 G/DL (ref 3.5–5)
ALBUMIN/GLOB SERPL: 1.1 {RATIO} (ref 1.1–2.2)
ALP SERPL-CCNC: 67 U/L (ref 45–117)
ALT SERPL-CCNC: 14 U/L (ref 12–78)
ANION GAP SERPL CALC-SCNC: 7 MMOL/L (ref 5–15)
APPEARANCE UR: ABNORMAL
AST SERPL-CCNC: 11 U/L (ref 15–37)
BACTERIA URNS QL MICRO: NEGATIVE /HPF
BASOPHILS # BLD: 0.1 K/UL (ref 0–0.1)
BASOPHILS NFR BLD: 1 % (ref 0–1)
BILIRUB SERPL-MCNC: 0.7 MG/DL (ref 0.2–1)
BILIRUB UR QL STRIP: ABNORMAL
BILIRUB UR QL: NEGATIVE
BUN SERPL-MCNC: 11 MG/DL (ref 6–20)
BUN/CREAT SERPL: 15 (ref 12–20)
CALCIUM SERPL-MCNC: 9.1 MG/DL (ref 8.5–10.1)
CHLORIDE SERPL-SCNC: 107 MMOL/L (ref 97–108)
CO2 SERPL-SCNC: 26 MMOL/L (ref 21–32)
COLOR UR: ABNORMAL
COMMENT, HOLDF: NORMAL
CREAT SERPL-MCNC: 0.71 MG/DL (ref 0.55–1.02)
DIFFERENTIAL METHOD BLD: ABNORMAL
EOSINOPHIL # BLD: 0.3 K/UL (ref 0–0.4)
EOSINOPHIL NFR BLD: 5 % (ref 0–7)
EPITH CASTS URNS QL MICRO: ABNORMAL /LPF
ERYTHROCYTE [DISTWIDTH] IN BLOOD BY AUTOMATED COUNT: 14.1 % (ref 11.5–14.5)
GLOBULIN SER CALC-MCNC: 3.5 G/DL (ref 2–4)
GLUCOSE SERPL-MCNC: 96 MG/DL (ref 65–100)
GLUCOSE UR STRIP.AUTO-MCNC: NEGATIVE MG/DL
GLUCOSE UR-MCNC: ABNORMAL MG/DL
HCG SERPL-ACNC: <1 MIU/ML (ref 0–6)
HCG UR QL: NEGATIVE
HCG URINE, QL. (POC): NEGATIVE
HCT VFR BLD AUTO: 32.2 % (ref 35–47)
HGB BLD-MCNC: 11 G/DL (ref 11.5–16)
HGB UR QL STRIP: ABNORMAL
IMM GRANULOCYTES # BLD AUTO: 0 K/UL (ref 0–0.04)
IMM GRANULOCYTES NFR BLD AUTO: 0 % (ref 0–0.5)
KETONES P FAST UR STRIP-MCNC: ABNORMAL MG/DL
KETONES UR QL STRIP.AUTO: 15 MG/DL
LEUKOCYTE ESTERASE UR QL STRIP.AUTO: ABNORMAL
LYMPHOCYTES # BLD: 2.4 K/UL (ref 0.8–3.5)
LYMPHOCYTES NFR BLD: 47 % (ref 12–49)
MCH RBC QN AUTO: 28.6 PG (ref 26–34)
MCHC RBC AUTO-ENTMCNC: 34.2 G/DL (ref 30–36.5)
MCV RBC AUTO: 83.9 FL (ref 80–99)
MONOCYTES # BLD: 0.3 K/UL (ref 0–1)
MONOCYTES NFR BLD: 7 % (ref 5–13)
NEUTS SEG # BLD: 2.1 K/UL (ref 1.8–8)
NEUTS SEG NFR BLD: 40 % (ref 32–75)
NITRITE UR QL STRIP.AUTO: NEGATIVE
NRBC # BLD: 0 K/UL (ref 0–0.01)
NRBC BLD-RTO: 0 PER 100 WBC
PH UR STRIP: 6 [PH] (ref 5–8)
PH UR STRIP: 8.5 [PH] (ref 4.6–8)
PLATELET # BLD AUTO: 262 K/UL (ref 150–400)
PMV BLD AUTO: 11.9 FL (ref 8.9–12.9)
POTASSIUM SERPL-SCNC: 3.2 MMOL/L (ref 3.5–5.1)
PROT SERPL-MCNC: 7.4 G/DL (ref 6.4–8.2)
PROT UR QL STRIP: ABNORMAL
PROT UR STRIP-MCNC: >300 MG/DL
RBC # BLD AUTO: 3.84 M/UL (ref 3.8–5.2)
RBC #/AREA URNS HPF: >100 /HPF (ref 0–5)
SAMPLES BEING HELD,HOLD: NORMAL
SODIUM SERPL-SCNC: 140 MMOL/L (ref 136–145)
SP GR UR REFRACTOMETRY: 1.02 (ref 1–1.03)
SP GR UR STRIP: 1 (ref 1–1.03)
TROPONIN I SERPL-MCNC: <0.05 NG/ML
UA UROBILINOGEN AMB POC: ABNORMAL (ref 0.2–1)
URINALYSIS CLARITY POC: ABNORMAL
URINALYSIS COLOR POC: ABNORMAL
URINE BLOOD POC: ABNORMAL
URINE LEUKOCYTES POC: ABNORMAL
URINE NITRITES POC: NEGATIVE
UROBILINOGEN UR QL STRIP.AUTO: 1 EU/DL (ref 0.2–1)
VALID INTERNAL CONTROL?: YES
WBC # BLD AUTO: 5.1 K/UL (ref 3.6–11)
WBC URNS QL MICRO: ABNORMAL /HPF (ref 0–4)

## 2019-07-09 PROCEDURE — 74176 CT ABD & PELVIS W/O CONTRAST: CPT

## 2019-07-09 PROCEDURE — 87086 URINE CULTURE/COLONY COUNT: CPT

## 2019-07-09 PROCEDURE — 99284 EMERGENCY DEPT VISIT MOD MDM: CPT

## 2019-07-09 PROCEDURE — 84702 CHORIONIC GONADOTROPIN TEST: CPT

## 2019-07-09 PROCEDURE — 81025 URINE PREGNANCY TEST: CPT

## 2019-07-09 PROCEDURE — 80053 COMPREHEN METABOLIC PANEL: CPT

## 2019-07-09 PROCEDURE — 93005 ELECTROCARDIOGRAM TRACING: CPT

## 2019-07-09 PROCEDURE — 85025 COMPLETE CBC W/AUTO DIFF WBC: CPT

## 2019-07-09 PROCEDURE — 81001 URINALYSIS AUTO W/SCOPE: CPT

## 2019-07-09 PROCEDURE — 84484 ASSAY OF TROPONIN QUANT: CPT

## 2019-07-09 PROCEDURE — 36415 COLL VENOUS BLD VENIPUNCTURE: CPT

## 2019-07-09 RX ORDER — NITROFURANTOIN 25; 75 MG/1; MG/1
100 CAPSULE ORAL 2 TIMES DAILY
Qty: 14 CAP | Refills: 0 | Status: SHIPPED | OUTPATIENT
Start: 2019-07-09 | End: 2019-07-16

## 2019-07-09 NOTE — PROGRESS NOTES
Cristina Martinez is a 28 y.o. female and presents with Urinary Pain (pt states with blood in urine)    Subjective:  Pt here today concerned urinating blood clots today. Seen 7/2 in ER with diagnosis of UTI. Treated with Keflex, but reports urine color changes from orange to yellow to bloody today. No h/o renal colic reported. Denies flank pain, but reports vaginal pain and lower abdominal pain. Denies vomiting and diarrhea, but has nausea. Patient's last menstrual period was 06/11/2019 (approximate). Associated with urinary incontinence, urgency, and frequency.      Review of Systems  Constitutional: negative for fevers, chills, anorexia and weight loss  Respiratory:  negative for cough, hemoptysis, dyspnea, and wheezing  CV:   negative for chest pain, palpitations, and lower extremity edema  GI:   negative for vomiting, diarrhea,and melena  Endo:               negative for polyuria,polydipsia,polyphagia, and heat intolerance  Genitourinary: negative for dysuria & retention  Integument:  negative for rash, ulcerations, and pruritus  Hematologic:  negative for easy bruising and bleeding  Musculoskel: negative for arthralgias, muscle weakness,and joint pain/swelling  Neurological:  negative for headaches, dizziness, vertigo,and memory/gait problems  Behavl/Psych: negative for feelings of anxiety, depression, suicide, and mood changes    Past Medical History:   Diagnosis Date    Abscess 12/1/2016    Anemia NEC     Bipolar 1 disorder ([address]     Breast tenderness     bilateral, since 11/2016, on and off     Depression     Dizziness     Gastrointestinal disorder     pancreatitis    Increased frequency of headaches     Kidney stones 10/2012    Nausea     Nipple discharge 2009    white/clear discharge since birth of child     Other ill-defined conditions(799.89)     sickle cell trait    Other ill-defined conditions(799.89)     anemia    Psychiatric disorder     depression, bi-polar    Psychotic disorder [facility]     bipolar 1    Sickle cell trait ([facility]     Sickle cell trait ([facility]     Stomach pain      Past Surgical History:   Procedure Laterality Date    HX GYN      tubaligation    HX GYN      ectopic pregnancy surgery    HX OTHER SURGICAL      hx of blood transfusions     Social History     Socioeconomic History    Marital status: SINGLE     Spouse name: Not on file    Number of children: Not on file    Years of education: Not on file    Highest education level: Not on file   Tobacco Use    Smoking status: Current Every Day Smoker     Packs/day: 0.25     Years: 9.00     Pack years: 2.25    Smokeless tobacco: Never Used   Substance and Sexual Activity    Alcohol use: No    Drug use: No     Types: Marijuana     Comment: occasional    Sexual activity: Yes     Partners: Male     Birth control/protection: None     Family History   Problem Relation Age of Onset    Heart Disease Father      Current Outpatient Medications   Medication Sig Dispense Refill    nitrofurantoin, macrocrystal-monohydrate, (MACROBID) 100 mg capsule Take 1 Cap by mouth two (2) times a day for 7 days. 14 Cap 0     Allergies   Allergen Reactions    Latex Itching    Naproxen Vertigo    Zyrtec [Cetirizine] Vertigo       Objective:  Visit Vitals  /77 (BP 1 Location: Right arm, BP Patient Position: Sitting)   Pulse 88   Temp 97.9 °F (36.6 °C) (Oral)   Resp 17   Ht 5' 7\" (1.702 m)   Wt 148 lb 12.8 oz (67.5 kg)   LMP 06/11/2019 (Approximate)   SpO2 99%   BMI 23.31 kg/m²     Wt Readings from Last 3 Encounters:   07/09/19 148 lb 12.8 oz (67.5 kg)   07/02/19 149 lb 8 oz (67.8 kg)   02/12/19 144 lb (65.3 kg)     Physical Exam:   General appearance - alert, well appearing, and in no distress. Mental status - A/O x 4, aloof mood and affect. Neck -Supple ,normal CSP. FROM, non-tender. No significant adenopathy/thyromegaly. No JVD. Chest - CTA. Symmetric chest rise. No wheezing, rales or rhonchi. Heart - Normal rate, regular rhythm. Normal S1, S2. No MGR or clicks. Abdomen - Soft,non-distended. Hypoactive BS in all quadrants. Mild suprapubic TTP, no mass or HSM. Slight CVAT. Ext- Radial, DP pulses, 2+ bilaterally. No pedal edema, clubbing, or cyanosis. Skin-Warm and dry. No hyperpigmentation, ulcerations, or suspicious lesions. Neuro - Normal speech, no focal findings or movement disorder. Normal strength, gait, and muscle tone. Assessment/Plan:  UA + blood and leuk. UC ordered. macrobid prescribed, but referred to URO for eval. Also with blood in ER on 7/2 and similar UA unresolved with Keflex use. Alternate agent used, but CT to r/o renal colic ordered and concerned for GYN cause versus organic cause in bladder. Medication Side Effects and Warnings were discussed with patient: yes   Patient Labs were reviewed: yes  Patient Past Records were reviewed: yes    See below for other orders   Follow-up and Dispositions    · Return in about 1 month (around 8/6/2019) for well woman with labs, URO f/u. ICD-10-CM ICD-9-CM    1. Gross hematuria R31.0 599.71 AMB POC URINALYSIS DIP STICK AUTO W/ MICRO      AMB POC URINE PREGNANCY TEST, VISUAL COLOR COMPARISON      nitrofurantoin, macrocrystal-monohydrate, (MACROBID) 100 mg capsule      REFERRAL TO UROLOGY      CULTURE, URINE      CT ABD PELV WO CONT   2. History of ectopic pregnancy Z87.59 V13.29    3. History of tubal ligation Z98.51 V26.51      Orders Placed This Encounter    CULTURE, URINE    CT ABD PELV WO CONT     Standing Status:   Future     Standing Expiration Date:   7/9/2020     Order Specific Question:   Is Patient Allergic to Contrast Dye? Answer:   No     Order Specific Question:   Is Patient Pregnant? Answer:   No     Order Specific Question:   Type of contrast.  PLEASE NOTE: IV contrast is NOT utilized with this order.      Answer:   Oral    REFERRAL TO UROLOGY     Referral Priority:   Routine     Referral Type:   Consultation     Referral Reason:   Specialty Services Required     Referred to Provider:   Brooke Vanesas MD     Number of Visits Requested:   1    AMB POC URINALYSIS DIP STICK AUTO W/ MICRO    AMB POC URINE PREGNANCY TEST, VISUAL COLOR COMPARISON    nitrofurantoin, macrocrystal-monohydrate, (MACROBID) 100 mg capsule     Sig: Take 1 Cap by mouth two (2) times a day for 7 days. Dispense:  14 Cap     Refill:  0       Cecilio Wei expressed understanding of plan. An After Visit Summary was offered/printed and given to the patient.

## 2019-07-09 NOTE — PATIENT INSTRUCTIONS
Cystoscopy: Before Your Procedure  What is a cystoscopy? A cystoscopy is a procedure that lets a doctor look inside your bladder and urethra. The urethra is the tube that carries urine from the bladder to outside the body. The doctor uses a thin, lighted tool called a cystoscope. With this tool, he or she can look for kidney or bladder stones. The doctor can also look for tumors, bleeding, or infection. If you are in a clinic and you are awake, you will get gel to numb your urethra. This makes the procedure more comfortable. Then the doctor puts the tube into your urethra and moves it into your bladder. Next, the doctor fills your bladder with liquid. This helps him or her see better. It may cause you to feel pressure in your bladder area for a short time. If you are in the hospital, you may get medicine to make you sleep during the procedure. While you are asleep, the doctor can take samples of tissue. These will be checked for cancer and other problems. This is called a biopsy. If you have a biopsy, you may have a small amount of blood in your urine for several days. You may also need a catheter. It's a tube that drains urine from your bladder. Your doctor will take it out at your follow-up visit. Follow-up care is a key part of your treatment and safety. Be sure to make and go to all appointments, and call your doctor if you are having problems. It's also a good idea to know your test results and keep a list of the medicines you take. What happens before the procedure?   Preparing for the procedure    · Understand exactly what procedure is planned, along with the risks, benefits, and other options. · Tell your doctors ALL the medicines, vitamins, supplements, and herbal remedies you take. Some of these can increase the risk of bleeding or interact with anesthesia.     · If you take blood thinners, such as warfarin (Coumadin), clopidogrel (Plavix), or aspirin, be sure to talk to your doctor.  He or she will tell you if you should stop taking these medicines before your procedure. Make sure that you understand exactly what your doctor wants you to do.     · Your doctor will tell you which medicines to take or stop before your procedure. You may need to stop taking certain medicines a week or more before the procedure. So talk to your doctor as soon as you can.     · If you have an advance directive, let your doctor know. It may include a living will and a durable power of  for health care. Bring a copy to the hospital. If you don't have one, you may want to prepare one. It lets your doctor and loved ones know your health care wishes. Doctors advise that everyone prepare these papers before any type of surgery or procedure. Procedures can be stressful. This information will help you understand what you can expect. And it will help you safely prepare for your procedure. What happens on the day of the procedure? · Follow the instructions exactly about when to stop eating and drinking. If you don't, your procedure may be canceled. If your doctor told you to take your medicines on the day of your procedure, take them with only a sip of water.     · Take a bath or shower before you come in for your procedure. Do not apply lotions, perfumes, deodorants, or nail polish.     · Take off all jewelry and piercings. And take out contact lenses, if you wear them.    At the hospital or surgery center   · Bring a picture ID.     · You will be asked to empty your bladder just before the procedure.     · You will be kept comfortable and safe by your anesthesia provider. The anesthesia may make you sleep. Or it may just numb the area being worked on.     · In most cases, the cystoscope is in the bladder for less than 10 minutes. But the entire test may take up to 45 minutes or longer. Going home   · Be sure you have someone to drive you home.  Anesthesia and pain medicine make it unsafe for you to drive.     · You will be given more specific instructions about recovering from your procedure. They will cover things like diet, wound care, follow-up care, driving, and getting back to your normal routine. When should you call your doctor? · You have questions or concerns.     · You don't understand how to prepare for your procedure.     · You become ill before the procedure (such as fever, flu, or a cold).     · You need to reschedule or have changed your mind about having the procedure. Where can you learn more? Go to http://roula-susan.info/. Enter E180 in the search box to learn more about \"Cystoscopy: Before Your Procedure. \"  Current as of: March 20, 2018  Content Version: 11.9  © 5545-0778 [employer]. Care instructions adapted under license by [employer] (which disclaims liability or warranty for this information). If you have questions about a medical condition or this instruction, always ask your healthcare professional. [address] any warranty or liability for your use of this information.

## 2019-07-09 NOTE — PROGRESS NOTES
Pt is here for   Chief Complaint   Patient presents with    Urinary Pain     pt states with blood in urine       Pt states pain level is a 8/10 back legs and abdomen. 1. Have you been to the ER, urgent care clinic since your last visit? Hospitalized since your last visit? Yes When: 7/2/19 [facility] for Urinary symptoms    2. Have you seen or consulted any other health care providers outside of the [address] [place] [address] since your last visit? Include any pap smears or colon screening.  No

## 2019-07-10 ENCOUNTER — APPOINTMENT (OUTPATIENT)
Dept: ULTRASOUND IMAGING | Age: 34
End: 2019-07-10
Attending: PHYSICIAN ASSISTANT
Payer: MEDICAID

## 2019-07-10 VITALS
SYSTOLIC BLOOD PRESSURE: 122 MMHG | HEART RATE: 76 BPM | RESPIRATION RATE: 18 BRPM | TEMPERATURE: 98 F | DIASTOLIC BLOOD PRESSURE: 78 MMHG | OXYGEN SATURATION: 94 %

## 2019-07-10 LAB
ATRIAL RATE: 81 BPM
CALCULATED P AXIS, ECG09: 74 DEGREES
CALCULATED R AXIS, ECG10: 56 DEGREES
CALCULATED T AXIS, ECG11: 50 DEGREES
DIAGNOSIS, 93000: NORMAL
P-R INTERVAL, ECG05: 218 MS
Q-T INTERVAL, ECG07: 334 MS
QRS DURATION, ECG06: 82 MS
QTC CALCULATION (BEZET), ECG08: 387 MS
VENTRICULAR RATE, ECG03: 81 BPM

## 2019-07-10 PROCEDURE — 76830 TRANSVAGINAL US NON-OB: CPT

## 2019-07-10 PROCEDURE — 76856 US EXAM PELVIC COMPLETE: CPT

## 2019-07-10 NOTE — ED NOTES
Patient's significant other grabbed this RN to assist patient. Patient reports, \"I feel like I'm wet when I sit in that chair [wheelchair]\" RN clarified with patient, she indicates that she has been incontinent of urine. Patient transferred from the chair in R41 into a wheelchair, without any difficulty. States that she is unable to walk to the bathroom. Patient wheeled into the bathroom, she stepped out of the wheelchair with steady gait and no assistance to the toilet. Patient then changed into brief and paper scrubs with no difficulty standing on one foot at a time. RN inspected patient's home pants, which are not wet. Patient then reports that she cannot stand up to get back into wheelchair. RN assisted patient back into the wheelchair. Patient then moved herself out of the wheelchair into the recliner in [address] [place] [address].

## 2019-07-10 NOTE — ED NOTES
Quick Look:  Pt arrives via EMS for abdominal pain and hematuria. Pt diagnosed with UTI on 7/2. Unsure if compliant with ordered abx. Seen by PCP today for continued pain and hematuria. Prescribed new abx and told to follow up with Urologist.  Pt is here because she is not feeling any better.

## 2019-07-10 NOTE — ED PROVIDER NOTES
This is a 29 yo female presenting with complaint of a one week hx of bilateral lower back pain and lower abdominal pain, improved with laying supine, worse with standing. Denies any trauma. No fever, diaphoresis, cough, runny nose, sore throat, nausea, vomiting, constipation, diarrhea, vaginal bleeding or vaginal discharge. Some dysuria. Some CP with ambulating. Saw PCP today. Was scheduled for outpatient CT abd/pel. Increased gross hematuria so presented to ED. Has been noticing hematuria for past 8 days. Was seen at outside ED and diagnosed with UTi. Completed course of Keflex. Followed up with PCP today and was prescribed macrobid secondary to concern for continue UTI. Also ordered outpatient CT abd/pel and urology follow-up.              Past Medical History:   Diagnosis Date    Abscess 12/1/2016    Anemia NEC     Bipolar 1 disorder ([address]     Breast tenderness     bilateral, since 11/2016, on and off     Depression     Dizziness     Gastrointestinal disorder     pancreatitis    Increased frequency of headaches     Kidney stones 10/2012    Nausea     Nipple discharge 2009    white/clear discharge since birth of child     Other ill-defined conditions(799.89)     sickle cell trait    Other ill-defined conditions(799.89)     anemia    Psychiatric disorder     depression, bi-polar    Psychotic disorder ([address]     bipolar 1    Sickle cell trait ([address]     Sickle cell trait ([address]     Stomach pain        Past Surgical History:   Procedure Laterality Date    HX GYN      tubaligation    HX GYN      ectopic pregnancy surgery    HX OTHER SURGICAL      hx of blood transfusions         Family History:   Problem Relation Age of Onset    Heart Disease Father        Social History     Socioeconomic History    Marital status: SINGLE     Spouse name: Not on file    Number of children: Not on file    Years of education: Not on file    Highest education level: Not on file   Occupational History    Not on file   Social Needs    Financial resource strain: Not on file    Food insecurity:     Worry: Not on file     Inability: Not on file    Transportation needs:     Medical: Not on file     Non-medical: Not on file   Tobacco Use    Smoking status: Current Every Day Smoker     Packs/day: 0.25     Years: 9.00     Pack years: 2.25    Smokeless tobacco: Never Used   Substance and Sexual Activity    Alcohol use: No    Drug use: No     Types: Marijuana     Comment: occasional    Sexual activity: Yes     Partners: Male     Birth control/protection: None   Lifestyle    Physical activity:     Days per week: Not on file     Minutes per session: Not on file    Stress: Not on file   Relationships    Social connections:     Talks on phone: Not on file     Gets together: Not on file     Attends Restoration service: Not on file     Active member of club or organization: Not on file     Attends meetings of clubs or organizations: Not on file     Relationship status: Not on file    Intimate partner violence:     Fear of current or ex partner: Not on file     Emotionally abused: Not on file     Physically abused: Not on file     Forced sexual activity: Not on file   Other Topics Concern    Not on file   Social History Narrative    Not on file         ALLERGIES: Latex; Naproxen; and Zyrtec [cetirizine]    Review of Systems   Constitutional: Negative. Negative for chills, fatigue and fever. HENT: Negative. Negative for congestion, ear pain, facial swelling, rhinorrhea, sneezing and sore throat. Respiratory: Negative for cough and shortness of breath. Cardiovascular: Positive for chest pain. Negative for leg swelling. Gastrointestinal: Positive for abdominal pain. Negative for constipation, diarrhea, nausea and vomiting. Genitourinary: Positive for dysuria and hematuria. Negative for difficulty urinating, frequency and urgency. Musculoskeletal: Positive for back pain. Skin: Negative for color change. Neurological: Positive for headaches. Negative for numbness. All other systems reviewed and are negative. Vitals:    07/09/19 2143   Pulse: 89   SpO2: 98%            Physical Exam   Constitutional: She is oriented to person, place, and time. She appears well-developed and well-nourished. No distress. AAF in NAD   HENT:   Head: Normocephalic and atraumatic. Right Ear: External ear normal.   Left Ear: External ear normal.   Mouth/Throat: Oropharynx is clear and moist.   Eyes: Pupils are equal, round, and reactive to light. Conjunctivae are normal.   Neck: Normal range of motion. Neck supple. Cardiovascular: Normal rate, regular rhythm and normal heart sounds. Pulmonary/Chest: Effort normal. No respiratory distress. She has no wheezes. Abdominal: Soft. Bowel sounds are normal. She exhibits no distension. There is no tenderness. There is no rebound. Musculoskeletal: Normal range of motion. Neurological: She is alert and oriented to person, place, and time. Skin: Skin is warm and dry. No ecchymosis, no laceration and no lesion noted. Nursing note and vitals reviewed. MDM  Number of Diagnoses or Management Options  Diagnosis management comments: 29 yo female with complaint of bilateral flank pain and lower abdominal pain with associated jessie hematuria in setting of recent UTI. Concern for UTI vs Stone vs pyelo amongst others    Plan  CBC  CMP  UA  CT abd/pel  Monitor. Althea Rincon [place]         Amount and/or Complexity of Data Reviewed  Clinical lab tests: reviewed and ordered  Tests in the radiology section of CPT®: reviewed and ordered  Independent visualization of images, tracings, or specimens: yes           Procedures          Progress note   Labs and imaging reviewed. Hgb stable 11 grams. K3.2. UA without sig e/o infection. Althea Rincon [place]    Ct with likely blood in collecting system and and ovarian cyst. Flow documented bilaterally.  Althea Chambers [place]    Spoke with Dr. Bandar Kramer, urologist, discussed HPI, PE findings, labs and imaging. He recommends good hydration, urine culture and follow-up in the office. Girma Gamino      Discussed case with attending Physician Ankit Khan with care and will D/C with follow up. Girma Almaguer    Patient's results have been reviewed with them. Patient and/or family have verbally conveyed their understanding and agreement of the patient's signs, symptoms, diagnosis, treatment and prognosis and additionally agree to follow up as recommended or return to the Emergency Room should their condition change prior to follow-up. Discharge instructions have also been provided to the patient with some educational information regarding their diagnosis as well a list of reasons why they would want to return to the ER prior to their follow-up appointment should their condition change.  Girma Franklin

## 2019-07-10 NOTE — ED TRIAGE NOTES
Triage note : pt arrives with c/o bilateral flank pain, lower abdominal pain , and headache that started last week . Pt is a&o x4 .  VSS

## 2019-07-10 NOTE — ED NOTES
Discharge paperwork given by provider, VSS and in no acute distress. Ambulated with a steady gait out of the department.

## 2019-07-11 LAB
BACTERIA SPEC CULT: NORMAL
BACTERIA UR CULT: NO GROWTH
CC UR VC: NORMAL
SERVICE CMNT-IMP: NORMAL

## 2019-08-07 ENCOUNTER — TELEPHONE (OUTPATIENT)
Dept: INTERNAL MEDICINE CLINIC | Age: 34
End: 2019-08-07

## 2019-08-07 NOTE — TELEPHONE ENCOUNTER
Returned call, but pt accidentally called wrong office earlier. Has since contacted the right office. Rescheduling visit missed yesterday with PSR now.

## 2019-08-07 NOTE — TELEPHONE ENCOUNTER
Roberto/telephone   Received: Today   Message Contents   Kate, [address] [facility] [address] [facility]             Pt is requesting for you to call her in regard to a procedure she had at [facility] last month. Pts number is [phone].

## 2020-01-05 ENCOUNTER — APPOINTMENT (OUTPATIENT)
Dept: ULTRASOUND IMAGING | Age: 35
End: 2020-01-05
Attending: EMERGENCY MEDICINE
Payer: MEDICAID

## 2020-01-05 ENCOUNTER — HOSPITAL ENCOUNTER (OUTPATIENT)
Age: 35
Setting detail: OBSERVATION
Discharge: HOME | End: 2020-01-06
Attending: EMERGENCY MEDICINE | Admitting: INTERNAL MEDICINE
Payer: MEDICAID

## 2020-01-05 DIAGNOSIS — N13.5 URETERAL STRICTURE, RIGHT: ICD-10-CM

## 2020-01-05 DIAGNOSIS — N30.00 ACUTE CYSTITIS WITHOUT HEMATURIA: Primary | ICD-10-CM

## 2020-01-05 DIAGNOSIS — N13.4 HYDROURETER: ICD-10-CM

## 2020-01-05 PROBLEM — N39.0 UTI (URINARY TRACT INFECTION): Status: ACTIVE | Noted: 2020-01-05

## 2020-01-05 LAB
ALBUMIN SERPL-MCNC: 3.8 G/DL (ref 3.5–5)
ALBUMIN/GLOB SERPL: 1 {RATIO} (ref 1.1–2.2)
ALP SERPL-CCNC: 70 U/L (ref 45–117)
ALT SERPL-CCNC: 14 U/L (ref 12–78)
ANION GAP SERPL CALC-SCNC: 6 MMOL/L (ref 5–15)
APPEARANCE UR: ABNORMAL
AST SERPL-CCNC: 10 U/L (ref 15–37)
BACTERIA URNS QL MICRO: ABNORMAL /HPF
BASOPHILS # BLD: 0.1 K/UL (ref 0–0.1)
BASOPHILS NFR BLD: 1 % (ref 0–1)
BILIRUB SERPL-MCNC: 0.4 MG/DL (ref 0.2–1)
BILIRUB UR QL: NEGATIVE
BUN SERPL-MCNC: 10 MG/DL (ref 6–20)
BUN/CREAT SERPL: 12 (ref 12–20)
CALCIUM SERPL-MCNC: 8.7 MG/DL (ref 8.5–10.1)
CHLORIDE SERPL-SCNC: 108 MMOL/L (ref 97–108)
CO2 SERPL-SCNC: 28 MMOL/L (ref 21–32)
COLOR UR: ABNORMAL
COMMENT, HOLDF: NORMAL
CREAT SERPL-MCNC: 0.84 MG/DL (ref 0.55–1.02)
DIFFERENTIAL METHOD BLD: ABNORMAL
EOSINOPHIL # BLD: 0.4 K/UL (ref 0–0.4)
EOSINOPHIL NFR BLD: 7 % (ref 0–7)
EPITH CASTS URNS QL MICRO: ABNORMAL /LPF
ERYTHROCYTE [DISTWIDTH] IN BLOOD BY AUTOMATED COUNT: 18.1 % (ref 11.5–14.5)
GLOBULIN SER CALC-MCNC: 3.7 G/DL (ref 2–4)
GLUCOSE SERPL-MCNC: 88 MG/DL (ref 65–100)
GLUCOSE UR STRIP.AUTO-MCNC: NEGATIVE MG/DL
HCG SERPL QL: NEGATIVE
HCT VFR BLD AUTO: 30.9 % (ref 35–47)
HGB BLD-MCNC: 9.8 G/DL (ref 11.5–16)
HGB UR QL STRIP: ABNORMAL
HYALINE CASTS URNS QL MICRO: ABNORMAL /LPF (ref 0–5)
IMM GRANULOCYTES # BLD AUTO: 0 K/UL (ref 0–0.04)
IMM GRANULOCYTES NFR BLD AUTO: 0 % (ref 0–0.5)
KETONES UR QL STRIP.AUTO: NEGATIVE MG/DL
LEUKOCYTE ESTERASE UR QL STRIP.AUTO: ABNORMAL
LIPASE SERPL-CCNC: 71 U/L (ref 73–393)
LYMPHOCYTES # BLD: 1.2 K/UL (ref 0.8–3.5)
LYMPHOCYTES NFR BLD: 21 % (ref 12–49)
MCH RBC QN AUTO: 24.3 PG (ref 26–34)
MCHC RBC AUTO-ENTMCNC: 31.7 G/DL (ref 30–36.5)
MCV RBC AUTO: 76.5 FL (ref 80–99)
MONOCYTES # BLD: 0.4 K/UL (ref 0–1)
MONOCYTES NFR BLD: 7 % (ref 5–13)
NEUTS SEG # BLD: 3.7 K/UL (ref 1.8–8)
NEUTS SEG NFR BLD: 64 % (ref 32–75)
NITRITE UR QL STRIP.AUTO: POSITIVE
NRBC # BLD: 0 K/UL (ref 0–0.01)
NRBC BLD-RTO: 0 PER 100 WBC
PH UR STRIP: 6 [PH] (ref 5–8)
PLATELET # BLD AUTO: 234 K/UL (ref 150–400)
PMV BLD AUTO: 12 FL (ref 8.9–12.9)
POTASSIUM SERPL-SCNC: 3.4 MMOL/L (ref 3.5–5.1)
PROT SERPL-MCNC: 7.5 G/DL (ref 6.4–8.2)
PROT UR STRIP-MCNC: NEGATIVE MG/DL
RBC # BLD AUTO: 4.04 M/UL (ref 3.8–5.2)
RBC #/AREA URNS HPF: ABNORMAL /HPF (ref 0–5)
SAMPLES BEING HELD,HOLD: NORMAL
SODIUM SERPL-SCNC: 142 MMOL/L (ref 136–145)
SP GR UR REFRACTOMETRY: 1.01 (ref 1–1.03)
UA: UC IF INDICATED,UAUC: ABNORMAL
UROBILINOGEN UR QL STRIP.AUTO: 1 EU/DL (ref 0.2–1)
WBC # BLD AUTO: 5.6 K/UL (ref 3.6–11)
WBC URNS QL MICRO: ABNORMAL /HPF (ref 0–4)

## 2020-01-05 PROCEDURE — 74011250636 HC RX REV CODE- 250/636: Performed by: EMERGENCY MEDICINE

## 2020-01-05 PROCEDURE — 81001 URINALYSIS AUTO W/SCOPE: CPT

## 2020-01-05 PROCEDURE — 87086 URINE CULTURE/COLONY COUNT: CPT

## 2020-01-05 PROCEDURE — 99218 HC RM OBSERVATION: CPT

## 2020-01-05 PROCEDURE — 96361 HYDRATE IV INFUSION ADD-ON: CPT

## 2020-01-05 PROCEDURE — 76830 TRANSVAGINAL US NON-OB: CPT

## 2020-01-05 PROCEDURE — 96365 THER/PROPH/DIAG IV INF INIT: CPT

## 2020-01-05 PROCEDURE — 84703 CHORIONIC GONADOTROPIN ASSAY: CPT

## 2020-01-05 PROCEDURE — 74011000258 HC RX REV CODE- 258: Performed by: EMERGENCY MEDICINE

## 2020-01-05 PROCEDURE — 96366 THER/PROPH/DIAG IV INF ADDON: CPT

## 2020-01-05 PROCEDURE — 76700 US EXAM ABDOM COMPLETE: CPT

## 2020-01-05 PROCEDURE — 93005 ELECTROCARDIOGRAM TRACING: CPT

## 2020-01-05 PROCEDURE — 96375 TX/PRO/DX INJ NEW DRUG ADDON: CPT

## 2020-01-05 PROCEDURE — 76856 US EXAM PELVIC COMPLETE: CPT

## 2020-01-05 PROCEDURE — 36415 COLL VENOUS BLD VENIPUNCTURE: CPT

## 2020-01-05 PROCEDURE — 85025 COMPLETE CBC W/AUTO DIFF WBC: CPT

## 2020-01-05 PROCEDURE — 80053 COMPREHEN METABOLIC PANEL: CPT

## 2020-01-05 PROCEDURE — 99284 EMERGENCY DEPT VISIT MOD MDM: CPT

## 2020-01-05 PROCEDURE — 83690 ASSAY OF LIPASE: CPT

## 2020-01-05 RX ORDER — ONDANSETRON 2 MG/ML
4 INJECTION INTRAMUSCULAR; INTRAVENOUS
Status: DISCONTINUED | OUTPATIENT
Start: 2020-01-05 | End: 2020-01-06 | Stop reason: HOSPADM

## 2020-01-05 RX ORDER — ENOXAPARIN SODIUM 100 MG/ML
40 INJECTION SUBCUTANEOUS EVERY 24 HOURS
Status: DISCONTINUED | OUTPATIENT
Start: 2020-01-05 | End: 2020-01-05

## 2020-01-05 RX ORDER — HYDROMORPHONE HYDROCHLORIDE 1 MG/ML
0.5 INJECTION, SOLUTION INTRAMUSCULAR; INTRAVENOUS; SUBCUTANEOUS
Status: DISCONTINUED | OUTPATIENT
Start: 2020-01-05 | End: 2020-01-06 | Stop reason: HOSPADM

## 2020-01-05 RX ORDER — SODIUM CHLORIDE 0.9 % (FLUSH) 0.9 %
5-40 SYRINGE (ML) INJECTION EVERY 8 HOURS
Status: DISCONTINUED | OUTPATIENT
Start: 2020-01-05 | End: 2020-01-06 | Stop reason: HOSPADM

## 2020-01-05 RX ORDER — OXYCODONE AND ACETAMINOPHEN 10; 325 MG/1; MG/1
1 TABLET ORAL
Status: DISCONTINUED | OUTPATIENT
Start: 2020-01-05 | End: 2020-01-06 | Stop reason: HOSPADM

## 2020-01-05 RX ORDER — SODIUM CHLORIDE 0.9 % (FLUSH) 0.9 %
5-40 SYRINGE (ML) INJECTION AS NEEDED
Status: DISCONTINUED | OUTPATIENT
Start: 2020-01-05 | End: 2020-01-06 | Stop reason: HOSPADM

## 2020-01-05 RX ORDER — ACETAMINOPHEN 325 MG/1
650 TABLET ORAL
Status: DISCONTINUED | OUTPATIENT
Start: 2020-01-05 | End: 2020-01-06 | Stop reason: HOSPADM

## 2020-01-05 RX ORDER — KETOROLAC TROMETHAMINE 30 MG/ML
15 INJECTION, SOLUTION INTRAMUSCULAR; INTRAVENOUS
Status: COMPLETED | OUTPATIENT
Start: 2020-01-05 | End: 2020-01-05

## 2020-01-05 RX ORDER — ONDANSETRON 2 MG/ML
4 INJECTION INTRAMUSCULAR; INTRAVENOUS
Status: COMPLETED | OUTPATIENT
Start: 2020-01-05 | End: 2020-01-05

## 2020-01-05 RX ADMIN — SODIUM CHLORIDE 1000 ML: 900 INJECTION, SOLUTION INTRAVENOUS at 13:08

## 2020-01-05 RX ADMIN — Medication 10 ML: at 20:37

## 2020-01-05 RX ADMIN — SODIUM CHLORIDE 1000 ML: 900 INJECTION, SOLUTION INTRAVENOUS at 14:54

## 2020-01-05 RX ADMIN — ONDANSETRON 4 MG: 2 INJECTION INTRAMUSCULAR; INTRAVENOUS at 13:08

## 2020-01-05 RX ADMIN — CEFTRIAXONE 1 G: 1 INJECTION, POWDER, FOR SOLUTION INTRAMUSCULAR; INTRAVENOUS at 14:54

## 2020-01-05 RX ADMIN — KETOROLAC TROMETHAMINE 15 MG: 30 INJECTION, SOLUTION INTRAMUSCULAR at 13:08

## 2020-01-05 NOTE — PROGRESS NOTES
Asked to call gyne hospitalist by Ed provider   I believe  the Ed provider should communicate with the required consultant   I Need Gyne evaluation before admitting the patient .

## 2020-01-05 NOTE — H&P
History & Physical    Primary Care Provider: Leti Interiano NP  Source of Information: Patient     History of Presenting Illness:   77-year-old female with medical history significant for kidney stones, ovarian cyst status post laparoscopy in 2010, depression and bipolar presents to the hospital with chief complaint of sudden onset abdominal pain of 1 day duration. Per patient she woke up this morning and started to feel nauseous followed by vomiting. She then experienced intense bilateral lower abdominal colicky pain. Pain was so severe that patient needed to come to the hospital.  During my evaluation, she described the pain as sharp, and nonradiating with a scale of 7/10. Patient denies dysuria, urgency, frequency, fever, diarrhea, shortness of breath or other symptoms. Review of Systems:  Review of Systems   Constitutional: Negative for activity change, appetite change and fatigue. HENT: Negative for ear pain, facial swelling, sore throat and trouble swallowing.    Eyes: No visual disturbance. Negative for pain and discharge. Respiratory: Negative for chest tightness, shortness of breath and wheezing.    Cardiovascular: Negative for chest pain and palpitations. Gastrointestinal: Per HPI   Genitourinary: Per HPI    Musculoskeletal: Negative for arthralgias, joint swelling, myalgias and neck pain. Skin: Negative for color change and rash. Neurological: Negative for  dizziness, headache, weakness or numbness. Hematological: Negative for adenopathy. Does not bruise/bleed easily. Psychiatric/Behavioral: Negative for behavioral problems, confusion and sleep disturbance.   All other systems reviewed and are negative.         Past Medical History:   Diagnosis Date    Abscess 12/1/2016    Anemia NEC     [facility] disorder ([facility] [address]     Breast tenderness     bilateral, since 11/2016, on and off     Depression     Dizziness     Gastrointestinal disorder pancreatitis    Increased frequency of headaches     Kidney stones 10/2012    Nausea     Nipple discharge 2009    white/clear discharge since birth of child     Other ill-defined conditions(799.89)     sickle cell trait    Other ill-defined conditions(799.89)     anemia    Psychiatric disorder     depression, bi-polar    Psychotic disorder ([facility] [address]     bipolar 1    Sickle cell trait ([facility] [address]     Sickle cell trait ([facility] [address]     Stomach pain       Past Surgical History:   Procedure Laterality Date    HX GYN      tubaligation    HX GYN      ectopic pregnancy surgery    HX OTHER SURGICAL      hx of blood transfusions     Prior to Admission medications    Not on File     Allergies   Allergen Reactions    Latex Itching    Naproxen Vertigo    Zyrtec [Cetirizine] Vertigo      Family History   Problem Relation Age of Onset    Heart Disease Father         SOCIAL HISTORY:  Patient resides:  Independently x   Assisted Living    SNF    With family care       Smoking history:   None    Former    Chronic x     Alcohol history:   None    Social x   Chronic    Patient also smokes marijuana her last intake was yesterday evening  Ambulates:   Independently x   w/cane    w/walker    w/wc    CODE STATUS:  DNR    Full x   Other      Objective:     Physical Exam:     Visit Vitals  /67 (BP 1 Location: Left arm)   Pulse (!) 58   Temp 100 °F (37.8 °C)   Resp 18   Ht 5' 7\" (1.702 m)   Wt 69.4 kg (153 lb)   SpO2 100%   BMI 23.96 kg/m²      O2 Device: Room air    General:  Alert, cooperative, no distress, appears stated age. Head:  Normocephalic, without obvious abnormality, atraumatic. Eyes:  Conjunctivae/corneas clear. PERRL, EOMs intact. Nose: Nares normal. Septum midline. Mucosa normal. No drainage or sinus tenderness. Throat: Lips, mucosa, and tongue normal. Teeth and gums normal.   Neck: Supple, symmetrical, trachea midline, no adenopathy, thyroid: no enlargement/tenderness/nodules, no carotid bruit and no JVD. Back:   Symmetric, no curvature. ROM normal. No CVA tenderness. Lungs:   Clear to auscultation bilaterally. Chest wall:  No tenderness or deformity. Heart:  Regular rate and rhythm, S1, S2 normal, no murmur, click, rub or gallop. Abdomen:   Soft, non-tender. Bowel sounds normal. No masses,  No organomegaly. Extremities: Extremities normal, atraumatic, no cyanosis or edema. Pulses: 2+ and symmetric all extremities. Skin: Skin color, texture, turgor normal. No rashes or lesions   Neurologic: CNII-XII intact. Data Review:     Recent Days:  Recent Labs     01/05/20  1240   WBC 5.6   HGB 9.8*   HCT 30.9*        Recent Labs     01/05/20  1240      K 3.4*      CO2 28   GLU 88   BUN 10   CREA 0.84   CA 8.7   ALB 3.8   TBILI 0.4   SGOT 10*   ALT 14     No results for input(s): PH, PCO2, PO2, HCO3, FIO2 in the last 72 hours. 24 Hour Results:  Recent Results (from the past 24 hour(s))   CBC WITH AUTOMATED DIFF    Collection Time: 01/05/20 12:40 PM   Result Value Ref Range    WBC 5.6 3.6 - 11.0 K/uL    RBC 4.04 3.80 - 5.20 M/uL    HGB 9.8 (L) 11.5 - 16.0 g/dL    HCT 30.9 (L) 35.0 - 47.0 %    MCV 76.5 (L) 80.0 - 99.0 FL    MCH 24.3 (L) 26.0 - 34.0 PG    MCHC 31.7 30.0 - 36.5 g/dL    RDW 18.1 (H) 11.5 - 14.5 %    PLATELET 904 562 - 683 K/uL    MPV 12.0 8.9 - 12.9 FL    NRBC 0.0 0  WBC    ABSOLUTE NRBC 0.00 0.00 - 0.01 K/uL    NEUTROPHILS 64 32 - 75 %    LYMPHOCYTES 21 12 - 49 %    MONOCYTES 7 5 - 13 %    EOSINOPHILS 7 0 - 7 %    BASOPHILS 1 0 - 1 %    IMMATURE GRANULOCYTES 0 0.0 - 0.5 %    ABS. NEUTROPHILS 3.7 1.8 - 8.0 K/UL    ABS. LYMPHOCYTES 1.2 0.8 - 3.5 K/UL    ABS. MONOCYTES 0.4 0.0 - 1.0 K/UL    ABS. EOSINOPHILS 0.4 0.0 - 0.4 K/UL    ABS. BASOPHILS 0.1 0.0 - 0.1 K/UL    ABS. IMM.  GRANS. 0.0 0.00 - 0.04 K/UL    DF AUTOMATED     LIPASE    Collection Time: 01/05/20 12:40 PM   Result Value Ref Range    Lipase 71 (L) 73 - 393 U/L   SAMPLES BEING HELD    Collection Time: 01/05/20 12:40 PM   Result Value Ref Range    SAMPLES BEING HELD 1LAV 1BLUE     COMMENT        Add-on orders for these samples will be processed based on acceptable specimen integrity and analyte stability, which may vary by analyte. HCG QL SERUM    Collection Time: 01/05/20 12:40 PM   Result Value Ref Range    HCG, Ql. NEGATIVE  NEG     METABOLIC PANEL, COMPREHENSIVE    Collection Time: 01/05/20 12:40 PM   Result Value Ref Range    Sodium 142 136 - 145 mmol/L    Potassium 3.4 (L) 3.5 - 5.1 mmol/L    Chloride 108 97 - 108 mmol/L    CO2 28 21 - 32 mmol/L    Anion gap 6 5 - 15 mmol/L    Glucose 88 65 - 100 mg/dL    BUN 10 6 - 20 MG/DL    Creatinine 0.84 0.55 - 1.02 MG/DL    BUN/Creatinine ratio 12 12 - 20      GFR est AA >60 >60 ml/min/1.73m2    GFR est non-AA >60 >60 ml/min/1.73m2    Calcium 8.7 8.5 - 10.1 MG/DL    Bilirubin, total 0.4 0.2 - 1.0 MG/DL    ALT (SGPT) 14 12 - 78 U/L    AST (SGOT) 10 (L) 15 - 37 U/L    Alk.  phosphatase 70 45 - 117 U/L    Protein, total 7.5 6.4 - 8.2 g/dL    Albumin 3.8 3.5 - 5.0 g/dL    Globulin 3.7 2.0 - 4.0 g/dL    A-G Ratio 1.0 (L) 1.1 - 2.2     URINALYSIS W/ REFLEX CULTURE    Collection Time: 01/05/20  1:07 PM   Result Value Ref Range    Color YELLOW/STRAW      Appearance CLOUDY (A) CLEAR      Specific gravity 1.011 1.003 - 1.030      pH (UA) 6.0 5.0 - 8.0      Protein NEGATIVE  NEG mg/dL    Glucose NEGATIVE  NEG mg/dL    Ketone NEGATIVE  NEG mg/dL    Bilirubin NEGATIVE  NEG      Blood SMALL (A) NEG      Urobilinogen 1.0 0.2 - 1.0 EU/dL    Nitrites POSITIVE (A) NEG      Leukocyte Esterase TRACE (A) NEG      UA:UC IF INDICATED URINE CULTURE ORDERED (A) CNI      WBC 20-50 0 - 4 /hpf    RBC 0-5 0 - 5 /hpf    Epithelial cells FEW FEW /lpf    Bacteria 4+ (A) NEG /hpf    Hyaline cast 2-5 0 - 5 /lpf         Imaging:     Assessment:     # Acute abdominal pain secondary to UTI with hydronephrosis vs hemorrhagic ovarian cyst  # Substance abuse/tobacco use  # Bipolar/depression  # Hypokalemia  # Anemia- new       Plan:     # Acute abdominal pain secondary to UTI with hydronephrosis vs hemorrhagic ovarian cyst  - Patient does not have significant urinary symptoms, wbc wnl , UA positive for infection vs colonization   - Pelvic US with bilateral hemorrhagic cyst   - Follow urine culture, Renal US Right hydronephrosis of uncertain etiology, similar to prior CT.  - Obgyn consult - if no intervention planned by obgyn then patient can be discharged on oral abx for UTI   - pain control     # Substance abuse/tobacco use  - 3min education on smoking cessation    # Bipolar/depression  - not on medication     # Hypokalemia  -replete     # Anemia- new   - monitor CBC in am     DVT prop: SCDs  Code: Full     Patient from home.  Function status Walks independently        Signed By: Christy Howard MD     January 5, 2020

## 2020-01-05 NOTE — PROGRESS NOTES
Patient presenting with severe back pain and vomiting  V/S unremarkable, no leukocytosis or lactic acidosis  UA remarkable for UTI  US: Stable hydroureter compared to prior studies  Pelvic ultrasound:1. 5.8 cm right ovarian cyst, probably hemorrhagic cyst, measured 7.3 cm  Previously.  2. 3.1 cm complex left ovarian cyst, probably hemorrhagic cyst, measures 3.4 cm  previously (also present in prior study)-Per chart review no prior evaluation by GYN  Would require GYN evaluation for concerning ovarian hemorrhagic cyst  before admitting patient

## 2020-01-05 NOTE — ED PROVIDER NOTES
27 y.o. female with past medical history significant for Bipolar 1 disorder, depression, kidney stones, ovarian cyst, s/p laparoscopy (2010) who presents ambulatory to the ED with chief complaint of abdominal pain. Patient reports that she woke up at 1900 today \"choking\" and afterwards began having multiple episodes of vomiting, pelvic pain and right upper quadrant abdominal pain. She endorses one episode of diarrhea today and states that she had a bowel movement today described as \"soft. \" Patient endorses decreased fluid and PO intake secondary to vomiting. Patient states that she has medical history of ovarian cyst and laparoscopy, and is followed by gynecology. She also states that she has been having intermittent vaginal spotting for the past month. She denies use of pain medications today. Patient endorses vomiting with pain medications, but denies having any rash with use of ibuprofen. She denies blood in stool or any other symptoms. There are no other acute medical concerns at this time. Patient was seen in the ED in July 2019 for ovarian cyst and hematuria. Patient mentions that she had a stent     Social Hx: current 0.25 ppd Tobacco use, rare EtOH use, Patient endorses Illicit Drug use yesterday Jany Gray)  PCP: Bob Smith NP    Note written by Earlie Lennox, Scribe, as dictated by Chelle Austin, DO 12:28 PM      The history is provided by the patient. No  was used.         Past Medical History:   Diagnosis Date    Abscess 12/1/2016    Anemia NEC     [facility] disorder ([facility] [address]     Breast tenderness     bilateral, since 11/2016, on and off     Depression     Dizziness     Gastrointestinal disorder     pancreatitis    Increased frequency of headaches     Kidney stones 10/2012    Nausea     Nipple discharge 2009    white/clear discharge since birth of child     Other ill-defined conditions(799.89)     sickle cell trait    Other ill-defined conditions(799.89) anemia    Psychiatric disorder     depression, bi-polar    Psychotic disorder ([facility])     bipolar 1    [facility]     Sickle cell trait ([facility] [address]     Stomach pain        Past Surgical History:   Procedure Laterality Date    HX GYN      tubaligation    HX GYN      ectopic pregnancy surgery    HX OTHER SURGICAL      hx of blood transfusions         Family History:   Problem Relation Age of Onset    Heart Disease Father        Social History     Socioeconomic History    Marital status: SINGLE     Spouse name: Not on file    Number of children: Not on file    Years of education: Not on file    Highest education level: Not on file   Occupational History    Not on file   Social Needs    Financial resource strain: Not on file    Food insecurity:     Worry: Not on file     Inability: Not on file    Transportation needs:     Medical: Not on file     Non-medical: Not on file   Tobacco Use    Smoking status: Current Every Day Smoker     Packs/day: 0.25     Years: 9.00     Pack years: 2.25    Smokeless tobacco: Never Used   Substance and Sexual Activity    Alcohol use: No    Drug use: No     Types: Marijuana     Comment: occasional    Sexual activity: Yes     Partners: Male     Birth control/protection: None   Lifestyle    Physical activity:     Days per week: Not on file     Minutes per session: Not on file    Stress: Not on file   Relationships    Social connections:     Talks on phone: Not on file     Gets together: Not on file     Attends Muslim service: Not on file     Active member of club or organization: Not on file     Attends meetings of clubs or organizations: Not on file     Relationship status: Not on file    Intimate partner violence:     Fear of current or ex partner: Not on file     Emotionally abused: Not on file     Physically abused: Not on file     Forced sexual activity: Not on file   Other Topics Concern    Not on file   Social History Narrative    Not on file ALLERGIES: Latex; Naproxen; and Zyrtec [cetirizine]    Review of Systems   Respiratory: Negative for choking. Gastrointestinal: Positive for abdominal pain, diarrhea and vomiting. Negative for blood in stool. Genitourinary: Negative for hematuria. All other systems reviewed and are negative. Vitals:    01/05/20 1222   BP: 121/67   Pulse: (!) 58   Resp: 18   Temp: 100 °F (37.8 °C)   SpO2: 100%   Weight: 69.4 kg (153 lb)   Height: 5' 7\" (1.702 m)            Physical Exam        Constitutional: Pt is awake and alert. Pt appears well-developed and well-nourished. NAD. HENT:   Head: Normocephalic and atraumatic. Nose: Nose normal.   Mouth/Throat: Oropharynx is clear and moist. No oropharyngeal exudate. Eyes: Conjunctivae and extraocular motions are normal. Pupils are equal, round, and reactive to light. Right eye exhibits no discharge. Left eye exhibits no discharge. No scleral icterus. Neck: No tracheal deviation present. Supple neck. Cardiovascular: bradycardia, regular rhythm, normal heart sounds and intact distal pulses. Exam reveals no gallop and no friction rub. No murmur heard. Pulmonary/Chest: Effort normal and breath sounds normal.  Pt  has no wheezes. Pt  has no rales. Abdominal: Soft. Pt  exhibits no distension and no mass. Mild lower pelvic tenderness, RUQ tenderness. Pt  has no rebound and no guarding. Musculoskeletal:  Pt  exhibits no edema and no tenderness. Ext: Normal ROM in all four extremities; not tender to palpation; distal pulses are normal, no edema. Neurological:  Pt is alert. nonfocal neuro exam.  Skin: Skin is warm and dry. Pt  is not diaphoretic. Psychiatric:  Pt  has a normal mood and affect. Behavior is normal.     Note written by Gómez Gonsalez, as dictated by He Hoover, DO 12:28 PM    MDM       Procedures    3 PM: Antonio Morgan over her abdominal ultrasound result. She also has UTI. She has no fever right now.   Her fiancé says that she did not have a fever earlier. She has a little bit of low back pain. She has no CVA tenderness right now on either side. Due to the hydronephrosis, will discuss this case with urology. Patient had a urological procedure in July. She had a stent placed. She was told to pull the stent at home. She was also told to follow-up in the office but she \"got scared and did not go back. \"            3:04 PM - d/w Dr Gay Arguello - he reviewed her chart - she has a Right ureteral stricture - supposed to come back for a CT IVP but never came back. If she needs admission, she may need OR and stenting to R ureter again. 3:10 PM: Patient looks well. No low blood pressure. Her heart rate is in the 50s. Will check an EKG. Given her history of vomiting all day, in conjunction with a UTI and right-sided hydro-with ureteral stricture that has gone untreated, I will go ahead and admit her to the hospitalist.  They can consult urology formally. Hospitalist Dl Serve for Admission  3:12 PM    ED Room Number: ER23/23  Patient Name and age:  Krishna Wei 27 y.o.  female  Working Diagnosis:   1. Acute cystitis without hematuria    2. Hydroureter    3. Ureteral stricture, right      Readmission: no  Isolation Requirements:  no  Recommended Level of Care:  med/surg  Code Status:  Full Code  Department:[facility] Adult ED - 21   Other:    Patient has been vomiting all day. Has a UTI. Has right side hydroureter. I have talked to urology on call. She has a ureteral  stricture. She had a stent placed in July. That has been pulled. She was lost to follow-up. There is a concern that she may develop pyelonephritis bc of this stricture. discussed with Dr Joey Barnett, [facility] hospitalist - will see if needed. She will wait on hospitalist to call her    5:24 PM - Dr Balwinder Danielle has not called the [facility] hospitalist to discuss his reasons for him consulting her.   I called Dr Joey Barnett again - she will see the pt shortly and then find Dr Kaden Guillermo and discuss with him. Recent Results (from the past 12 hour(s))   CBC WITH AUTOMATED DIFF    Collection Time: 01/05/20 12:40 PM   Result Value Ref Range    WBC 5.6 3.6 - 11.0 K/uL    RBC 4.04 3.80 - 5.20 M/uL    HGB 9.8 (L) 11.5 - 16.0 g/dL    HCT 30.9 (L) 35.0 - 47.0 %    MCV 76.5 (L) 80.0 - 99.0 FL    MCH 24.3 (L) 26.0 - 34.0 PG    MCHC 31.7 30.0 - 36.5 g/dL    RDW 18.1 (H) 11.5 - 14.5 %    PLATELET 247 313 - 208 K/uL    MPV 12.0 8.9 - 12.9 FL    NRBC 0.0 0  WBC    ABSOLUTE NRBC 0.00 0.00 - 0.01 K/uL    NEUTROPHILS 64 32 - 75 %    LYMPHOCYTES 21 12 - 49 %    MONOCYTES 7 5 - 13 %    EOSINOPHILS 7 0 - 7 %    BASOPHILS 1 0 - 1 %    IMMATURE GRANULOCYTES 0 0.0 - 0.5 %    ABS. NEUTROPHILS 3.7 1.8 - 8.0 K/UL    ABS. LYMPHOCYTES 1.2 0.8 - 3.5 K/UL    ABS. MONOCYTES 0.4 0.0 - 1.0 K/UL    ABS. EOSINOPHILS 0.4 0.0 - 0.4 K/UL    ABS. BASOPHILS 0.1 0.0 - 0.1 K/UL    ABS. IMM. GRANS. 0.0 0.00 - 0.04 K/UL    DF AUTOMATED     LIPASE    Collection Time: 01/05/20 12:40 PM   Result Value Ref Range    Lipase 71 (L) 73 - 393 U/L   SAMPLES BEING HELD    Collection Time: 01/05/20 12:40 PM   Result Value Ref Range    SAMPLES BEING HELD 1LAV 1BLUE     COMMENT        Add-on orders for these samples will be processed based on acceptable specimen integrity and analyte stability, which may vary by analyte.    HCG QL SERUM    Collection Time: 01/05/20 12:40 PM   Result Value Ref Range    HCG, Ql. NEGATIVE  NEG     METABOLIC PANEL, COMPREHENSIVE    Collection Time: 01/05/20 12:40 PM   Result Value Ref Range    Sodium 142 136 - 145 mmol/L    Potassium 3.4 (L) 3.5 - 5.1 mmol/L    Chloride 108 97 - 108 mmol/L    CO2 28 21 - 32 mmol/L    Anion gap 6 5 - 15 mmol/L    Glucose 88 65 - 100 mg/dL    BUN 10 6 - 20 MG/DL    Creatinine 0.84 0.55 - 1.02 MG/DL    BUN/Creatinine ratio 12 12 - 20      GFR est AA >60 >60 ml/min/1.73m2    GFR est non-AA >60 >60 ml/min/1.73m2    Calcium 8.7 8.5 - 10.1 MG/DL    Bilirubin, total 0.4 0.2 - 1.0 MG/DL    ALT (SGPT) 14 12 - 78 U/L    AST (SGOT) 10 (L) 15 - 37 U/L    Alk.  phosphatase 70 45 - 117 U/L    Protein, total 7.5 6.4 - 8.2 g/dL    Albumin 3.8 3.5 - 5.0 g/dL    Globulin 3.7 2.0 - 4.0 g/dL    A-G Ratio 1.0 (L) 1.1 - 2.2     URINALYSIS W/ REFLEX CULTURE    Collection Time: 01/05/20  1:07 PM   Result Value Ref Range    Color YELLOW/STRAW      Appearance CLOUDY (A) CLEAR      Specific gravity 1.011 1.003 - 1.030      pH (UA) 6.0 5.0 - 8.0      Protein NEGATIVE  NEG mg/dL    Glucose NEGATIVE  NEG mg/dL    Ketone NEGATIVE  NEG mg/dL    Bilirubin NEGATIVE  NEG      Blood SMALL (A) NEG      Urobilinogen 1.0 0.2 - 1.0 EU/dL    Nitrites POSITIVE (A) NEG      Leukocyte Esterase TRACE (A) NEG      UA:UC IF INDICATED URINE CULTURE ORDERED (A) CNI      WBC 20-50 0 - 4 /hpf    RBC 0-5 0 - 5 /hpf    Epithelial cells FEW FEW /lpf    Bacteria 4+ (A) NEG /hpf    Hyaline cast 2-5 0 - 5 /lpf

## 2020-01-05 NOTE — CONSULTS
Gynecology Consult    Name: Hernando Fletcher MRN: [id number] SSN: [id number]    YOB: 1985  Age: 27 y.o. Sex: female       Subjective: abdominal pain        Erick Graf is a 27 y.o.  female para 4, who presents to the ED with complaint of abdominal pain. I am asked to see the patient re findings of bilateral ovarian cysts on pelvic ultrasound. She has been diagnosed with a UTI in the ED, has right hydroureter, a history of ureteral stricture with a stent that fell out but she never followed up. She has not seen a gynecologist in many years. She has 4 children - 10, 15, 15, 16 all born vaginally. Past Medical History:   Diagnosis Date    Abscess 12/1/2016    Anemia NEC     Bipolar 1 disorder ([address]     Breast tenderness     bilateral, since 11/2016, on and off     Depression     Dizziness     Gastrointestinal disorder     pancreatitis    Increased frequency of headaches     Kidney stones 10/2012    Nausea     Nipple discharge 2009    white/clear discharge since birth of child     Other ill-defined conditions(799.89)     sickle cell trait    Other ill-defined conditions(799.89)     anemia    Psychiatric disorder     depression, bi-polar    Psychotic disorder ([address]     bipolar 1    Sickle cell trait ([address]     Sickle cell trait ([address]     Stomach pain      Past Surgical History:   Procedure Laterality Date    HX GYN      tubaligation    HX GYN      ectopic pregnancy surgery    HX OTHER SURGICAL      hx of blood transfusions     OB History    No obstetric history on file.        Allergies   Allergen Reactions    Latex Itching    Naproxen Vertigo    Zyrtec [Cetirizine] Vertigo     Current Facility-Administered Medications   Medication Dose Route Frequency Provider Last Rate Last Dose    sodium chloride (NS) flush 5-40 mL  5-40 mL IntraVENous Q8H Lisa Buchanan MD        sodium chloride (NS) flush 5-40 mL  5-40 mL IntraVENous PRN Jonhnie Buchanan MD [place] acetaminophen (TYLENOL) tablet 650 mg  650 mg Oral Q4H PRN Elma Buchanan MD        oxyCODONE-acetaminophen (PERCOCET 10)  mg per tablet 1 Tab  1 Tab Oral Q4H PRN Elma Buchanan MD        HYDROmorphone (PF) (DILAUDID) injection 0.5 mg  0.5 mg IntraVENous Q6H PRN Lisa Buchanan MD        ondansetron (ZOFRAN) injection 4 mg  4 mg IntraVENous Q4H PRN Elma Buchanan MD           Family History   Problem Relation Age of Onset    Heart Disease Father      Social History     Socioeconomic History    Marital status: SINGLE     Spouse name: Not on file    Number of children: Not on file    Years of education: Not on file    Highest education level: Not on file   Occupational History    Not on file   Social Needs    Financial resource strain: Not on file    Food insecurity:     Worry: Not on file     Inability: Not on file    Transportation needs:     Medical: Not on file     Non-medical: Not on file   Tobacco Use    Smoking status: Current Every Day Smoker     Packs/day: 0.25     Years: 9.00     Pack years: 2.25    Smokeless tobacco: Never Used   Substance and Sexual Activity    Alcohol use: No    Drug use: No     Types: Marijuana     Comment: occasional    Sexual activity: Yes     Partners: Male     Birth control/protection: None   Lifestyle    Physical activity:     Days per week: Not on file     Minutes per session: Not on file    Stress: Not on file   Relationships    Social connections:     Talks on phone: Not on file     Gets together: Not on file     Attends Hinduism service: Not on file     Active member of club or organization: Not on file     Attends meetings of clubs or organizations: Not on file     Relationship status: Not on file    Intimate partner violence:     Fear of current or ex partner: Not on file     Emotionally abused: Not on file     Physically abused: Not on file     Forced sexual activity: Not on file   Other Topics Concern    Not on file   Social History Narrative    Not on file       Review of Systems:  A comprehensive review of systems was negative except for that written in the History of Present Illness. Objective:     Vitals:    01/05/20 1222   BP: 121/67   Pulse: (!) 58   Resp: 18   Temp: 100 °F (37.8 °C)   SpO2: 100%   Weight: 69.4 kg (153 lb)   Height: 5' 7\" (1.702 m)       General:  alert, cooperative, no distress, appears stated age   Abdomen: soft, mildly TTP in the lower quadrants. No masses. Genitourinary: defer exam       Recent Results (from the past 12 hour(s))   CBC WITH AUTOMATED DIFF    Collection Time: 01/05/20 12:40 PM   Result Value Ref Range    WBC 5.6 3.6 - 11.0 K/uL    RBC 4.04 3.80 - 5.20 M/uL    HGB 9.8 (L) 11.5 - 16.0 g/dL    HCT 30.9 (L) 35.0 - 47.0 %    MCV 76.5 (L) 80.0 - 99.0 FL    MCH 24.3 (L) 26.0 - 34.0 PG    MCHC 31.7 30.0 - 36.5 g/dL    RDW 18.1 (H) 11.5 - 14.5 %    PLATELET 450 269 - 324 K/uL    MPV 12.0 8.9 - 12.9 FL    NRBC 0.0 0  WBC    ABSOLUTE NRBC 0.00 0.00 - 0.01 K/uL    NEUTROPHILS 64 32 - 75 %    LYMPHOCYTES 21 12 - 49 %    MONOCYTES 7 5 - 13 %    EOSINOPHILS 7 0 - 7 %    BASOPHILS 1 0 - 1 %    IMMATURE GRANULOCYTES 0 0.0 - 0.5 %    ABS. NEUTROPHILS 3.7 1.8 - 8.0 K/UL    ABS. LYMPHOCYTES 1.2 0.8 - 3.5 K/UL    ABS. MONOCYTES 0.4 0.0 - 1.0 K/UL    ABS. EOSINOPHILS 0.4 0.0 - 0.4 K/UL    ABS. BASOPHILS 0.1 0.0 - 0.1 K/UL    ABS. IMM. GRANS. 0.0 0.00 - 0.04 K/UL    DF AUTOMATED     LIPASE    Collection Time: 01/05/20 12:40 PM   Result Value Ref Range    Lipase 71 (L) 73 - 393 U/L   SAMPLES BEING HELD    Collection Time: 01/05/20 12:40 PM   Result Value Ref Range    SAMPLES BEING HELD 1LAV 1BLUE     COMMENT        Add-on orders for these samples will be processed based on acceptable specimen integrity and analyte stability, which may vary by analyte.    HCG QL SERUM    Collection Time: 01/05/20 12:40 PM   Result Value Ref Range    HCG, Ql. NEGATIVE  NEG     METABOLIC PANEL, COMPREHENSIVE    Collection Time: 01/05/20 12:40 PM   Result Value Ref Range    Sodium 142 136 - 145 mmol/L    Potassium 3.4 (L) 3.5 - 5.1 mmol/L    Chloride 108 97 - 108 mmol/L    CO2 28 21 - 32 mmol/L    Anion gap 6 5 - 15 mmol/L    Glucose 88 65 - 100 mg/dL    BUN 10 6 - 20 MG/DL    Creatinine 0.84 0.55 - 1.02 MG/DL    BUN/Creatinine ratio 12 12 - 20      GFR est AA >60 >60 ml/min/1.73m2    GFR est non-AA >60 >60 ml/min/1.73m2    Calcium 8.7 8.5 - 10.1 MG/DL    Bilirubin, total 0.4 0.2 - 1.0 MG/DL    ALT (SGPT) 14 12 - 78 U/L    AST (SGOT) 10 (L) 15 - 37 U/L    Alk. phosphatase 70 45 - 117 U/L    Protein, total 7.5 6.4 - 8.2 g/dL    Albumin 3.8 3.5 - 5.0 g/dL    Globulin 3.7 2.0 - 4.0 g/dL    A-G Ratio 1.0 (L) 1.1 - 2.2     URINALYSIS W/ REFLEX CULTURE    Collection Time: 01/05/20  1:07 PM   Result Value Ref Range    Color YELLOW/STRAW      Appearance CLOUDY (A) CLEAR      Specific gravity 1.011 1.003 - 1.030      pH (UA) 6.0 5.0 - 8.0      Protein NEGATIVE  NEG mg/dL    Glucose NEGATIVE  NEG mg/dL    Ketone NEGATIVE  NEG mg/dL    Bilirubin NEGATIVE  NEG      Blood SMALL (A) NEG      Urobilinogen 1.0 0.2 - 1.0 EU/dL    Nitrites POSITIVE (A) NEG      Leukocyte Esterase TRACE (A) NEG      UA:UC IF INDICATED URINE CULTURE ORDERED (A) CNI      WBC 20-50 0 - 4 /hpf    RBC 0-5 0 - 5 /hpf    Epithelial cells FEW FEW /lpf    Bacteria 4+ (A) NEG /hpf    Hyaline cast 2-5 0 - 5 /lpf     Ultrasound results from today:    TRANSVAGINAL SONOGRAM  is performed following Pelvic Sonogram  in order to more  accurately measure the endometrial thickness and to more adequately delineate  ovarian anatomy.     The uterus is normal in echotexture with no evidence for fibroids. Endometrial  stripe measures 4 mm.      Right ovarian complex cyst by this technique measures approximately 5.8 x 5.7 x  5.3 cm. Left ovarian complex cyst by this technique measures 3.1 x 2.9 x 2.6 cm. Right ovary measures 3.6 x 7.2 x 6.3 cm and left ovary 4.3 x 3.3 x 3.2 cm.  Flow  is preserved in both ovaries. IMPRESSION  IMPRESSION:   1. 5.8 cm right ovarian cyst, probably hemorrhagic cyst, measured 7.3 cm  previously. 2. 3.1 cm complex left ovarian cyst, probably hemorrhagic cyst, measures 3.4 cm  Previously. **note previous ultrasound was 7/2019    Assessment:     Bilateral ovarian cysts, right cyst decreased in size from prior study, left cyst stable, no concern for torsion    Plan:     Given that ovarian cysts are decreased/stable no acute GYN intervention required at this time. This can be managed as an outpatient with a gynecologist. I explained the benign nature of the ultrasound findings and need for follow up with Ms. Amanda Callahan and her partner. She didn't like her last doctor so that's why she hasn't been back. She has insurance so I recommended [facility], [facility], or [facility] as practices who are here. All questions answered. I communicated my recommendations in person to Dr. Ruben Ortiz. Please call if you have questions - [phone].

## 2020-01-06 VITALS
HEART RATE: 67 BPM | DIASTOLIC BLOOD PRESSURE: 73 MMHG | HEIGHT: 67 IN | RESPIRATION RATE: 17 BRPM | OXYGEN SATURATION: 100 % | SYSTOLIC BLOOD PRESSURE: 114 MMHG | BODY MASS INDEX: 24.01 KG/M2 | WEIGHT: 153 LBS | TEMPERATURE: 97.9 F

## 2020-01-06 LAB
ANION GAP SERPL CALC-SCNC: 5 MMOL/L (ref 5–15)
ATRIAL RATE: 47 BPM
BACTERIA SPEC CULT: NORMAL
BUN SERPL-MCNC: 10 MG/DL (ref 6–20)
BUN/CREAT SERPL: 13 (ref 12–20)
CALCIUM SERPL-MCNC: 8 MG/DL (ref 8.5–10.1)
CALCULATED P AXIS, ECG09: 75 DEGREES
CALCULATED R AXIS, ECG10: 49 DEGREES
CALCULATED T AXIS, ECG11: 27 DEGREES
CC UR VC: NORMAL
CHLORIDE SERPL-SCNC: 110 MMOL/L (ref 97–108)
CO2 SERPL-SCNC: 25 MMOL/L (ref 21–32)
CREAT SERPL-MCNC: 0.75 MG/DL (ref 0.55–1.02)
DIAGNOSIS, 93000: NORMAL
ERYTHROCYTE [DISTWIDTH] IN BLOOD BY AUTOMATED COUNT: 18 % (ref 11.5–14.5)
GLUCOSE SERPL-MCNC: 83 MG/DL (ref 65–100)
HCT VFR BLD AUTO: 26.5 % (ref 35–47)
HGB BLD-MCNC: 8.7 G/DL (ref 11.5–16)
MCH RBC QN AUTO: 24.6 PG (ref 26–34)
MCHC RBC AUTO-ENTMCNC: 32.8 G/DL (ref 30–36.5)
MCV RBC AUTO: 74.9 FL (ref 80–99)
NRBC # BLD: 0 K/UL (ref 0–0.01)
NRBC BLD-RTO: 0 PER 100 WBC
P-R INTERVAL, ECG05: 224 MS
PLATELET # BLD AUTO: 189 K/UL (ref 150–400)
PMV BLD AUTO: 11.6 FL (ref 8.9–12.9)
POTASSIUM SERPL-SCNC: 3.3 MMOL/L (ref 3.5–5.1)
Q-T INTERVAL, ECG07: 420 MS
QRS DURATION, ECG06: 82 MS
QTC CALCULATION (BEZET), ECG08: 371 MS
RBC # BLD AUTO: 3.54 M/UL (ref 3.8–5.2)
SERVICE CMNT-IMP: NORMAL
SODIUM SERPL-SCNC: 140 MMOL/L (ref 136–145)
VENTRICULAR RATE, ECG03: 47 BPM
WBC # BLD AUTO: 5.6 K/UL (ref 3.6–11)

## 2020-01-06 PROCEDURE — 99218 HC RM OBSERVATION: CPT

## 2020-01-06 PROCEDURE — 36415 COLL VENOUS BLD VENIPUNCTURE: CPT

## 2020-01-06 PROCEDURE — 80048 BASIC METABOLIC PNL TOTAL CA: CPT

## 2020-01-06 PROCEDURE — 85027 COMPLETE CBC AUTOMATED: CPT

## 2020-01-06 PROCEDURE — 74011250637 HC RX REV CODE- 250/637: Performed by: INTERNAL MEDICINE

## 2020-01-06 RX ORDER — POTASSIUM CHLORIDE 750 MG/1
40 TABLET, FILM COATED, EXTENDED RELEASE ORAL
Status: COMPLETED | OUTPATIENT
Start: 2020-01-06 | End: 2020-01-06

## 2020-01-06 RX ORDER — CIPROFLOXACIN 500 MG/1
500 TABLET ORAL 2 TIMES DAILY
Qty: 12 TAB | Refills: 0 | Status: SHIPPED | OUTPATIENT
Start: 2020-01-06 | End: 2020-01-12

## 2020-01-06 RX ADMIN — OXYCODONE HYDROCHLORIDE AND ACETAMINOPHEN 1 TABLET: 10; 325 TABLET ORAL at 11:04

## 2020-01-06 RX ADMIN — POTASSIUM CHLORIDE 40 MEQ: 750 TABLET, FILM COATED, EXTENDED RELEASE ORAL at 09:14

## 2020-01-06 NOTE — DISCHARGE SUMMARY
Discharge Summary       PATIENT ID: Analisa Mccormick  MRN: [id number]   YOB: 1985    DATE OF ADMISSION: 1/5/2020 12:13 PM    DATE OF DISCHARGE: 01/06/20  PRIMARY CARE PROVIDER: Deepika Pearson NP       DISCHARGING PROVIDER: Toya Elliott MD    To contact this individual call [phone] and ask the  to page. If unavailable ask to be transferred the Adult Hospitalist Department. CONSULTATIONS: IP CONSULT TO OB GYN  IP CONSULT TO HOSPITALIST      PROCEDURES/SURGERIES: * No surgery found *    IMAGING  Us Abd Comp    Result Date: 1/5/2020  IMPRESSION: 1. Right hydronephrosis of uncertain etiology, similar to prior CT. 2. Otherwise unremarkable. Us Transvaginal    Result Date: 1/5/2020  IMPRESSION: 1. 5.8 cm right ovarian cyst, probably hemorrhagic cyst, measured 7.3 cm previously. 2. 3.1 cm complex left ovarian cyst, probably hemorrhagic cyst, measures 3.4 cm previously. Us Pelv Non Obs    Result Date: 1/5/2020  IMPRESSION: 1. 5.8 cm right ovarian cyst, probably hemorrhagic cyst, measured 7.3 cm previously. 2. 3.1 cm complex left ovarian cyst, probably hemorrhagic cyst, measures 3.4 cm previously. [id number] COURSE:   HPI on presentation   58-year-old female with medical history significant for kidney stones, ovarian cyst status post laparoscopy in 2010, depression and bipolar presents to the hospital with chief complaint of sudden onset abdominal pain of 1 day duration.     Per patient she woke up the morning of her presentation and started to feel nauseous followed by vomiting. She then experienced intense bilateral lower abdominal colicky pain. Pain was so severe that patient needed to come to the hospital. She described the pain as sharp, and nonradiating with a scale of 7/10. Patient denies dysuria, urgency, frequency, fever, diarrhea, shortness of breath or other symptoms.     # Acute abdominal pain secondary to UTI with hydronephrosis vs hemorrhagic ovarian cyst  - Patient does not have significant urinary symptoms, wbc wnl , UA positive for infection vs colonization   - Pelvic US with bilateral hemorrhagic cyst   - Renal US Right hydronephrosis of uncertain etiology, similar to prior CT.  - Obgyn consulted for hemorrhagic ovarian cyst and no intervention needed. Suggested outpatient follow up   - Urine culture pending and she started on Ceftriaxone   - good pain control      # Substance abuse/tobacco use  - 3min education on smoking cessation     # Bipolar/depression  - not on medication      # Hypokalemia  - repleted      # Anemia- new     The following morning patient stated being pain free. No fever or other urinary symptoms. She asked to go home therefore discharged on oral antibiotic. DISCHARGE DIAGNOSES / PLAN:    Acute abdominal pain secondary to UTI with hydronephrosis vs hemorrhagic ovarian cyst  Substance abuse/Tobacco use   Bipolar/depression   Hypokalemia   Anemia- new    Complete oral antibiotic. Follow up with Obgyn and Iron supplement for anemia.    Smoke and Marijuana cessation     Patient Active Problem List   Diagnosis Code    Sickle cell trait ([facility]) D57.3    Anemia D64.9    Iron deficiency E61.1    History of depressed bipolar disorder ([facility] F31.70    H/O schizophrenia Z86.59    Myalgia M79.10    Perennial allergic rhinitis J30.89    Abscess L02.91    UTI (urinary tract infection) N39.0               PENDING TEST RESULTS:   At the time of discharge the following test results are still pending: Urine culture     FOLLOW UP APPOINTMENTS:    Follow-up Information     Follow up With Specialties Details Why Contact Info    Itz Arauz NP Nurse Practitioner Schedule an appointment as soon as possible for a visit in 2 weeks follow up  [place]  [address] [facility]  [address] [facility]  Schedule an appointment as soon as possible for a visit in 1 week Obgyn Follow up  [address]:   · It is important that you take the medication exactly as they are prescribed. · Keep your medication in the bottles provided by the pharmacist and keep a list of the medication names, dosages, and times to be taken in your wallet. · Do not take other medications without consulting your doctor. · No drinking alcohol or driving car or operating machinery if you are on narcotic pain medications. Donot take sedating mediations if you are sleepy or confused. · Fall Precautions  · Keep Well Hydrated  · Report to your medical provider if you feel you have  developed allergies to medications  · Follow up with your PCP or Consultant for medication adjustments and refills  · Monitor for signs of fevers,chills,bleeding,chest pain and seek medical attention if you do so. DIET: Regular     ACTIVITY: As tolerated     WOUND CARE: None     EQUIPMENT needed: None       DISCHARGE MEDICATIONS:  Current Discharge Medication List      START taking these medications    Details   ciprofloxacin HCl (CIPRO) 500 mg tablet Take 1 Tab by mouth two (2) times a day for 6 days.   Qty: 12 Tab, Refills: 0             All Micro Results     Procedure Component Value Units Date/Time    CULTURE, URINE [[id number]] Collected:  01/05/20 1307    Order Status:  Completed Specimen:  Urine from Clean catch Updated:  01/05/20 5470          Recent Results (from the past 24 hour(s))   CBC WITH AUTOMATED DIFF    Collection Time: 01/05/20 12:40 PM   Result Value Ref Range    WBC 5.6 3.6 - 11.0 K/uL    RBC 4.04 3.80 - 5.20 M/uL    HGB 9.8 (L) 11.5 - 16.0 g/dL    HCT 30.9 (L) 35.0 - 47.0 %    MCV 76.5 (L) 80.0 - 99.0 FL    MCH 24.3 (L) 26.0 - 34.0 PG    MCHC 31.7 30.0 - 36.5 g/dL    RDW 18.1 (H) 11.5 - 14.5 %    PLATELET 302 455 - 418 K/uL    MPV 12.0 8.9 - 12.9 FL    NRBC 0.0 0  WBC    ABSOLUTE NRBC 0. 00 0.00 - 0.01 K/uL    NEUTROPHILS 64 32 - 75 %    LYMPHOCYTES 21 12 - 49 %    MONOCYTES 7 5 - 13 %    EOSINOPHILS 7 0 - 7 %    BASOPHILS 1 0 - 1 %    IMMATURE GRANULOCYTES 0 0.0 - 0.5 %    ABS. NEUTROPHILS 3.7 1.8 - 8.0 K/UL    ABS. LYMPHOCYTES 1.2 0.8 - 3.5 K/UL    ABS. MONOCYTES 0.4 0.0 - 1.0 K/UL    ABS. EOSINOPHILS 0.4 0.0 - 0.4 K/UL    ABS. BASOPHILS 0.1 0.0 - 0.1 K/UL    ABS. IMM. GRANS. 0.0 0.00 - 0.04 K/UL    DF AUTOMATED     LIPASE    Collection Time: 01/05/20 12:40 PM   Result Value Ref Range    Lipase 71 (L) 73 - 393 U/L   SAMPLES BEING HELD    Collection Time: 01/05/20 12:40 PM   Result Value Ref Range    SAMPLES BEING HELD 1LAV 1BLUE     COMMENT        Add-on orders for these samples will be processed based on acceptable specimen integrity and analyte stability, which may vary by analyte. HCG QL SERUM    Collection Time: 01/05/20 12:40 PM   Result Value Ref Range    HCG, Ql. NEGATIVE  NEG     METABOLIC PANEL, COMPREHENSIVE    Collection Time: 01/05/20 12:40 PM   Result Value Ref Range    Sodium 142 136 - 145 mmol/L    Potassium 3.4 (L) 3.5 - 5.1 mmol/L    Chloride 108 97 - 108 mmol/L    CO2 28 21 - 32 mmol/L    Anion gap 6 5 - 15 mmol/L    Glucose 88 65 - 100 mg/dL    BUN 10 6 - 20 MG/DL    Creatinine 0.84 0.55 - 1.02 MG/DL    BUN/Creatinine ratio 12 12 - 20      GFR est AA >60 >60 ml/min/1.73m2    GFR est non-AA >60 >60 ml/min/1.73m2    Calcium 8.7 8.5 - 10.1 MG/DL    Bilirubin, total 0.4 0.2 - 1.0 MG/DL    ALT (SGPT) 14 12 - 78 U/L    AST (SGOT) 10 (L) 15 - 37 U/L    Alk.  phosphatase 70 45 - 117 U/L    Protein, total 7.5 6.4 - 8.2 g/dL    Albumin 3.8 3.5 - 5.0 g/dL    Globulin 3.7 2.0 - 4.0 g/dL    A-G Ratio 1.0 (L) 1.1 - 2.2     URINALYSIS W/ REFLEX CULTURE    Collection Time: 01/05/20  1:07 PM   Result Value Ref Range    Color YELLOW/STRAW      Appearance CLOUDY (A) CLEAR      Specific gravity 1.011 1.003 - 1.030      pH (UA) 6.0 5.0 - 8.0      Protein NEGATIVE  NEG mg/dL    Glucose NEGATIVE NEG mg/dL    Ketone NEGATIVE  NEG mg/dL    Bilirubin NEGATIVE  NEG      Blood SMALL (A) NEG      Urobilinogen 1.0 0.2 - 1.0 EU/dL    Nitrites POSITIVE (A) NEG      Leukocyte Esterase TRACE (A) NEG      UA:UC IF INDICATED URINE CULTURE ORDERED (A) CNI      WBC 20-50 0 - 4 /hpf    RBC 0-5 0 - 5 /hpf    Epithelial cells FEW FEW /lpf    Bacteria 4+ (A) NEG /hpf    Hyaline cast 2-5 0 - 5 /lpf   METABOLIC PANEL, BASIC    Collection Time: 01/06/20 12:46 AM   Result Value Ref Range    Sodium 140 136 - 145 mmol/L    Potassium 3.3 (L) 3.5 - 5.1 mmol/L    Chloride 110 (H) 97 - 108 mmol/L    CO2 25 21 - 32 mmol/L    Anion gap 5 5 - 15 mmol/L    Glucose 83 65 - 100 mg/dL    BUN 10 6 - 20 MG/DL    Creatinine 0.75 0.55 - 1.02 MG/DL    BUN/Creatinine ratio 13 12 - 20      GFR est AA >60 >60 ml/min/1.73m2    GFR est non-AA >60 >60 ml/min/1.73m2    Calcium 8.0 (L) 8.5 - 10.1 MG/DL   CBC W/O DIFF    Collection Time: 01/06/20 12:46 AM   Result Value Ref Range    WBC 5.6 3.6 - 11.0 K/uL    RBC 3.54 (L) 3.80 - 5.20 M/uL    HGB 8.7 (L) 11.5 - 16.0 g/dL    HCT 26.5 (L) 35.0 - 47.0 %    MCV 74.9 (L) 80.0 - 99.0 FL    MCH 24.6 (L) 26.0 - 34.0 PG    MCHC 32.8 30.0 - 36.5 g/dL    RDW 18.0 (H) 11.5 - 14.5 %    PLATELET 388 983 - 654 K/uL    MPV 11.6 8.9 - 12.9 FL    NRBC 0.0 0  WBC    ABSOLUTE NRBC 0.00 0.00 - 0.01 K/uL           NOTIFY YOUR PHYSICIAN FOR ANY OF THE FOLLOWING:   Fever over 101 degrees for 24 hours. Chest pain, shortness of breath, fever, chills, nausea, vomiting, diarrhea, change in mentation, falling, weakness, bleeding. Severe pain or pain not relieved by medications. Or, any other signs or symptoms that you may have questions about.     DISPOSITION:  x  Home With:   OT  PT  WILIAM  RN       Long term SNF/Inpatient Rehab    Independent/assisted living    Hospice    Other:       PATIENT CONDITION AT DISCHARGE:     Functional status    Poor     Deconditioned    x Independent      Cognition     Lucid     Forgetful Dementia      Catheters/lines (plus indication)    [place]     PICC     PEG    x None      Code status    x Full code     DNR      PHYSICAL EXAMINATION AT DISCHARGE:  Gen:  No distress, alert  HEENT:  Normal cephalic atraumatic, extra-occular movements are intact. Neck:  Supple, No JVD  Lungs:  Clear bilaterally, no wheeze, no rales, normal effort  Heart:  Regular Rate and Rhythm, normal S1 and S2, no edema  Abdomen:  Soft, non tender, normal bowel sounds, no guarding.   Extremities:  Well perfused, no cyanosis or edema  Neurological:  Awake and alert, CN's are intact, normal strength throughout extremities  Skin:  No rashes or moles  Mental Status:  Normal thought process, does not appear anxious      CHRONIC MEDICAL DIAGNOSES:  Problem List as of 1/6/2020 Date Reviewed: 3/21/2017          Codes Class Noted - Resolved    UTI (urinary tract infection) ICD-10-CM: N39.0  ICD-9-CM: 599.0  1/5/2020 - Present        Abscess ICD-10-CM: L02.91  ICD-9-CM: 682.9  12/1/2016 - Present        History of depressed bipolar disorder ([facility] ICD-10-CM: F31.70  ICD-9-CM: 296.56  11/23/2016 - Present        H/O schizophrenia ICD-10-CM: Z86.59  ICD-9-CM: V11.0  11/23/2016 - Present        Myalgia ICD-10-CM: M79.10  ICD-9-CM: 729.1  11/23/2016 - Present        Perennial allergic rhinitis ICD-10-CM: J30.89  ICD-9-CM: 477.8  11/23/2016 - Present        Iron deficiency ICD-10-CM: E61.1  ICD-9-CM: 280.9  9/30/2015 - Present        Anemia ICD-10-CM: D64.9  ICD-9-CM: 285.9  1/10/2013 - Present        Sickle cell trait ([facility] (Chronic) ICD-10-CM: D57.3  ICD-9-CM: 282.5  11/30/2012 - Present        RESOLVED: Hypokalemia ICD-10-CM: E87.6  ICD-9-CM: 276.8  9/30/2015 - 10/6/2016        RESOLVED: Elevated lipase ICD-10-CM: R74.8  ICD-9-CM: 790.5  9/27/2015 - 10/6/2016        RESOLVED: Shingles rash ICD-10-CM: B02.9  ICD-9-CM: 053.9  7/9/2013 - 10/6/2016        RESOLVED: Nausea ICD-10-CM: R11.0  ICD-9-CM: 787.02  1/24/2013 - 10/6/2016        RESOLVED: Anemia, unspecified ICD-10-CM: D64.9  ICD-9-CM: 285.9  1/24/2013 - 10/6/2016        RESOLVED: Anemia, unspecified ICD-10-CM: D64.9  ICD-9-CM: 285.9  1/24/2013 - 10/6/2016        RESOLVED: Constipation ICD-10-CM: K59.00  ICD-9-CM: 564.00  1/10/2013 - 11/23/2016        RESOLVED: Hydronephrosis of left kidney ICD-10-CM: N13.30  ICD-9-CM: 697  11/30/2012 - 10/6/2016        RESOLVED: Anemia, unspecified ICD-10-CM: D64.9  ICD-9-CM: 285.9  11/30/2012 - 10/6/2016        RESOLVED: Gross hematuria ICD-10-CM: R31.0  ICD-9-CM: 599.71  10/25/2012 - 10/6/2016        RESOLVED: Renal stone ICD-10-CM: N20.0  ICD-9-CM: 592.0  10/24/2012 - 10/6/2016        RESOLVED: IWSVKZFI(187.4) ICD-10-CM: R51  ICD-9-CM: 784.0  10/24/2012 - 10/6/2016        RESOLVED: UTI (lower urinary tract infection) ICD-10-CM: N39.0  ICD-9-CM: 599.0  10/24/2012 - 10/6/2016        RESOLVED: Ovarian cyst ICD-10-CM: G34.871  ICD-9-CM: 620.2  10/24/2012 - 10/6/2016        RESOLVED: UTI (lower urinary tract infection) ICD-10-CM: N39.0  ICD-9-CM: 599.0  10/15/2012 - 10/16/2012                Discussed with patient and family. Explained the importance of following up, Compliance with medications and recommendations on discharge,Side effect profile of medications were explained. Safety precautions at home and while taking pain medications also explained. All questions answered to the satisfaction of the patient/family. Discussed with consultant(s) who are agreeable to the discharge. Verbal and written instructions on discharge given. Explained about Discharge medications and side effect profile. Advised patient/family to followup with their pcp for medication refills and preauthorization of medications, Home health orders. checkups,screenign programs as appropriate for age. Thank you Imelda Simmons NP for taking care of your patient, Please call with any questions.       Greater than 35 minutes were spent with the patient on counseling and coordination of care    Signed:   Tonya Kebede MD  1/6/2020  9:32 AM

## 2020-01-06 NOTE — DISCHARGE INSTRUCTIONS
Dear Heath Verduzco,    Thank you for choosing [address] [place] [address] for your healthcare needs. We strive to provide EXCELLENT care to you and your family. In an effort to explain clearly why you were here in the hospital, I've also written a very brief summary below. Other details including formal diagnosis, medication changes, and follow up appointment recommendations can also be found in this packet. You were admitted for Acute abdominal pain secondary to Urinary tract infection with hydronephrosis and hemorrhagic ovarian cyst    You also received care from specialist physicians in the following specialties:  [address] [place] [address] HOSPITALIST      Remember that it is important for you to take your medications exactly as they are prescribed. It is helpful to keep a list of your medication with the names, dosages, and times to be taken in your wallet. Additionally,   - Diet: Regular     Make sure to also see your primary care doctor for follow-up. Bring these papers with you and be sure to review your medication list with your doctor. I cannot stress the importance of follow up enough. I've included the information for your follow-up appointments below: Follow-up Information     Follow up With Specialties Details Why Contact Raulito Hammond NP Nurse Practitioner Schedule an appointment as soon as possible for a visit in 2 weeks follow up  [facility]  [address] [place] [address] For Women  Schedule an appointment as soon as possible for a visit in 1 week Obgyn Follow up  [address]          At this time, the following test results are still pending: None    Again, please follow-up these results with your primary care provider.     Should you have any fever over 101 degrees for 24 hours, chest pain, shortness of breath, fever, chills, nausea, vomiting, diarrhea, change in mentation, falling, weakness, bleeding, or worsening pain, please seek medical attention immediately. Finally, as your discharging physician, you may be receiving a survey regarding my care. I would greatly value and appreciate your input in the survey as we strive for excellence. If you have any questions, I can be reached at [phone]. Thank you so much again for allowing me to care for you at [address] [place] [address].    Respectfully yours,  Jamal Aviles MD     I have reviewed discharge instructions with the patient. The patient verbalized understanding. Patient requested pain medication prescription for home; this nurse contacted Dr. Nestor Duenas who stated for patient to take NSAID or Tylenol and that she was unable to prescribe narcotic. Patient notified. [employer]-sam[employer]anc[employer] Activation    Thank you for requesting access to Neura. Please follow the instructions below to securely access and download your online medical record. Neura allows you to send messages to your doctor, view your test results, renew your prescriptions, schedule appointments, and more. How Do I Sign Up? 1. In your internet browser, go to [email or web address]  2. Click on the First Time User? Click Here link in the Sign In box. You will be redirect to the New Member Sign Up page. 3. Enter your Neura Access Code exactly as it appears below. You will not need to use this code after youve completed the sign-up process. If you do not sign up before the expiration date, you must request a new code. Neura Access Code: [email or web address]  Expires: 2020 12:24 PM (This is the date your Neura access code will )    4. Enter the last four digits of your Social Security Number (xxxx) and Date of Birth (mm/dd/yyyy) as indicated and click Submit. You will be taken to the next sign-up page. 5. Create a Neura ID.  This will be your Neura login ID and cannot be changed, so think of one that is secure and easy to remember. 6. Create a LoggedIn password. You can change your password at any time. 7. Enter your Password Reset Question and Answer. This can be used at a later time if you forget your password. 8. Enter your e-mail address. You will receive e-mail notification when new information is available in [address] 9. Click Sign Up. You can now view and download portions of your medical record. 10. Click the Download Summary menu link to download a portable copy of your medical information. Additional Information    If you have questions, please visit the Frequently Asked Questions section of the LoggedIn website at [email or web address] Remember, LoggedIn is NOT to be used for urgent needs. For medical emergencies, dial 911.

## 2020-01-06 NOTE — PROGRESS NOTES
Transition of Care Plan:       -Home with family  -OBS notice given, placed signed copy on chart.  -Friend will transport at discharge. Observation notice provided in writing to patient and/or caregiver as well as verbal explanation of the policy. Patients who are in outpatient status also receive the Observation notice. Reason for Admission:   Patient in house for abdominal pain. Patient is alert & oriented. Independent uses no assisting devices. Patient lives with four daughter. Patient is on room air no home oxygen. Patient drives private vehicle. Friend in the room will transport at discharge. Patient confirmed PCP Dr. Reece Chaparro. Patient uses [facility] [address] [place] [address]. RRAT Score:      6/low               Plan for utilizing home health:      No plan for home health                     Current Advanced Directive/Advance Care Plan:  Not on file, provided education, not interested at this time. Care Management Interventions  PCP Verified by CM: Yes  Mode of Transport at Discharge: Other (see comment)(Friend will transport )  Transition of Care Consult (CM Consult): Other(Home/no needs)  Discharge Durable Medical Equipment: No  Physical Therapy Consult: No  Occupational Therapy Consult: No  Speech Therapy Consult: No  Current Support Network:  Other(Lives w/4 daughters)  Confirm Follow Up Transport: Self  Discharge Location  Discharge Placement: Home with family assistance      CM will follow     Rj Dickinson, NIXON/DAYA

## 2020-01-06 NOTE — PROGRESS NOTES
Hospital follow-up PCP transitional care appointment has been scheduled with Dr. Phillip Urena, NP for Monday, 1/13/20 at 10:30 a.m. Pending patient discharge.   Kristy De Leon, Care Management Specialist.

## 2020-02-03 ENCOUNTER — OFFICE VISIT (OUTPATIENT)
Dept: INTERNAL MEDICINE CLINIC | Age: 35
End: 2020-02-03

## 2020-02-03 VITALS
SYSTOLIC BLOOD PRESSURE: 130 MMHG | TEMPERATURE: 97.7 F | BODY MASS INDEX: 24.17 KG/M2 | OXYGEN SATURATION: 99 % | DIASTOLIC BLOOD PRESSURE: 89 MMHG | RESPIRATION RATE: 18 BRPM | WEIGHT: 154 LBS | HEART RATE: 88 BPM | HEIGHT: 67 IN

## 2020-02-03 DIAGNOSIS — R11.2 NAUSEA AND VOMITING, INTRACTABILITY OF VOMITING NOT SPECIFIED, UNSPECIFIED VOMITING TYPE: ICD-10-CM

## 2020-02-03 DIAGNOSIS — N83.291 COMPLEX CYST OF RIGHT OVARY: Primary | ICD-10-CM

## 2020-02-03 DIAGNOSIS — J30.89 ENVIRONMENTAL AND SEASONAL ALLERGIES: ICD-10-CM

## 2020-02-03 PROBLEM — R10.2 PAIN IN PELVIS: Status: ACTIVE | Noted: 2019-07-20

## 2020-02-03 RX ORDER — ONDANSETRON 4 MG/1
4 TABLET, ORALLY DISINTEGRATING ORAL
Qty: 40 TAB | Refills: 2 | Status: SHIPPED | OUTPATIENT
Start: 2020-02-03 | End: 2020-07-29

## 2020-02-03 RX ORDER — MINERAL OIL
180 ENEMA (ML) RECTAL
Qty: 30 TAB | Refills: 2 | Status: SHIPPED | OUTPATIENT
Start: 2020-02-03 | End: 2020-07-29

## 2020-02-03 RX ORDER — KETOROLAC TROMETHAMINE 10 MG/1
10 TABLET, FILM COATED ORAL
COMMUNITY
Start: 2020-01-30 | End: 2020-08-17

## 2020-02-03 RX ORDER — ONDANSETRON 4 MG/1
4 TABLET, ORALLY DISINTEGRATING ORAL
COMMUNITY
Start: 2020-01-30 | End: 2020-02-03 | Stop reason: SDUPTHER

## 2020-02-03 NOTE — PROGRESS NOTES
Pt Is here for   Chief Complaint   Patient presents with   [facility] Follow Up     for UTI 1/5/2020 [facility] and 1/30/2020 and [facility] for cyst on ovaries, pt states that she's been told that one of her fallopian tube is swollen.  Vomiting     pt states that she's been vomiting      Pt states pain level is a 8/10 pelvic area    1. Have you been to the ER, urgent care clinic since your last visit? Hospitalized since your last visit? Yes When: 1/5/2020 and 1/30/2020 [facility] and [facility], for pain and vomiting and UTI    2. Have you seen or consulted any other health care providers outside of the [address] [place] [address] since your last visit? Include any pap smears or colon screening.  No

## 2020-02-03 NOTE — PATIENT INSTRUCTIONS
Hemorrhagic Ovarian Cyst: Care Instructions  Your Care Instructions    Sometimes a sac forms on the surface of a woman's ovary. When the sac swells up with fluid, it forms a cyst. If the cyst bleeds, it is called a hemorrhagic (say \"Dotty\") ovarian cyst. If the cyst breaks open, blood and fluid spill out into the lower belly and pelvis. You may not have symptoms from the cyst. But if it is large, or if it twists or breaks open, you may have pain or other problems. You may feel pain from the cyst or have symptoms from losing blood. Your doctor may use a pelvic ultrasound to see if you have a cyst. Blood tests can help your doctor tell if the cyst is bleeding or you have lost a lot of blood. Treatment depends on your symptoms. If they are mild, your doctor may suggest carefully watching your symptoms and doing blood tests again. But if you have a cyst that is very large, bleeds a lot, or causes other problems, your doctor may suggest surgery to remove it. If the bleeding is heavy, you may also need treatment to replace the blood. Follow-up care is a key part of your treatment and safety. Be sure to make and go to all appointments, and call your doctor if you are having problems. It's also a good idea to know your test results and keep a list of the medicines you take. How can you care for yourself at home? · Use heat, such as a warm water bottle, a heating pad set on low, or a warm bath, to relax tense muscles and relieve cramping. · Be safe with medicines. Read and follow all instructions on the label. ? If the doctor gave you a prescription medicine for pain, take it as prescribed. ? If you are not taking a prescription pain medicine, ask your doctor if you can take an over-the-counter medicine. When should you call for help? Call 911 anytime you think you may need emergency care.  For example, call if:    · You passed out (lost consciousness).    Call your doctor now or seek immediate medical care if:    · You have severe vaginal bleeding.     · You are dizzy or lightheaded, or you feel like you may faint.     · You have new or worse pain in your belly or pelvis.    Watch closely for changes in your health, and be sure to contact your doctor if:    · You think you may be pregnant.     · You do not get better as expected. Where can you learn more? Go to http://roula-susan.info/. Enter D256 in the search box to learn more about \"Hemorrhagic Ovarian Cyst: Care Instructions. \"  Current as of: February 19, 2019  Content Version: 12.2  © 0278-2968 [employer]. Care instructions adapted under license by [employer] (which disclaims liability or warranty for this information). If you have questions about a medical condition or this instruction, always ask your healthcare professional. [address] any warranty or liability for your use of this information.

## 2020-02-03 NOTE — PROGRESS NOTES
Antonieta Styles is a 27 y.o. female and presents with Hospital Follow Up (for UTI 1/5/2020 [facility] and 1/30/2020 and [facility] for cyst on ovaries, pt states that she's been told that one of her fallopian tube is swollen. ) and Vomiting (pt states that she's been vomiting )    Subjective:  Pt is here for hospital follow-up transition of care from 1/5 to 1/6 for abdominal pain and vomiting. Contacted or attempted contact within two days of d/c by NN. Diagnosed with UTI and hemorrhagic ovarian cyst. Was given zofran, GYN consult, and ultrasound. Instructed to schedule appt with PCP & GYN, pt reported to neither appt. Reports feeling SAME AS when in hospital, seen in ER recently for similar symptoms. Seemingly occurring around menses. Decreased appetite and nausea, interested in having ovaries removed to avoid symptoms again. Wants to see GYN now. Pain Scale: 8/10.     Review of Systems  Constitutional: negative for fevers, chills, anorexia and weight loss  Respiratory:  negative for cough, hemoptysis, dyspnea, and wheezing  CV:   negative for chest pain, palpitations, and lower extremity edema  GI:   negative for vomiting, diarrhea,and melena  Endo:               negative for polyuria,polydipsia,polyphagia, and heat intolerance  Genitourinary: negative for dysuria & retention  Integument:  negative for rash, ulcerations, and pruritus  Hematologic:  negative for easy bruising and bleeding  Musculoskel: negative for arthralgias, muscle weakness,and joint pain/swelling  Neurological:  negative for headaches, dizziness, vertigo,and memory/gait problems  Behavl/Psych: negative for feelings of anxiety, depression, suicide, and mood changes    Past Medical History:   Diagnosis Date    Abscess 12/1/2016    Anemia NEC     Bipolar 1 disorder ([facility] [address]     Breast tenderness     bilateral, since 11/2016, on and off     Depression     Dizziness     Gastrointestinal disorder     pancreatitis    Increased frequency of headaches     Kidney stones 10/2012    Nausea     Nipple discharge 2009    white/clear discharge since birth of child     Other ill-defined conditions(799.89)     sickle cell trait    Other ill-defined conditions(799.89)     anemia    Psychiatric disorder     depression, bi-polar    Psychotic disorder ([facility] [address]     bipolar 1    Sickle cell trait ([facility] [address]     Sickle cell trait ([facility] [address]     Stomach pain      Past Surgical History:   Procedure Laterality Date    HX GYN      tubaligation    HX GYN      ectopic pregnancy surgery    HX OTHER SURGICAL      hx of blood transfusions     Social History     Socioeconomic History    Marital status: SINGLE     Spouse name: Not on file    Number of children: Not on file    Years of education: Not on file    Highest education level: Not on file   Tobacco Use    Smoking status: Current Every Day Smoker     Packs/day: 0.25     Years: 9.00     Pack years: 2.25    Smokeless tobacco: Never Used   Substance and Sexual Activity    Alcohol use: No    Drug use: No     Types: Marijuana     Comment: occasional    Sexual activity: Yes     Partners: Male     Birth control/protection: None     Family History   Problem Relation Age of Onset    Heart Disease Father      Current Outpatient Medications   Medication Sig Dispense Refill    ondansetron (ZOFRAN ODT) 4 mg disintegrating tablet Take 1 Tab by mouth every eight (8) hours as needed for Nausea or Vomiting. 40 Tab 2    fexofenadine (ALLEGRA) 180 mg tablet Take 1 Tab by mouth daily as needed for Allergies (ear fullness). Indications: hives 30 Tab 2    ketorolac (TORADOL) 10 mg tablet 10 mg.        Allergies   Allergen Reactions    Latex Itching    Cetirizine Vertigo     Other reaction(s): Bradycardia    Tramadol Vertigo    Naproxen Vertigo       Objective:  Visit Vitals  /89 (BP 1 Location: Left arm, BP Patient Position: Sitting)   Pulse 88   Temp 97.7 °F (36.5 °C) (Oral)   Resp 18   Ht 5' 7\" (1.702 m)   Wt 154 lb (69.9 kg)   LMP 02/03/2020   SpO2 99%   BMI 24.12 kg/m²     Wt Readings from Last 3 Encounters:   02/03/20 154 lb (69.9 kg)   01/05/20 153 lb (69.4 kg)   07/09/19 148 lb 12.8 oz (67.5 kg)     Physical Exam:   General appearance - alert, well appearing, and in no distress. Mental status - A/O x 4, normal mood and affect. Ears- TM injected ARCHANA. no erythema or drainage. Neck -Supple ,normal CSP. FROM, non-tender. No significant adenopathy/thyromegaly. No JVD. Chest -  Symmetric chest rise. No wheezing, rales or rhonchi. Heart - Normal rate. Abdomen - Soft,non-distended. No pulsatile mass noted. Ext- Radial, DP pulses, 2+ bilaterally. No pedal edema, clubbing, or cyanosis. Skin-Warm and dry. No hyperpigmentation, ulcerations, or suspicious lesions. Neuro - Normal speech, no focal findings or movement disorder. Normal strength, gait, and muscle tone. Assessment/Plan:  Referred to GYN for ovarian cyst concerned. Refilled Zofran and encouraged eating. Changed antihistamine to allegra since zyrtec with SE and feels claritin not helpful. Medication Side Effects and Warnings were discussed with patient: yes   Patient Labs were reviewed: yes  Patient Past Records were reviewed: yes    See below for other orders   Follow-up and Dispositions    · Return in about 2 months (around 4/3/2020) for annual labs. ICD-10-CM ICD-9-CM    1. Complex cyst of right ovary N83.291 620.2 ondansetron (ZOFRAN ODT) 4 mg disintegrating tablet      REFERRAL TO GYNECOLOGY   2. Nausea and vomiting, intractability of vomiting not specified, unspecified vomiting type R11.2 787.01 ondansetron (ZOFRAN ODT) 4 mg disintegrating tablet   3.  Environmental and seasonal allergies J30.89 477.8 fexofenadine (ALLEGRA) 180 mg tablet     Orders Placed This Encounter    REFERRAL TO GYNECOLOGY     Referral Priority:   Routine     Referral Type:   Consultation     Referral Reason:   Specialty Services Required     Referred to Provider: Viky Cobos MD     Number of Visits Requested:   1    ketorolac (TORADOL) 10 mg tablet     Sig: 10 mg.    DISCONTD: ondansetron (ZOFRAN ODT) 4 mg disintegrating tablet     Si mg.  ondansetron (ZOFRAN ODT) 4 mg disintegrating tablet     Sig: Take 1 Tab by mouth every eight (8) hours as needed for Nausea or Vomiting. Dispense:  40 Tab     Refill:  2    fexofenadine (ALLEGRA) 180 mg tablet     Sig: Take 1 Tab by mouth daily as needed for Allergies (ear fullness). Indications: hives     Dispense:  30 Tab     Refill:  2       Cecilio Wei expressed understanding of plan. An After Visit Summary was offered/printed and given to the patient.

## 2020-02-19 ENCOUNTER — OFFICE VISIT (OUTPATIENT)
Dept: OBGYN CLINIC | Age: 35
End: 2020-02-19

## 2020-02-19 VITALS
BODY MASS INDEX: 24.8 KG/M2 | DIASTOLIC BLOOD PRESSURE: 82 MMHG | SYSTOLIC BLOOD PRESSURE: 127 MMHG | WEIGHT: 158 LBS | OXYGEN SATURATION: 99 % | HEIGHT: 67 IN | HEART RATE: 84 BPM | RESPIRATION RATE: 16 BRPM | TEMPERATURE: 96.6 F

## 2020-02-19 DIAGNOSIS — K59.00 CONSTIPATION, UNSPECIFIED CONSTIPATION TYPE: ICD-10-CM

## 2020-02-19 DIAGNOSIS — N83.201 CYSTS OF BOTH OVARIES: Primary | ICD-10-CM

## 2020-02-19 DIAGNOSIS — N80.9 ENDOMETRIOSIS: ICD-10-CM

## 2020-02-19 DIAGNOSIS — N83.202 CYSTS OF BOTH OVARIES: Primary | ICD-10-CM

## 2020-02-19 DIAGNOSIS — R10.2 PELVIC PAIN: ICD-10-CM

## 2020-02-19 RX ORDER — BISACODYL 5 MG
10 TABLET, DELAYED RELEASE (ENTERIC COATED) ORAL
Qty: 14 TAB | Refills: 2 | Status: SHIPPED | OUTPATIENT
Start: 2020-02-19 | End: 2020-08-06

## 2020-02-19 RX ORDER — DOCUSATE SODIUM 100 MG/1
100 CAPSULE, LIQUID FILLED ORAL 2 TIMES DAILY
Qty: 60 CAP | Refills: 12 | Status: ON HOLD | OUTPATIENT
Start: 2020-02-19 | End: 2020-08-25 | Stop reason: SDUPTHER

## 2020-02-19 RX ORDER — NORETHINDRONE 5 MG/1
5 TABLET ORAL DAILY
Qty: 30 TAB | Refills: 12 | Status: SHIPPED | OUTPATIENT
Start: 2020-02-19 | End: 2020-07-29

## 2020-02-19 RX ORDER — NAPROXEN 500 MG/1
500 TABLET ORAL 2 TIMES DAILY WITH MEALS
Qty: 60 TAB | Refills: 2 | Status: SHIPPED | OUTPATIENT
Start: 2020-02-19 | End: 2020-08-17

## 2020-02-19 NOTE — PATIENT INSTRUCTIONS
Endometriosis: Care Instructions  Your Care Instructions    Cells that are like the cells that line the inside your womb (uterus) sometimes grow on the outside of the uterus. This is called endometriosis. These clumps of cells can cause pain and problems with your periods. They can become inflamed and may bleed. Scar tissue that forms over time can make it difficult to get pregnant. Medicines and sometimes surgery can relieve pain and help women who want to get pregnant. Follow-up care is a key part of your treatment and safety. Be sure to make and go to all appointments, and call your doctor if you are having problems. It's also a good idea to know your test results and keep a list of the medicines you take. How can you care for yourself at home? · Take your medicines exactly as prescribed. Call your doctor if you think you are having a problem with your medicine. · Take pain medicines exactly as directed. ? If the doctor gave you a prescription medicine for pain, take it as prescribed. ? If you are not taking a prescription pain medicine, ask your doctor if you can take an over-the-counter medicine. · Apply heat, such as a hot water bottle or a heating pad set on low, to your lower belly. Or take a warm bath. Heat may relieve pain. · Lie down and put a pillow under your knees to raise your legs. This will relieve pressure on your back. When should you call for help? Call your doctor now or seek immediate medical care if:    · You have severe vaginal bleeding.     · You have new or worse pain in your belly or pelvis.    Watch closely for changes in your health, and be sure to contact your doctor if:    · You have unusual vaginal bleeding.     · You do not get better as expected. Where can you learn more? Go to http://roula-susan.info/. Enter D955 in the search box to learn more about \"Endometriosis: Care Instructions. \"  Current as of: February 19, 2019  Content Version: 12.2  © 7117-8769 Healthwise, [employer]. Care instructions adapted under license by [employer] (which disclaims liability or warranty for this information). If you have questions about a medical condition or this instruction, always ask your healthcare professional. [address] any warranty or liability for your use of this information. Constipation: Care Instructions  Your Care Instructions    Constipation means that you have a hard time passing stools (bowel movements). People pass stools from 3 times a day to once every 3 days. What is normal for you may be different. Constipation may occur with pain in the rectum and cramping. The pain may get worse when you try to pass stools. Sometimes there are small amounts of bright red blood on toilet paper or the surface of stools. This is because of enlarged veins near the rectum (hemorrhoids). A few changes in your diet and lifestyle may help you avoid ongoing constipation. Your doctor may also prescribe medicine to help loosen your stool. Some medicines can cause constipation. These include pain medicines and antidepressants. Tell your doctor about all the medicines you take. Your doctor may want to make a medicine change to ease your symptoms. Follow-up care is a key part of your treatment and safety. Be sure to make and go to all appointments, and call your doctor if you are having problems. It's also a good idea to know your test results and keep a list of the medicines you take. How can you care for yourself at home? · Drink plenty of fluids, enough so that your urine is light yellow or clear like water. If you have kidney, heart, or liver disease and have to limit fluids, talk with your doctor before you increase the amount of fluids you drink. · Include high-fiber foods in your diet each day. These include fruits, vegetables, beans, and whole grains.   · Get at least 30 minutes of exercise on most days of the week. Walking is a good choice. You also may want to do other activities, such as running, swimming, cycling, or playing tennis or team sports. · Take a fiber supplement, such as Citrucel or Metamucil, every day. Read and follow all instructions on the label. · Schedule time each day for a bowel movement. A daily routine may help. Take your time having your bowel movement. · Support your feet with a small step stool when you sit on the toilet. This helps flex your hips and places your pelvis in a squatting position. · Your doctor may recommend an over-the-counter laxative to relieve your constipation. Examples are Milk of Magnesia and MiraLax. Read and follow all instructions on the label. Do not use laxatives on a long-term basis. When should you call for help? Call your doctor now or seek immediate medical care if:    · You have new or worse belly pain.     · You have new or worse nausea or vomiting.     · You have blood in your stools.    Watch closely for changes in your health, and be sure to contact your doctor if:    · Your constipation is getting worse.     · You do not get better as expected. Where can you learn more? Go to http://roula-susan.info/. Enter 21 [id number] in the search box to learn more about \"Constipation: Care Instructions. \"  Current as of: June 26, 2019  Content Version: 12.2  © 2392-8105 [employer]. Care instructions adapted under license by [employer] (which disclaims liability or warranty for this information). If you have questions about a medical condition or this instruction, always ask your healthcare professional. [place] [address] any warranty or liability for your use of this information. Hemorrhagic Ovarian Cyst: Care Instructions  Your Care Instructions    Sometimes a sac forms on the surface of a woman's ovary.  When the sac swells up with fluid, it forms a cyst. If the cyst bleeds, it is called a hemorrhagic (say \"bdf-cmg-BQ-isauro\") ovarian cyst. If the cyst breaks open, blood and fluid spill out into the lower belly and pelvis. You may not have symptoms from the cyst. But if it is large, or if it twists or breaks open, you may have pain or other problems. You may feel pain from the cyst or have symptoms from losing blood. Your doctor may use a pelvic ultrasound to see if you have a cyst. Blood tests can help your doctor tell if the cyst is bleeding or you have lost a lot of blood. Treatment depends on your symptoms. If they are mild, your doctor may suggest carefully watching your symptoms and doing blood tests again. But if you have a cyst that is very large, bleeds a lot, or causes other problems, your doctor may suggest surgery to remove it. If the bleeding is heavy, you may also need treatment to replace the blood. Follow-up care is a key part of your treatment and safety. Be sure to make and go to all appointments, and call your doctor if you are having problems. It's also a good idea to know your test results and keep a list of the medicines you take. How can you care for yourself at home? · Use heat, such as a warm water bottle, a heating pad set on low, or a warm bath, to relax tense muscles and relieve cramping. · Be safe with medicines. Read and follow all instructions on the label. ? If the doctor gave you a prescription medicine for pain, take it as prescribed. ? If you are not taking a prescription pain medicine, ask your doctor if you can take an over-the-counter medicine. When should you call for help? Call 911 anytime you think you may need emergency care.  For example, call if:    · You passed out (lost consciousness).    Call your doctor now or seek immediate medical care if:    · You have severe vaginal bleeding.     · You are dizzy or lightheaded, or you feel like you may faint.     · You have new or worse pain in your belly or pelvis.    Watch closely for changes in your health, and be sure to contact your doctor if:    · You think you may be pregnant.     · You do not get better as expected. Where can you learn more? Go to http://roula-susan.info/. Enter D256 in the search box to learn more about \"Hemorrhagic Ovarian Cyst: Care Instructions. \"  Current as of: February 19, 2019  Content Version: 12.2  © 3494-4934 [employer]. Care instructions adapted under license by [employer] (which disclaims liability or warranty for this information). If you have questions about a medical condition or this instruction, always ask your healthcare professional. [address] any warranty or liability for your use of this information.

## 2020-02-19 NOTE — PROGRESS NOTES
Ovarian cyst evaluation    Chief Complaint   Ovarian Cyst   Patient's last menstrual period was 2020 (exact date). Emiliana Ku [facility]    Adnexal Mass History:  27 y.o. female presents with bilateral ovarian cysts. First noted on ultrasound 2019:  1. Large complex right ovarian cyst likely hemorrhagic cyst, 7.3 cm. Preserved  blood flow to the right ovary. 2. Complex left ovarian cyst likely hemorrhagic cyst, 3.4 cm. Preserved blood  flow to the left ovary. Repeat ultrasound on 2020 showed decrease in size:  IMPRESSION:   1. 5.8 cm right ovarian cyst, probably hemorrhagic cyst, measured 7.3 cm  previously. 2. 3.1 cm complex left ovarian cyst, probably hemorrhagic cyst, measures 3.4 cm  Previously. Patient has a hx of cysts, on ovaries. Also with hx of kidney stones and stent. Patient with pain with sex in general, not in specific spot. She has been diagnosed with a UTI in the ED, has right hydroureter, a history of ureteral stricture with a stent that fell out but she never followed up. Currently:    Patient reports pain for the last 3 months. Associated symptoms were nausea, pain, and difficulty moving bowels. Pain is worse with cycle, where it stays. When off cycle, it is intermittent. Also pain with sex, which has been going on for a while. Nausea is constant. Has to take nausea pills every day. Moves bowels about once/week. Has been drinking prune juice. When she moves bowels, her baseline pain improves. Patient reports she can feel the cyst on the right side. Periods are painful and irregular.  - TSVD x4, tested to 6lb 13oz, 4 children - 10, 12, 14, 16   No abnormal paps or STDs. Due for pap smear.     Current Method of Contraception is: BTL        Past Medical History:   Diagnosis Date    Abscess 2016    Anemia NEC     [facility] [address]     Breast tenderness     bilateral, since 2016, on and off     Depression     Dizziness     Gastrointestinal disorder     pancreatitis    Increased frequency of headaches     Kidney stones 10/2012    Nausea     Nipple discharge 2009    white/clear discharge since birth of child     Other ill-defined conditions(799.89)     sickle cell trait    Other ill-defined conditions(799.89)     anemia    Psychiatric disorder     depression, bi-polar    Psychotic disorder ([facility] [address]     bipolar 1    Sickle cell trait ([facility] [address]     Sickle cell trait ([facility] [address]     Stomach pain      Past Surgical History:   Procedure Laterality Date    HX GYN      tubaligation    HX GYN      ectopic pregnancy surgery    HX OTHER SURGICAL      hx of blood transfusions     Social History     Occupational History    Not on file   Tobacco Use    Smoking status: Current Every Day Smoker     Packs/day: 0.25     Years: 9.00     Pack years: 2.25    Smokeless tobacco: Never Used   Substance and Sexual Activity    Alcohol use: No    Drug use: No     Types: Marijuana     Comment: occasional    Sexual activity: Yes     Partners: Male     Birth control/protection: None     Family History   Problem Relation Age of Onset    Heart Disease Father        Allergies   Allergen Reactions    Latex Itching    Cetirizine Vertigo     Other reaction(s): Bradycardia    Tramadol Vertigo    Naproxen Vertigo     Prior to Admission medications    Medication Sig Start Date End Date Taking? Authorizing Provider   ketorolac (TORADOL) 10 mg tablet 10 mg. 1/30/20  Yes Provider, Historical   ondansetron (ZOFRAN ODT) 4 mg disintegrating tablet Take 1 Tab by mouth every eight (8) hours as needed for Nausea or Vomiting. 2/3/20  Yes Tonja Mejia NP   fexofenadine (ALLEGRA) 180 mg tablet Take 1 Tab by mouth daily as needed for Allergies (ear fullness).  Indications: hives 2/3/20  Yes Bennie Mcdaniel NP        Review of Systems - History obtained from the patient  Negative for:   GI: nausea, vomiting, diarrhea, blood in stools  : see HPI  Skin: negative for rash, hives  Endocrine: negative for polyuria, polydypsia  Heme/lymph: negative for bleeding, bruising, lymph node enlargement or tenderness    Objective:    Visit Vitals  /82   Pulse 84   Temp 96.6 °F (35.9 °C) (Oral)   Resp 16   Ht 5' 7\" (1.702 m)   Wt 158 lb (71.7 kg)   LMP 02/18/2020 (Exact Date)   SpO2 99%   BMI 24.75 kg/m²       Physical Exam:   PHYSICAL EXAMINATION    Constitutional  · Appearance: well-nourished, well developed, alert, in no acute distress    HENT  · Head and Face: appears normal    Gastrointestinal  · Abdominal Examination: abdomen non-tender to palpation, normal bowel sounds, no masses present  · Liver and spleen: no hepatomegaly present, spleen not palpable  · Hernias: no hernias identified    Genitourinary  · deferred    Skin  · General Inspection: no rash, no lesions identified    Neurologic/Psychiatric  · Mental Status:  · Orientation: grossly oriented to person, place and time  · Mood and Affect: mood normal, affect appropriate    Assessment:   27 y.o. with pelvic pain. I suspect that pain is secondary to endometriosis and constipation. Patient with bilateral ovarian cysts. Possible endometriomas? Vs hemorrhagic cysts. Plan:   - referral to GI  - docusate and biscodyl  - will start on orilissa with progestin add back. Samples given. - naproxen for pain  - will schedule for surgery at next visit if no improvement in pain. If some improvement, will move to 200mg bid of orilissa.     Followup in 1 month

## 2020-02-19 NOTE — PROGRESS NOTES
Identified pt with two pt identifiers(name and ). Reviewed record in preparation for visit and have obtained necessary documentation. Chief Complaint   Patient presents with    Ovarian Cyst      Vitals:    20 1038 20 1046   BP: 128/90 127/82   Pulse: 84    Resp: 16    Temp: 96.6 °F (35.9 °C)    TempSrc: Oral    SpO2: 99%    Weight: 158 lb (71.7 kg)    Height: 5' 7\" (1.702 m)    PainSc:   8    PainLoc: Pelvic    LMP: 2020       Health Maintenance Review: Patient reminded of \"due or due soon\" health maintenance. I have asked the patient to contact his/her primary care provider (PCP) for follow-up on his/her health maintenance. Coordination of Care Questionnaire:  :   1) Have you been to an emergency room, urgent care, or hospitalized since your last visit? If yes, where when, and reason for visit? yes   - -20: [facility] ED for UTI, imaging done , which showed bilateral ovarian cysts    2. Have seen or consulted any other health care provider since your last visit? If yes, where when, and reason for visit? Yes: PCP- Dr. Ashok Scanlon      Patient is accompanied by daughter I have received verbal consent from [address] to discuss any/all medical information while they are present in the room.

## 2020-02-26 ENCOUNTER — TELEPHONE (OUTPATIENT)
Dept: OBGYN CLINIC | Age: 35
End: 2020-02-26

## 2020-02-26 NOTE — TELEPHONE ENCOUNTER
Pt returned call, was informed she had been approved for the Orilissa (arpproval dates were given to pt) Pt  stated she just returned from the pharmacy, and noticed she has a Rx for Orilissa in her bag. Pt was appreciative of the phone call.

## 2020-02-26 NOTE — TELEPHONE ENCOUNTER
Left VM message for pt to return call to the office. Wanted to make sure the pt is aware that she has been approved for the Orilissa 150 mg as of 2/19/2020. The approval is from 2/19/2020-2/18/2021.

## 2020-06-30 ENCOUNTER — APPOINTMENT (OUTPATIENT)
Dept: CT IMAGING | Age: 35
End: 2020-06-30
Attending: PHYSICIAN ASSISTANT
Payer: MEDICAID

## 2020-06-30 ENCOUNTER — HOSPITAL ENCOUNTER (EMERGENCY)
Age: 35
Discharge: HOME | End: 2020-07-01
Attending: EMERGENCY MEDICINE | Admitting: EMERGENCY MEDICINE
Payer: MEDICAID

## 2020-06-30 ENCOUNTER — APPOINTMENT (OUTPATIENT)
Dept: ULTRASOUND IMAGING | Age: 35
End: 2020-06-30
Attending: PHYSICIAN ASSISTANT
Payer: MEDICAID

## 2020-06-30 DIAGNOSIS — N83.201 CYST OF RIGHT OVARY: Primary | ICD-10-CM

## 2020-06-30 DIAGNOSIS — R31.9 HEMATURIA, UNSPECIFIED TYPE: ICD-10-CM

## 2020-06-30 LAB
ALBUMIN SERPL-MCNC: 3.8 G/DL (ref 3.5–5)
ALBUMIN/GLOB SERPL: 1 {RATIO} (ref 1.1–2.2)
ALP SERPL-CCNC: 70 U/L (ref 45–117)
ALT SERPL-CCNC: 14 U/L (ref 12–78)
ANION GAP SERPL CALC-SCNC: 5 MMOL/L (ref 5–15)
APPEARANCE UR: CLEAR
AST SERPL-CCNC: 9 U/L (ref 15–37)
BACTERIA URNS QL MICRO: NEGATIVE /HPF
BASOPHILS # BLD: 0.1 K/UL (ref 0–0.1)
BASOPHILS NFR BLD: 1 % (ref 0–1)
BILIRUB SERPL-MCNC: 0.3 MG/DL (ref 0.2–1)
BILIRUB UR QL: NEGATIVE
BUN SERPL-MCNC: 14 MG/DL (ref 6–20)
BUN/CREAT SERPL: 15 (ref 12–20)
CALCIUM SERPL-MCNC: 9.2 MG/DL (ref 8.5–10.1)
CHLORIDE SERPL-SCNC: 106 MMOL/L (ref 97–108)
CO2 SERPL-SCNC: 26 MMOL/L (ref 21–32)
COLOR UR: ABNORMAL
COMMENT, HOLDF: NORMAL
CREAT SERPL-MCNC: 0.93 MG/DL (ref 0.55–1.02)
DIFFERENTIAL METHOD BLD: ABNORMAL
EOSINOPHIL # BLD: 0.6 K/UL (ref 0–0.4)
EOSINOPHIL NFR BLD: 8 % (ref 0–7)
EPITH CASTS URNS QL MICRO: ABNORMAL /LPF
ERYTHROCYTE [DISTWIDTH] IN BLOOD BY AUTOMATED COUNT: 16.2 % (ref 11.5–14.5)
GLOBULIN SER CALC-MCNC: 3.9 G/DL (ref 2–4)
GLUCOSE SERPL-MCNC: 84 MG/DL (ref 65–100)
GLUCOSE UR STRIP.AUTO-MCNC: NEGATIVE MG/DL
HCG UR QL: NEGATIVE
HCT VFR BLD AUTO: 31.5 % (ref 35–47)
HGB BLD-MCNC: 10.5 G/DL (ref 11.5–16)
HGB UR QL STRIP: ABNORMAL
HYALINE CASTS URNS QL MICRO: ABNORMAL /LPF (ref 0–5)
IMM GRANULOCYTES # BLD AUTO: 0 K/UL (ref 0–0.04)
IMM GRANULOCYTES NFR BLD AUTO: 0 % (ref 0–0.5)
KETONES UR QL STRIP.AUTO: NEGATIVE MG/DL
LEUKOCYTE ESTERASE UR QL STRIP.AUTO: NEGATIVE
LYMPHOCYTES # BLD: 2.4 K/UL (ref 0.8–3.5)
LYMPHOCYTES NFR BLD: 35 % (ref 12–49)
MCH RBC QN AUTO: 26.3 PG (ref 26–34)
MCHC RBC AUTO-ENTMCNC: 33.3 G/DL (ref 30–36.5)
MCV RBC AUTO: 78.9 FL (ref 80–99)
MONOCYTES # BLD: 0.5 K/UL (ref 0–1)
MONOCYTES NFR BLD: 7 % (ref 5–13)
NEUTS SEG # BLD: 3.4 K/UL (ref 1.8–8)
NEUTS SEG NFR BLD: 49 % (ref 32–75)
NITRITE UR QL STRIP.AUTO: NEGATIVE
NRBC # BLD: 0 K/UL (ref 0–0.01)
NRBC BLD-RTO: 0 PER 100 WBC
PH UR STRIP: 6 [PH] (ref 5–8)
PLATELET # BLD AUTO: 258 K/UL (ref 150–400)
PMV BLD AUTO: 11.3 FL (ref 8.9–12.9)
POTASSIUM SERPL-SCNC: 4.1 MMOL/L (ref 3.5–5.1)
PROT SERPL-MCNC: 7.7 G/DL (ref 6.4–8.2)
PROT UR STRIP-MCNC: NEGATIVE MG/DL
RBC # BLD AUTO: 3.99 M/UL (ref 3.8–5.2)
RBC #/AREA URNS HPF: ABNORMAL /HPF (ref 0–5)
SAMPLES BEING HELD,HOLD: NORMAL
SODIUM SERPL-SCNC: 137 MMOL/L (ref 136–145)
SP GR UR REFRACTOMETRY: 1.01 (ref 1–1.03)
UR CULT HOLD, URHOLD: NORMAL
UROBILINOGEN UR QL STRIP.AUTO: 0.2 EU/DL (ref 0.2–1)
WBC # BLD AUTO: 6.9 K/UL (ref 3.6–11)
WBC URNS QL MICRO: ABNORMAL /HPF (ref 0–4)

## 2020-06-30 PROCEDURE — 99284 EMERGENCY DEPT VISIT MOD MDM: CPT

## 2020-06-30 PROCEDURE — 81025 URINE PREGNANCY TEST: CPT

## 2020-06-30 PROCEDURE — 76830 TRANSVAGINAL US NON-OB: CPT

## 2020-06-30 PROCEDURE — 74011250636 HC RX REV CODE- 250/636: Performed by: PHYSICIAN ASSISTANT

## 2020-06-30 PROCEDURE — 85025 COMPLETE CBC W/AUTO DIFF WBC: CPT

## 2020-06-30 PROCEDURE — 80053 COMPREHEN METABOLIC PANEL: CPT

## 2020-06-30 PROCEDURE — 76856 US EXAM PELVIC COMPLETE: CPT

## 2020-06-30 PROCEDURE — 96374 THER/PROPH/DIAG INJ IV PUSH: CPT

## 2020-06-30 PROCEDURE — 81001 URINALYSIS AUTO W/SCOPE: CPT

## 2020-06-30 PROCEDURE — 74176 CT ABD & PELVIS W/O CONTRAST: CPT

## 2020-06-30 RX ORDER — KETOROLAC TROMETHAMINE 30 MG/ML
30 INJECTION, SOLUTION INTRAMUSCULAR; INTRAVENOUS
Status: COMPLETED | OUTPATIENT
Start: 2020-06-30 | End: 2020-06-30

## 2020-06-30 RX ORDER — TRAMADOL HYDROCHLORIDE 50 MG/1
50 TABLET ORAL
Qty: 10 TAB | Refills: 0 | Status: SHIPPED | OUTPATIENT
Start: 2020-06-30 | End: 2020-07-03

## 2020-06-30 RX ADMIN — KETOROLAC TROMETHAMINE 30 MG: 30 INJECTION, SOLUTION INTRAMUSCULAR at 21:28

## 2020-06-30 RX ADMIN — SODIUM CHLORIDE 1000 ML: 900 INJECTION, SOLUTION INTRAVENOUS at 21:21

## 2020-07-01 VITALS
HEART RATE: 81 BPM | TEMPERATURE: 98.7 F | OXYGEN SATURATION: 99 % | RESPIRATION RATE: 16 BRPM | SYSTOLIC BLOOD PRESSURE: 127 MMHG | DIASTOLIC BLOOD PRESSURE: 85 MMHG

## 2020-07-01 NOTE — ED TRIAGE NOTES
Pt presents with red blood in urine x 1 week. Pt reports it occurs sometimes but not all the time. Pt also reports burning with urination and urgency. Pt reports hx of UTI.  Pt also has complaint of eyes are ears feeling congested with possible sinus infection

## 2020-07-01 NOTE — DISCHARGE INSTRUCTIONS
Patient Education        Blood in the Urine: Care Instructions  Your Care Instructions     Blood in the urine, or hematuria, may make the urine look red, brown, or pink. There may be blood every time you urinate or just from time to time. You cannot always see blood in the urine, but it will show up in a urine test.  Blood in the urine may be serious. It should always be checked by a doctor. Your doctor may recommend more tests, including an X-ray, a CT scan, or a cystoscopy (which lets a doctor look inside the urethra and bladder). Blood in the urine can be a sign of another problem. Common causes are bladder infections and kidney stones. An injury to your groin or your genital area can also cause bleeding in the urinary tract. Very hard exercise--such as running a marathon--can cause blood in the urine. Blood in the urine can also be a sign of kidney disease or cancer in the bladder or kidney. Many cases of blood in the urine are caused by a harmless condition that runs in families. This is called benign familial hematuria. It does not need any treatment. Sometimes your urine may look red or brown even though it does not contain blood. For example, not getting enough fluids (dehydration), taking certain medicines, or having a liver problem can change the color of your urine. Eating foods such as beets, rhubarb, or blackberries or foods with red food coloring can make your urine look red or pink. Follow-up care is a key part of your treatment and safety. Be sure to make and go to all appointments, and call your doctor if you are having problems. It's also a good idea to know your test results and keep a list of the medicines you take. When should you call for help? Call your doctor now or seek immediate medical care if:  · You have symptoms of a urinary infection. For example:  ? You have pus in your urine. ? You have pain in your back just below your rib cage. This is called flank pain. ?  You have a fever, chills, or body aches. ? It hurts to urinate. ? You have groin or belly pain. · You have more blood in your urine. Watch closely for changes in your health, and be sure to contact your doctor if:  · You have new urination problems. · You do not get better as expected. Where can you learn more? Go to http://www.gray.com/  Enter J464 in the search box to learn more about \"Blood in the Urine: Care Instructions. \"  Current as of: August 22, 2019               Content Version: 12.5  © 7276-9633 [employer]. Care instructions adapted under license by [employer] (which disclaims liability or warranty for this information). If you have questions about a medical condition or this instruction, always ask your healthcare professional. [address] any warranty or liability for your use of this information. Patient Education        Hemorrhagic Ovarian Cyst: Care Instructions  Your Care Instructions     Sometimes a sac forms on the surface of a woman's ovary. When the sac swells up with fluid, it forms a cyst. If the cyst bleeds, it is called a hemorrhagic (say \"mjg-anc-WC-jick\") ovarian cyst. If the cyst breaks open, blood and fluid spill out into the lower belly and pelvis. You may not have symptoms from the cyst. But if it is large, or if it twists or breaks open, you may have pain or other problems. You may feel pain from the cyst or have symptoms from losing blood. Your doctor may use a pelvic ultrasound to see if you have a cyst. Blood tests can help your doctor tell if the cyst is bleeding or you have lost a lot of blood. Treatment depends on your symptoms. If they are mild, your doctor may suggest carefully watching your symptoms and doing blood tests again. But if you have a cyst that is very large, bleeds a lot, or causes other problems, your doctor may suggest surgery to remove it.  If the bleeding is heavy, you may also need treatment to replace the blood. Follow-up care is a key part of your treatment and safety. Be sure to make and go to all appointments, and call your doctor if you are having problems. It's also a good idea to know your test results and keep a list of the medicines you take. How can you care for yourself at home? · Use heat, such as a warm water bottle, a heating pad set on low, or a warm bath, to relax tense muscles and relieve cramping. · Be safe with medicines. Read and follow all instructions on the label. ? If the doctor gave you a prescription medicine for pain, take it as prescribed. ? If you are not taking a prescription pain medicine, ask your doctor if you can take an over-the-counter medicine. When should you call for help? [id number] anytime you think you may need emergency care. For example, call if:  · You passed out (lost consciousness). Call your doctor now or seek immediate medical care if:  · You have severe vaginal bleeding. · You are dizzy or lightheaded, or you feel like you may faint. · You have new or worse pain in your belly or pelvis. Watch closely for changes in your health, and be sure to contact your doctor if:  · You think you may be pregnant. · You do not get better as expected. Where can you learn more? Go to http://roula-susan.info/  Enter D256 in the search box to learn more about \"Hemorrhagic Ovarian Cyst: Care Instructions. \"  Current as of: November 8, 2019               Content Version: 12.5  © 1933-7992 [employer]wise, [employer]. Care instructions adapted under license by [employer] (which disclaims liability or warranty for this information). If you have questions about a medical condition or this instruction, always ask your healthcare professional. [place] [address] any warranty or liability for your use of this information.

## 2020-07-01 NOTE — ED PROVIDER NOTES
Kat Schmidt is a 27 y.o. female  who presents by private vehicle to ER with c/o Patient presents with:  Blood in Urine  Patient presents with complaints of blood in urine x1 week, patient reports this is intermittent with occasional dysuria. Patient reports history of UTIs, denies fever chills, reports mild lower abdominal pain. Patient also with eye pain with ear congestion and concerns for sinus infection. Patient with history of sickle cell trait. She specifically denies any fevers, chills, nausea, vomiting, chest pain, shortness of breath, headache, rash, diarrhea,  bowel changes, sweating or weight loss. PCP: Olivia Lorenz NP   PMHx significant for: Past Medical History:  12/1/2016: Abscess  No date: Anemia NEC  No date: Bipolar 1 disorder ([facility] [address]  No date: Breast tenderness      Comment:  bilateral, since 11/2016, on and off   No date: Depression  No date: Dizziness  No date: Gastrointestinal disorder      Comment:  pancreatitis  No date: Increased frequency of headaches  10/2012: Kidney stones  No date: Nausea  2009: Nipple discharge      Comment:  white/clear discharge since birth of child   No date: Other ill-defined conditions(799.89)      Comment:  sickle cell trait  No date: Other ill-defined conditions(799.89)      Comment:  anemia  No date: Psychiatric disorder      Comment:  depression, bi-polar  No date: Psychotic disorder ([facility] [address]      Comment:  bipolar 1  No date: Sickle cell trait (HCC)  No date: Sickle cell trait (HCC)  No date: Stomach pain   PSHx significant for: Past Surgical History:  No date: HX GYN      Comment:  tubaligation  No date: HX GYN      Comment:  ectopic pregnancy surgery  No date: HX OTHER SURGICAL      Comment:  hx of blood transfusions  Social Hx: Tobacco use: Social History    Tobacco Use      Smoking status: Current Every Day Smoker        Packs/day: 0.25        Years: 9.00        Pack years: 2.25      Smokeless tobacco: Never Used  ; EtOH use:  The patient states she drinks 0 per week.; Illicit Drug use: Allergies:   -- Latex -- Itching   -- Cetirizine -- Vertigo    --  Other reaction(s): Bradycardia   -- Tramadol -- Vertigo   -- Naproxen -- Vertigo    There are no other complaints, changes or physical findings at this time.               Past Medical History:   Diagnosis Date    Abscess 12/1/2016    Anemia NEC     Bipolar 1 disorder ([facility] [address]     Breast tenderness     bilateral, since 11/2016, on and off     Depression     Dizziness     Gastrointestinal disorder     pancreatitis    Increased frequency of headaches     Kidney stones 10/2012    Nausea     Nipple discharge 2009    white/clear discharge since birth of child     Other ill-defined conditions(799.89)     sickle cell trait    Other ill-defined conditions(799.89)     anemia    Psychiatric disorder     depression, bi-polar    Psychotic disorder (HCC)     bipolar 1    Sickle cell trait ([facility] [address]     Sickle cell trait ([facility] [address]     Stomach pain        Past Surgical History:   Procedure Laterality Date    HX GYN      tubaligation    HX GYN      ectopic pregnancy surgery    HX OTHER SURGICAL      hx of blood transfusions         Family History:   Problem Relation Age of Onset    Heart Disease Father        Social History     Socioeconomic History    Marital status: SINGLE     Spouse name: Not on file    Number of children: Not on file    Years of education: Not on file    Highest education level: Not on file   Occupational History    Not on file   Social Needs    Financial resource strain: Not on file    Food insecurity     Worry: Not on file     Inability: Not on file    Transportation needs     Medical: Not on file     Non-medical: Not on file   Tobacco Use    Smoking status: Current Every Day Smoker     Packs/day: 0.25     Years: 9.00     Pack years: 2.25    Smokeless tobacco: Never Used   Substance and Sexual Activity    Alcohol use: No    Drug use: No     Types: Marijuana     Comment: occasional    Sexual activity: Yes     Partners: Male     Birth control/protection: None   Lifestyle    Physical activity     Days per week: Not on file     Minutes per session: Not on file    Stress: Not on file   Relationships    Social connections     Talks on phone: Not on file     Gets together: Not on file     Attends [employer] service: Not on file     Active member of club or organization: Not on file     Attends meetings of clubs or organizations: Not on file     Relationship status: Not on file    Intimate partner violence     Fear of current or ex partner: Not on file     Emotionally abused: Not on file     Physically abused: Not on file     Forced sexual activity: Not on file   Other Topics Concern    Not on file   Social History Narrative    Not on file         ALLERGIES: Latex; Cetirizine; Tramadol; and Naproxen    Review of Systems   Constitutional: Negative for activity change, chills and fever. HENT: Negative for congestion, rhinorrhea and sore throat. Respiratory: Negative for shortness of breath. Cardiovascular: Negative for chest pain and leg swelling. Gastrointestinal: Positive for abdominal pain. Negative for diarrhea, nausea and vomiting. Genitourinary: Positive for hematuria. Negative for dysuria, vaginal bleeding and vaginal discharge. Musculoskeletal: Negative for arthralgias and myalgias. Neurological: Positive for headaches. Negative for dizziness. Psychiatric/Behavioral: Negative for confusion. All other systems reviewed and are negative. Vitals:    06/30/20 2014 07/01/20 0001   BP: 129/87 127/85   Pulse: (!) 104 81   Resp:  16   Temp: 98.7 °F (37.1 °C) 98.7 °F (37.1 °C)   SpO2: 99% 99%            Physical Exam  Constitutional:       Appearance: Normal appearance. She is well-developed. HENT:      Head: Normocephalic and atraumatic.       Right Ear: Tympanic membrane and external ear normal.      Left Ear: Tympanic membrane and external ear normal. Nose: Nose normal. No nasal tenderness. Right Sinus: No maxillary sinus tenderness or frontal sinus tenderness. Left Sinus: No maxillary sinus tenderness or frontal sinus tenderness. Mouth/Throat:      Pharynx: No oropharyngeal exudate. Eyes:      General: Lids are normal.         Right eye: No discharge. Left eye: No discharge. Conjunctiva/sclera: Conjunctivae normal.   Neck:      Musculoskeletal: Normal range of motion. Thyroid: No thyromegaly. Trachea: No tracheal deviation. Cardiovascular:      Rate and Rhythm: Normal rate and regular rhythm. Heart sounds: Normal heart sounds. Pulmonary:      Effort: Pulmonary effort is normal.      Breath sounds: Normal breath sounds. Abdominal:      General: Bowel sounds are normal.      Palpations: Abdomen is soft. Tenderness: There is abdominal tenderness in the suprapubic area. There is no right CVA tenderness, left CVA tenderness, guarding or rebound. Musculoskeletal: Normal range of motion. Skin:     General: Skin is warm and dry. Neurological:      Mental Status: She is alert and oriented to person, place, and time. Psychiatric:         Judgment: Judgment normal.          MDM  Number of Diagnoses or Management Options  Cyst of right ovary:   Hematuria, unspecified type:   Diagnosis management comments: Assesment/Plan- 27 y.o. Patient presents with:  Blood in Urine  differential includes: Hematuria, UTI, kidney infection, kidney stone. Labs and imaging reviewed with urine analysis with blood, no signs of infection. CT abdomen pelvis with large right ovarian cyst causing right hydronephrosis. No acute findings and labs. Ultrasound pelvis showing large right ovarian cyst with no signs of torsion. Patient is well-appearing, afebrile, tolerating p.o. Patient with known history of ovarian cyst, after reviewing previous ultrasound cyst appears to be larger now with hydronephrosis.   Patient is still able to urinate without difficulty, creatinine is normal.  recommend urogynecology follow up. Patient educated on reasons to return to the ED.            Procedures

## 2020-07-29 ENCOUNTER — OFFICE VISIT (OUTPATIENT)
Dept: OBGYN CLINIC | Age: 35
End: 2020-07-29

## 2020-07-29 VITALS — WEIGHT: 152 LBS | DIASTOLIC BLOOD PRESSURE: 84 MMHG | SYSTOLIC BLOOD PRESSURE: 122 MMHG | BODY MASS INDEX: 23.81 KG/M2

## 2020-07-29 DIAGNOSIS — R10.2 PELVIC PAIN IN FEMALE: ICD-10-CM

## 2020-07-29 DIAGNOSIS — N83.202 CYSTS OF BOTH OVARIES: Primary | ICD-10-CM

## 2020-07-29 DIAGNOSIS — N83.201 CYSTS OF BOTH OVARIES: Primary | ICD-10-CM

## 2020-07-29 NOTE — PROGRESS NOTES
Patient presents today to discuss ultrasound results. She had a recent ultrasound at [facility]. Of note bilateral complex ovarian cysts the largest was approx 8cm. See attached ultrasound report. We discussed findings of US today including complex bilateral ovarian cysts. We discussed options for mgmt today including repeating US in office as I can not review images from previous study. Discussed tumor markers will be sent today with possible ref to [facility] if needed for surgery. Visit time 15min. More than 50% of my face-to-face time was spend on patient counseling.     Signed By: Radames Cho MD     July 29, 2020

## 2020-08-06 ENCOUNTER — OFFICE VISIT (OUTPATIENT)
Dept: OBGYN CLINIC | Age: 35
End: 2020-08-06
Payer: MEDICAID

## 2020-08-06 VITALS
HEIGHT: 67 IN | SYSTOLIC BLOOD PRESSURE: 122 MMHG | BODY MASS INDEX: 21.85 KG/M2 | DIASTOLIC BLOOD PRESSURE: 80 MMHG | WEIGHT: 139.25 LBS

## 2020-08-06 DIAGNOSIS — N83.209 CYST OF OVARY, UNSPECIFIED LATERALITY: Primary | ICD-10-CM

## 2020-08-06 PROCEDURE — 76830 TRANSVAGINAL US NON-OB: CPT | Performed by: OBSTETRICS & GYNECOLOGY

## 2020-08-06 PROCEDURE — 99213 OFFICE O/P EST LOW 20 MIN: CPT | Performed by: OBSTETRICS & GYNECOLOGY

## 2020-08-06 NOTE — PATIENT INSTRUCTIONS
Pelvic Ultrasound for Women: About This Test  What is it? A pelvic ultrasound is a test that uses sound waves to make a picture of the inside of the lower belly (pelvis). It allows your doctor to see your bladder, cervix, uterus, fallopian tubes, and ovaries. The sound waves create a picture on a video monitor. The test can be done in two ways:  Transabdominal.   A small handheld device (transducer) is passed back and forth over your lower belly. Transvaginal.   A thin, lubricated transducer is placed in your vagina. Why is this test done? A pelvic ultrasound test is done to:  · Find the cause of urinary problems. · Find out what's causing pelvic pain. · Look for causes of vaginal bleeding and menstrual problems. · Check for growths or masses like ovarian cysts or uterine fibroids. · See if a fertilized egg is growing outside the uterus. This is called a tubal pregnancy. · Confirm the stage of a pregnancy and check the baby's heartbeat. · Look for the correct placement of an intrauterine device (IUD). How can you prepare for the test?  In general, there's nothing you have to do before this test, unless your doctor tells you to. How is the test done? For a transabdominal ultrasound:  · You lie down on your back on an exam table. · A warm gel will be spread on your lower belly. This improves the transmission of the sound waves. The handheld transducer is pressed against your belly and gently moved back and forth. A picture of the organs can be seen on a video monitor. For a transvaginal ultrasound:  · You lie down on your back on an exam table with your hips slightly raised. · The tip of a thin, lubricated transducer probe is gently inserted into your vagina. The transducer may be moved around to get a complete view. The images from the test are shown on a video monitor. How long does the test take? A pelvic ultrasound can take 15 to 30 minutes.   What happens after the test?  · You will probably be able to go home right away. It depends on the reason for the test.  · You can go back to your usual activities right away. Follow-up care is a key part of your treatment and safety. Be sure to make and go to all appointments, and call your doctor if you are having problems. It's also a good idea to keep a list of the medicines you take. Ask your doctor when you can expect to have your test results. Where can you learn more? Go to http://www.gray.com/  Enter Y734 in the search box to learn more about \"Pelvic Ultrasound for Women: About This Test.\"  Current as of: November 8, 2019               Content Version: 12.5  © 6857-5071 [employer]. Care instructions adapted under license by [employer] (which disclaims liability or warranty for this information). If you have questions about a medical condition or this instruction, always ask your healthcare professional. [address] any warranty or liability for your use of this information.

## 2020-08-06 NOTE — PROGRESS NOTES
Patient presents today to discuss ultrasound results. We discussed findings of US today including:  THE UTERUS IS ANTEVERTED, NORMAL IN SIZE AND HETEROGENOUS IN ECHOGENICITY. THERE APPEARS TO  BE MULTIPLE FIBROIDS SEEN THROUGHOUT THE UTERUS. RT LAT INTRAMURAL MEASURING 17 X 15 AND 16 X  14 MM. POSTERIOR CERVIX INTRAMURAL MEASURING 24 X 14 MM AND 19 X 14 MM. THE ENDOMETRIUM MEASURES 5.6MM IN THICKNESS. NO MASSES OR ABNORMALITIES ARE SEEN. RIGHT OVARY APPEARS ENLARGED WITH A SIMPLE CYST MEASURING 79 X 56 X 68MM. LEFT OVARY APPEARS TO HAVE A HEMORRHAGIC CYST MEASURING 27 X 19 X 25 MM. .  NO FREE FLUID IS SEEN IN THE CDS. We discussed BILATERAL OVARIAN CYSTS options for mgmt today including expectant v surgical mgmt. She declines further expectant mgmt as these cysts have been present and painful for months. We discussed sending tumor markers today, will proceed with surgery when normal.      The details of planned diagnostic laparoscopy with possible davinci bilateral ovarian cystectomies. We discussed the risks, benefits, advantages, disadvantages, and alternatives to surgery. She understands that minimally invasive techniques will be attempted, but possible conversion to more invasive techniques may be necessary. She understands and accepts the possible risks and possible complications of planned surgery, including but not limited to infection, bleeding, damage to surrounding structures requiring repair (including but not limited to bowel, bladder, uterus, fallopian tubes, blood vessels, and ovaries) as well as risks to anesthesia. She understands risks of needing additional procedures. She understands risks of postoperative adhesions. She understands risks of postoperative pain. She understands and accepts the risk of surgical complications, including the remote risk of death or serious disability-that exists with any surgical procedure.   She understands that surgery does not guarantee alleviation of symptoms. She has reasonable postoperative expectations, expected hospital course, and anticipated recovery time. I have answered all her questions at this time. Plan to proceed with surgery (our surgery scheduler will contact patient to schedule surgery). The patient was counseled at length about the risks of edi Covid-19 during their perioperative period and any recovery window from their procedure. The patient was made aware that edi Covid-19  may worsen their prognosis for recovering from their procedure and lend to a higher morbidity and/or mortality risk. All material risks, benefits, and reasonable alternatives including postponing the procedure were discussed. The patient does  wish to proceed with the procedure at this time. Visit time 15min. More than 50% of my face-to-face time was spend on patient counseling.     Signed By: Walt De Luna     August 6, 2020

## 2020-08-11 LAB
AFP-TM SERPL-MCNC: 1.5 NG/ML (ref 0–8.3)
CANCER AG125 SERPL-ACNC: 358 U/ML (ref 0–38.1)
DHEA-S SERPL-MCNC: 152 UG/DL (ref 57.3–279.2)
ESTRADIOL SERPL-MCNC: 69.4 PG/ML
HCG INTACT+B SERPL-ACNC: <1 MIU/ML
INHIBIN A SERPL-MCNC: 10.9 PG/ML
INHIBIN B SERPL-MCNC: 115.8 PG/ML
LDH SERPL-CCNC: 133 IU/L (ref 119–226)
MIS SERPL-MCNC: 0.98 NG/ML
TESTOST FREE SERPL-MCNC: 0.4 PG/ML (ref 0–4.2)
TESTOST SERPL-MCNC: 12 NG/DL (ref 8–48)

## 2020-08-14 ENCOUNTER — OFFICE VISIT (OUTPATIENT)
Dept: GYNECOLOGY | Age: 35
End: 2020-08-14
Payer: MEDICAID

## 2020-08-14 VITALS
HEART RATE: 100 BPM | SYSTOLIC BLOOD PRESSURE: 128 MMHG | BODY MASS INDEX: 22.1 KG/M2 | WEIGHT: 140.8 LBS | DIASTOLIC BLOOD PRESSURE: 91 MMHG | HEIGHT: 67 IN

## 2020-08-14 DIAGNOSIS — R19.00 PELVIC MASS: Primary | ICD-10-CM

## 2020-08-14 DIAGNOSIS — R97.1 ELEVATED CA-125: ICD-10-CM

## 2020-08-14 PROCEDURE — 99204 OFFICE O/P NEW MOD 45 MIN: CPT | Performed by: OBSTETRICS & GYNECOLOGY

## 2020-08-14 NOTE — PROGRESS NOTES
[address] [place] [address] Mathias Moritz [address], [address] [place] [address]  P [phone]  F [phone]    Office Note  Patient ID:   Name:  Ryan Aguilar  MRN:  [id number]  :  1985/34 y.o. Date:  2020      HISTORY OF PRESENT ILLNESS:  Ryan Aguilar is a 27 y.o.  premenopausal female who is being seen for bilateral complex ovarian masses and a markedly elevated CA-125. She is referred by Dr. Luis Alberto Andino. The right ovary is markedly enlarged, containing a high density cyst with multiple septations. This is causing right-sided hydronephrosis. The hydroureter has been present since at least 2019. The left ovary contains a smaller, but similar appearing cyst.  A CA-125 was drawn and found to be elevated at >300. Other tumor markers, including AFP, HCG, LDH, inhibin A, inhibin B, AMH, DHEAS, and testosterone were negative. I have been asked to see her in consultation for further evaluation and management. She was seen in the ER a month ago with hematuria. That is when the CT scan, as well as pelvic ultrasound, were done. ROS:   and GI review:  Negative  Cardiopulmonary review:  Negative   Musculoskeletal:  Negative    A comprehensive review of systems was negative except for that written in the History of Present Illness. , 10 point ROS      OB/GYN ROS:  [id number]  Vaginal delivery x 4  Hx of ectopic pregnancy  Hx of BTL      Problem List:  Patient Active Problem List    Diagnosis Date Noted    UTI (urinary tract infection) 2020    Complex cyst of right ovary 2019    Pain in pelvis 2019    Abscess 2016    History of depressed bipolar disorder ([facility] 2016    H/O schizophrenia 2016    Myalgia 2016    Perennial allergic rhinitis 2016    Iron deficiency 2015    Elevated cancer antigen 125 (CA-125) 2015    Nausea and vomiting 2013    Anemia 01/10/2013    Sickle cell trait ([facility] [address] 11/30/2012    Cyst of ovary 10/24/2012     PMH:  Past Medical History:   Diagnosis Date    Abscess 12/1/2016    Anemia NEC     Bipolar 1 disorder (HCC)     Breast tenderness     bilateral, since 11/2016, on and off     Depression     Dizziness     Gastrointestinal disorder     pancreatitis    Increased frequency of headaches     Kidney stones 10/2012    Nausea     Nipple discharge 2009    white/clear discharge since birth of child     Other ill-defined conditions(799.89)     sickle cell trait    Other ill-defined conditions(799.89)     anemia    Psychiatric disorder     depression, bi-polar    Psychotic disorder ([address]     bipolar 1    Sickle cell trait ([address]     Sickle cell trait ([address]     Stomach pain       PSH:  Past Surgical History:   Procedure Laterality Date    HX GYN      tubaligation    HX GYN      ectopic pregnancy surgery    HX OTHER SURGICAL      hx of blood transfusions      Social History:  Social History     Tobacco Use    Smoking status: Current Every Day Smoker     Packs/day: 0.25     Years: 9.00     Pack years: 2.25    Smokeless tobacco: Never Used   Substance Use Topics    Alcohol use: No      Family History:  Family History   Problem Relation Age of Onset    Heart Disease Father       Medications: (reviewed)  Current Outpatient Medications   Medication Sig    docusate sodium (COLACE) 100 mg capsule Take 1 Cap by mouth two (2) times a day.  naproxen (NAPROSYN) 500 mg tablet Take 1 Tab by mouth two (2) times daily (with meals).  ketorolac (TORADOL) 10 mg tablet 10 mg. No current facility-administered medications for this visit.       Allergies: (reviewed)  Allergies   Allergen Reactions    Latex Itching    Cetirizine Vertigo     Other reaction(s): Bradycardia    Tramadol Vertigo    Naproxen Vertigo          OBJECTIVE:    Physical Exam:  VITAL SIGNS: Vitals:    08/14/20 1050   BP: (!) 128/91   Pulse: 100   Weight: 140 lb 12.8 oz (63.9 kg)   Height: 5' 7.01\" (1.702 m)     Body mass index is 22.05 kg/m². GENERAL GARETH: Conversant, alert, oriented. No acute distress. HEENT: HEENT. No thyroid enlargement. No JVD. Neck: Supple without restrictions. RESPIRATORY: Clear to auscultation and percussion to the bases. No CVAT. CARDIOVASC: RRR without murmur/rub. GASTROINT: soft, non-tender, without masses or organomegaly   MUSCULOSKEL: no joint tenderness, deformity or swelling   EXTREMITIES: extremities normal, atraumatic, no cyanosis or edema   PELVIC: Normal external genitalia. Normal vagina. Normal cervix. Unable to delineate between uterus and and right adnexal mass. Limited mobility. Cul de sac nodularity noted. Patient very uncomfortable with manipulation of the gynecologic organs. RECTAL: Deferred   DON SURVEY: No suspicious lymphadenopathy or edema noted. NEURO: Grossly intact. No acute deficit. Lab Data:    Lab Results   Component Value Date/Time    WBC 6.9 06/30/2020 09:07 PM    HGB 10.5 (L) 06/30/2020 09:07 PM    HCT 31.5 (L) 06/30/2020 09:07 PM    PLATELET 756 25/43/2412 09:07 PM    MCV 78.9 (L) 06/30/2020 09:07 PM     Lab Results   Component Value Date/Time    Sodium 137 06/30/2020 09:07 PM    Potassium 4.1 06/30/2020 09:07 PM    Chloride 106 06/30/2020 09:07 PM    CO2 26 06/30/2020 09:07 PM    Anion gap 5 06/30/2020 09:07 PM    Glucose 84 06/30/2020 09:07 PM    BUN 14 06/30/2020 09:07 PM    Creatinine 0.93 06/30/2020 09:07 PM    BUN/Creatinine ratio 15 06/30/2020 09:07 PM    GFR est AA >60 06/30/2020 09:07 PM    GFR est non-AA >60 06/30/2020 09:07 PM    Calcium 9.2 06/30/2020 09:07 PM     Lab Results   Component Value Date/Time    Cancer Ag (CA) 125 358.0 (H) 08/06/2020 02:06 PM       CT of abdomen/pelvis (7/9/19)  LUNG BASES: No abnormality. LIVER: Small hypodensity right hepatic lobe, unchanged likely a cyst but too  small to characterize. GALLBLADDER: Unremarkable. SPLEEN: No enlargement or lesion.   PANCREAS: No mass or ductal dilatation. ADRENALS: No mass. KIDNEYS: Normal left kidney. Dilated right renal collecting system and renal  pelvis which contains small amount of high density material in the central renal  pelvis and ureteropelvic junction. No ureteral calculus identified. GI TRACT:  No bowel obstruction. Difficult to assess bowel wall thickening given  lack of oral contrast material.  PERITONEUM: No free air or free fluid. APPENDIX: Not clearly visualized. RETROPERITONEUM: No aortic aneurysm. LYMPH NODES:  None enlarged. ADDITIONAL COMMENTS: N/A.     URINARY BLADDER: Unremarkable. REPRODUCTIVE ORGANS: Uterus is present. Large, complex right ovarian lesion  measures 7.8 x 6.2 cm. 3.7 x 3.3 cm left ovarian lesion likely a cyst.  LYMPH NODES:  None enlarged. FREE FLUID:  None. BONES: No destructive bone lesion. ADDITIONAL COMMENTS: N/A.     IMPRESSION:  1. High density material within the mildly dilated right renal collecting  system/pelvis, likely blood. No obstructing calculus identified. Correlation/follow-up recommended. 2. Large complex right ovarian lesion may represent a hemorrhagic cyst, 7.8 x  6.2 cm. Smaller left ovarian lesion likely a cyst, 3.7 x 3.3 cm. Abdominal ultrasound (1/5/20)  LIVER:   The liver is normal in echotexture with no mass or other focal abnormality.      LIVER VASCULATURE:   The portal vein flow is antegrade.     GALLBLADDER:  The gallbladder is normal and no stones are identified. There is no wall  thickening or fluid around the gallbladder.      COMMON BILE DUCT:  There is no biliary duct dilatation and the common duct measures 2 mm in  diameter.      PANCREAS:  The visualized pancreas is normal.     SPLEEN:  The spleen is normal in echotexture and size and measures 8.9 cm in length.     RIGHT KIDNEY:  The right kidney demonstrates prominent hydronephrosis similar to CT Abdomen  omen 7/9/2019, etiology not defined.  Cortical thickness and echogenicity are  normal. The right kidney measures 14.3 cm in length.     LEFT KIDNEY:  The left kidney demonstrates normal echogenicity with no mass, stone or  hydronephrosis. The left kidney measures 13.1 cm in length.      RETROPERITONEUM:  The aorta tapers normally. The IVC is normal.  There is no ascites. IMPRESSION:   1. Right hydronephrosis of uncertain etiology, similar to prior CT. 2. Otherwise unremarkable. Pelvic ultrasound (1/5/20)  PELVIC SONOGRAM  is performed through the unremarkable urinary bladder.     The uterus is normal in size, contour and echotexture with no evidence for  fibroids. Uterus measures approximately 7.7 x 5.8 x 4.3 cm. Endometrial stripe  is not significantly thickened, measuring 9 mm by this technique.     Ovaries show a complex cyst on each side. Right cyst measures approximately 6.5  x 6.1 x 4.9 cm. Left cyst measures approximately 4.1 x 3.4 x 2.9 cm. Right ovary  measures 8.6 x 6.3 x 5.4 cm and left ovary measures 4.8 x 4.2 x 3.3 cm. There is  no significant free fluid in the pelvic cul-de-sac.     TRANSVAGINAL SONOGRAM  is performed following Pelvic Sonogram  in order to more  accurately measure the endometrial thickness and to more adequately delineate  ovarian anatomy.     The uterus is normal in echotexture with no evidence for fibroids. Endometrial  stripe measures 4 mm.      Right ovarian complex cyst by this technique measures approximately 5.8 x 5.7 x  5.3 cm. Left ovarian complex cyst by this technique measures 3.1 x 2.9 x 2.6 cm. Right ovary measures 3.6 x 7.2 x 6.3 cm and left ovary 4.3 x 3.3 x 3.2 cm. Flow  is preserved in both ovaries.     IMPRESSION:   1. 5.8 cm right ovarian cyst, probably hemorrhagic cyst, measured 7.3 cm  previously. 2. 3.1 cm complex left ovarian cyst, probably hemorrhagic cyst, measures 3.4 cm  Previously. CT of abdomen/pelvis (6/30/20)  LOWER THORAX: 3 mm nodule left upper lobe unchanged  LIVER: No mass. BILIARY TREE: Collapsed CBD is not dilated.   SPLEEN: within normal limits. PANCREAS: No focal abnormality. ADRENALS: Unremarkable. KIDNEYS/URETERS: Severe right-sided hydronephrosis and hydroureter. An  obstructing stone is not identified. Obstruction is at the level of the distal  ureter in the right adnexa. No left-sided hydronephrosis or stone  STOMACH: Unremarkable. SMALL BOWEL: No dilatation or wall thickening. COLON: No dilatation or wall thickening. APPENDIX: not identified but there are no secondary signs of appendicitis. PERITONEUM: No ascites or pneumoperitoneum. RETROPERITONEUM: No lymphadenopathy or aortic aneurysm. REPRODUCTIVE ORGANS: Uterus is not enlarged. Bilateral pelvic masses are again  noted. The right pelvic mass has enlarged 9.9 x 8.0 x 9.8 cm. It is high density  with multiple septations. The left ovarian high density cyst measures 4.4 x 4.1  x 4.6 cm  URINARY BLADDER: No mass or calculus. BONES: No destructive bone lesion. ABDOMINAL WALL: No mass or hernia. ADDITIONAL COMMENTS: N/A     IMPRESSION:  Enlarging right adnexal complex cystic mass. On previous ultrasound there is  internal debris within the lesion. While this may represent an enlarging  hemorrhagic cyst or endometrioma low-grade neoplasm cannot be excluded. This is  the cause of the patient's severe right-sided hydronephrosis. The left ovarian  complex cyst has minimally enlarged as well. Follow-up with GYN is recommended      Pelvic ultrasound (6/30/20)  PELVIC ULTRASOUND:   Realtime sonographic imaging of the pelvis was performed transabdominally. The  uterus measures 8.8 x 4.9 cm and is normal in appearance. The right ovary  measures 7.5 x 7.7 x 8.9 cm and the left ovary measures 4.9 x 3.8 x 3.8 cm. They  are ovaries normal in appearance. Largest right adnexal cyst 7.9 x 6.2 x 6 cm No  free fluid. No adnexal mass lesion.     IMPRESSION:   Large ovarian cysts.  Correlation with transvaginal ultrasound will be performed.     TRANSVAGINAL ULTRASOUND:  Realtime sonographic imaging of the pelvis was performed transvaginally. The  uterus  is normal in appearance. The endometrial stripe measures 9 mm. The right ovary measures 8.2 x 7.1 cm and the left ovary measures 5.2 x 3.8 cm. There is normal flow identified to the ovaries. The ovaries are normal in  appearance. Right adnexal cyst measures 7.9 x 6.2 x 6 cm in size.     IMPRESSION:   Large ovarian cysts. IMPRESSION/PLAN:  Bere Bardales is a 27 y.o. female with a working diagnosis of a complex pelvic mass and elevated CA-125. I reviewed with Bere Bardales her medical records, physical exam, and review of symptoms. An ovarian malignancy cannot be excluded, but I suspect this is secondary to severe pelvic endometriosis. The mass has not really changed in size or character since her CT in July 2019, however, the right hydronephrosis has increased. I suspect this is due to compression of the ureter by the mass or due to inflammation from worsening hydronephrosis. Removing the mass should relieve this and I doubt a stent will be necessary. I explained to her that he cure for endometriosis is resection of disease and rendering her menopausal by removing both ovaries. I also recommend a hysterectomy as well, as it appears to be intimately involved with the right adnexal mass. I explained that surgery for endometriosis can often be as difficult as cancer surgery. I also explained that there is a possibility that we might have to resect endometriosis involving the gastrointestinal tract. We will try to accomplish the procedure laparoscopically, but she is aware that we might have to convert to a laparotomy. Hopefully, if we do, we would be able to perform the procedure through  Pfannenstiel incision. She was counseled on the risks, benefits, indications, and alternatives of surgery. Her questions were answered and she wishes to proceed as planned.           Signed By: Stefany Jane MD     8/14/2020/7:26 AM

## 2020-08-14 NOTE — LETTER
2020 12:15 PM 
 
Patient:  Vivian Romero YOB: 1985 Date of Visit: 2020 Dear Diomedes Zepeda MD 
[place] [id number] VIA In Basket: Thank you for referring Ms. Darrick Bruce to me for evaluation/treatment. Below are the relevant portions of my assessment and plan of care. [facility], [address] 
P [phone]  F [phone] Office Note Patient ID:  
Name:  Vivian Romero MRN:  [id number] :  1985/34 y.o. Date:  2020 HISTORY OF PRESENT ILLNESS: 
Vivian Romero is a 27 y.o.  premenopausal female who is being seen for bilateral complex ovarian masses and a markedly elevated CA-125. She is referred by Dr. Jose A Bradshaw. The right ovary is markedly enlarged, containing a high density cyst with multiple septations. This is causing right-sided hydronephrosis. The hydroureter has been present since at least 2019. The left ovary contains a smaller, but similar appearing cyst.  A CA-125 was drawn and found to be elevated at >300. Other tumor markers, including AFP, HCG, LDH, inhibin A, inhibin B, AMH, DHEAS, and testosterone were negative. I have been asked to see her in consultation for further evaluation and management. She was seen in the ER a month ago with hematuria. That is when the CT scan, as well as pelvic ultrasound, were done. ROS: 
 and GI review:  Negative Cardiopulmonary review:  Negative Musculoskeletal:  Negative A comprehensive review of systems was negative except for that written in the History of Present Illness. , 10 point ROS 
 
 
OB/GYN ROS: 
[id number] Vaginal delivery x 4 Hx of ectopic pregnancy Hx of BTL Problem List: 
Patient Active Problem List  
 Diagnosis Date Noted  UTI (urinary tract infection) 2020  Complex cyst of right ovary 2019  Pain in pelvis 07/20/2019  Abscess 12/01/2016  History of depressed bipolar disorder ([address] 11/23/2016  H/O schizophrenia 11/23/2016  Myalgia 11/23/2016  Perennial allergic rhinitis 11/23/2016  Iron deficiency 09/30/2015  Elevated cancer antigen 125 (CA-125) 09/27/2015  Nausea and vomiting 01/24/2013  Anemia 01/10/2013  Sickle cell trait ([address] 11/30/2012  Cyst of ovary 10/24/2012 PMH: 
Past Medical History:  
Diagnosis Date  Abscess 12/1/2016  Anemia NEC  Bipolar 1 disorder ([address]  Breast tenderness   
 bilateral, since 11/2016, on and off  Depression  Dizziness  Gastrointestinal disorder   
 pancreatitis  Increased frequency of headaches  Kidney stones 10/2012  Nausea  Nipple discharge 2009  
 white/clear discharge since birth of child  Other ill-defined conditions(799.89) sickle cell trait  Other ill-defined conditions(799.89)   
 anemia  Psychiatric disorder   
 depression, bi-polar  Psychotic disorder ([address] bipolar 1  
 Sickle cell trait ([address]  Sickle cell trait ([address]  Stomach pain PSH: 
Past Surgical History:  
Procedure Laterality Date  HX GYN    
 tubaligation  HX GYN    
 ectopic pregnancy surgery  HX OTHER SURGICAL    
 hx of blood transfusions Social History: 
Social History Tobacco Use  Smoking status: Current Every Day Smoker Packs/day: 0.25 Years: 9.00 Pack years: 2.25  Smokeless tobacco: Never Used Substance Use Topics  Alcohol use: No  
  
Family History: 
Family History Problem Relation Age of Onset  Heart Disease Father Medications: (reviewed) Current Outpatient Medications Medication Sig  
 docusate sodium (COLACE) 100 mg capsule Take 1 Cap by mouth two (2) times a day.  naproxen (NAPROSYN) 500 mg tablet Take 1 Tab by mouth two (2) times daily (with meals).  ketorolac (TORADOL) 10 mg tablet 10 mg. No current facility-administered medications for this visit. Allergies: (reviewed) Allergies Allergen Reactions  Latex Itching  Cetirizine Vertigo Other reaction(s): Bradycardia  Tramadol Vertigo  Naproxen Vertigo OBJECTIVE: 
 
Physical Exam: VITAL SIGNS: Vitals:  
 08/14/20 1050 BP: (!) 128/91 Pulse: 100 Weight: 140 lb 12.8 oz (63.9 kg) Height: 5' 7.01\" (1.702 m) Body mass index is 22.05 kg/m². GENERAL GARETH: Conversant, alert, oriented. No acute distress. HEENT: HEENT. No thyroid enlargement. No JVD. Neck: Supple without restrictions. RESPIRATORY: Clear to auscultation and percussion to the bases. No CVAT. CARDIOVASC: RRR without murmur/rub. GASTROINT: soft, non-tender, without masses or organomegaly MUSCULOSKEL: no joint tenderness, deformity or swelling EXTREMITIES: extremities normal, atraumatic, no cyanosis or edema PELVIC: Normal external genitalia. Normal vagina. Normal cervix. Unable to delineate between uterus and and right adnexal mass. Limited mobility. Cul de sac nodularity noted. Patient very uncomfortable with manipulation of the gynecologic organs. RECTAL: Deferred DON SURVEY: No suspicious lymphadenopathy or edema noted. NEURO: Grossly intact. No acute deficit. Lab Data: 
 
Lab Results Component Value Date/Time WBC 6.9 06/30/2020 09:07 PM  
 HGB 10.5 (L) 06/30/2020 09:07 PM  
 HCT 31.5 (L) 06/30/2020 09:07 PM  
 PLATELET 504 36/98/5656 09:07 PM  
 MCV 78.9 (L) 06/30/2020 09:07 PM  
 
Lab Results Component Value Date/Time  Sodium 137 06/30/2020 09:07 PM  
 Potassium 4.1 06/30/2020 09:07 PM  
 Chloride 106 06/30/2020 09:07 PM  
 CO2 26 06/30/2020 09:07 PM  
 Anion gap 5 06/30/2020 09:07 PM  
 Glucose 84 06/30/2020 09:07 PM  
 BUN 14 06/30/2020 09:07 PM  
 Creatinine 0.93 06/30/2020 09:07 PM  
 BUN/Creatinine ratio 15 06/30/2020 09:07 PM  
 GFR est AA >60 06/30/2020 09:07 PM  
 GFR est non-AA >60 06/30/2020 09:07 PM  
 Calcium 9.2 06/30/2020 09:07 PM  
 
Lab Results Component Value Date/Time Cancer Ag (CA) 125 358.0 (H) 08/06/2020 02:06 PM  
 
 
CT of abdomen/pelvis (7/9/19) LUNG BASES: No abnormality. LIVER: Small hypodensity right hepatic lobe, unchanged likely a cyst but too 
small to characterize. GALLBLADDER: Unremarkable. SPLEEN: No enlargement or lesion. PANCREAS: No mass or ductal dilatation. ADRENALS: No mass. KIDNEYS: Normal left kidney. Dilated right renal collecting system and renal 
pelvis which contains small amount of high density material in the central renal 
pelvis and ureteropelvic junction. No ureteral calculus identified. GI TRACT:  No bowel obstruction. Difficult to assess bowel wall thickening given 
lack of oral contrast material. 
PERITONEUM: No free air or free fluid. APPENDIX: Not clearly visualized. RETROPERITONEUM: No aortic aneurysm. LYMPH NODES:  None enlarged. ADDITIONAL COMMENTS: N/A. 
  
URINARY BLADDER: Unremarkable. REPRODUCTIVE ORGANS: Uterus is present. Large, complex right ovarian lesion 
measures 7.8 x 6.2 cm. 3.7 x 3.3 cm left ovarian lesion likely a cyst. 
LYMPH NODES:  None enlarged. FREE FLUID:  None. BONES: No destructive bone lesion. ADDITIONAL COMMENTS: N/A. 
  
IMPRESSION: 
1. High density material within the mildly dilated right renal collecting 
system/pelvis, likely blood. No obstructing calculus identified. Correlation/follow-up recommended. 2. Large complex right ovarian lesion may represent a hemorrhagic cyst, 7.8 x 
6.2 cm. Smaller left ovarian lesion likely a cyst, 3.7 x 3.3 cm. Abdominal ultrasound (1/5/20) LIVER:  
The liver is normal in echotexture with no mass or other focal abnormality.  
  
LIVER VASCULATURE: The portal vein flow is antegrade. 
  
GALLBLADDER: 
The gallbladder is normal and no stones are identified.  There is no wall 
thickening or fluid around the gallbladder.  
  
 COMMON BILE DUCT: 
There is no biliary duct dilatation and the common duct measures 2 mm in 
diameter.  
  
PANCREAS: 
The visualized pancreas is normal. 
  
SPLEEN: 
The spleen is normal in echotexture and size and measures 8.9 cm in length. 
  
RIGHT KIDNEY: 
The right kidney demonstrates prominent hydronephrosis similar to CT Abdomen 
omen 7/9/2019, etiology not defined. Cortical thickness and echogenicity are 
normal. The right kidney measures 14.3 cm in length. 
  
LEFT KIDNEY: 
The left kidney demonstrates normal echogenicity with no mass, stone or 
hydronephrosis. The left kidney measures 13.1 cm in length.  
  
RETROPERITONEUM: 
The aorta tapers normally. The IVC is normal. 
There is no ascites. IMPRESSION:  
1. Right hydronephrosis of uncertain etiology, similar to prior CT. 2. Otherwise unremarkable. Pelvic ultrasound (1/5/20) PELVIC SONOGRAM  is performed through the unremarkable urinary bladder. 
  
The uterus is normal in size, contour and echotexture with no evidence for 
fibroids. Uterus measures approximately 7.7 x 5.8 x 4.3 cm. Endometrial stripe 
is not significantly thickened, measuring 9 mm by this technique. 
  
Ovaries show a complex cyst on each side. Right cyst measures approximately 6.5 
x 6.1 x 4.9 cm. Left cyst measures approximately 4.1 x 3.4 x 2.9 cm. Right ovary 
measures 8.6 x 6.3 x 5.4 cm and left ovary measures 4.8 x 4.2 x 3.3 cm. There is 
no significant free fluid in the pelvic cul-de-sac. 
  
TRANSVAGINAL SONOGRAM  is performed following Pelvic Sonogram  in order to more 
accurately measure the endometrial thickness and to more adequately delineate 
ovarian anatomy. 
  
The uterus is normal in echotexture with no evidence for fibroids. Endometrial 
stripe measures 4 mm.  
  
Right ovarian complex cyst by this technique measures approximately 5.8 x 5.7 x 
5.3 cm. Left ovarian complex cyst by this technique measures 3.1 x 2.9 x 2.6 cm. Right ovary measures 3.6 x 7.2 x 6.3 cm and left ovary 4.3 x 3.3 x 3.2 cm. Flow 
is preserved in both ovaries. 
  
IMPRESSION:  
1. 5.8 cm right ovarian cyst, probably hemorrhagic cyst, measured 7.3 cm 
previously. 2. 3.1 cm complex left ovarian cyst, probably hemorrhagic cyst, measures 3.4 cm Previously. CT of abdomen/pelvis (6/30/20) LOWER THORAX: 3 mm nodule left upper lobe unchanged LIVER: No mass. BILIARY TREE: Collapsed CBD is not dilated. SPLEEN: within normal limits. PANCREAS: No focal abnormality. ADRENALS: Unremarkable. KIDNEYS/URETERS: Severe right-sided hydronephrosis and hydroureter. An 
obstructing stone is not identified. Obstruction is at the level of the distal 
ureter in the right adnexa. No left-sided hydronephrosis or stone STOMACH: Unremarkable. SMALL BOWEL: No dilatation or wall thickening. COLON: No dilatation or wall thickening. APPENDIX: not identified but there are no secondary signs of appendicitis. PERITONEUM: No ascites or pneumoperitoneum. RETROPERITONEUM: No lymphadenopathy or aortic aneurysm. REPRODUCTIVE ORGANS: Uterus is not enlarged. Bilateral pelvic masses are again 
noted. The right pelvic mass has enlarged 9.9 x 8.0 x 9.8 cm. It is high density 
with multiple septations. The left ovarian high density cyst measures 4.4 x 4.1 
x 4.6 cm URINARY BLADDER: No mass or calculus. BONES: No destructive bone lesion. ABDOMINAL WALL: No mass or hernia. ADDITIONAL COMMENTS: N/A 
  
IMPRESSION: 
Enlarging right adnexal complex cystic mass. On previous ultrasound there is 
internal debris within the lesion. While this may represent an enlarging 
hemorrhagic cyst or endometrioma low-grade neoplasm cannot be excluded. This is 
the cause of the patient's severe right-sided hydronephrosis. The left ovarian 
complex cyst has minimally enlarged as well. Follow-up with GYN is recommended Pelvic ultrasound (6/30/20) PELVIC ULTRASOUND:  
 Realtime sonographic imaging of the pelvis was performed transabdominally. The 
uterus measures 8.8 x 4.9 cm and is normal in appearance. The right ovary 
measures 7.5 x 7.7 x 8.9 cm and the left ovary measures 4.9 x 3.8 x 3.8 cm. They 
are ovaries normal in appearance. Largest right adnexal cyst 7.9 x 6.2 x 6 cm No 
free fluid. No adnexal mass lesion. 
  
IMPRESSION:  
Large ovarian cysts. Correlation with transvaginal ultrasound will be performed. 
  
TRANSVAGINAL ULTRASOUND: 
Realtime sonographic imaging of the pelvis was performed transvaginally. The 
uterus  is normal in appearance. The endometrial stripe measures 9 mm. The right ovary measures 8.2 x 7.1 cm and the left ovary measures 5.2 x 3.8 cm. There is normal flow identified to the ovaries. The ovaries are normal in 
appearance. Right adnexal cyst measures 7.9 x 6.2 x 6 cm in size. 
  
IMPRESSION:  
Large ovarian cysts. IMPRESSION/PLAN: 
Fidelina Maldonado is a 27 y.o. female with a working diagnosis of a complex pelvic mass and elevated CA-125. I reviewed with Fidelina Maldonado her medical records, physical exam, and review of symptoms. An ovarian malignancy cannot be excluded, but I suspect this is secondary to severe pelvic endometriosis. The mass has not really changed in size or character since her CT in July 2019, however, the right hydronephrosis has increased. I suspect this is due to compression of the ureter by the mass or due to inflammation from worsening hydronephrosis. Removing the mass should relieve this and I doubt a stent will be necessary. I explained to her that he cure for endometriosis is resection of disease and rendering her menopausal by removing both ovaries. I also recommend a hysterectomy as well, as it appears to be intimately involved with the right adnexal mass. I explained that surgery for endometriosis can often be as difficult as cancer surgery.   I also explained that there is a possibility that we might have to resect endometriosis involving the gastrointestinal tract. We will try to accomplish the procedure laparoscopically, but she is aware that we might have to convert to a laparotomy. Hopefully, if we do, we would be able to perform the procedure through  Pfannenstiel incision. She was counseled on the risks, benefits, indications, and alternatives of surgery. Her questions were answered and she wishes to proceed as planned. Signed By: Mel Montaño MD   
 8/14/2020/7:26 AM  
 
 
New patient referred by Ana Lilia Burgos for ovarian masses and elevated ca-125. Patient reports abdominal pain at a \"8\" today. If you have questions, please do not hesitate to call me. I look forward to following MsLuma Eriberto along with you.  
 
 
 
Sincerely, 
 
 
Mel Montaño MD

## 2020-08-14 NOTE — PROGRESS NOTES
New patient referred by Yoselyn Rice for ovarian masses and elevated ca-125. Patient reports abdominal pain at a \"8\" today.

## 2020-08-17 ENCOUNTER — HOSPITAL ENCOUNTER (OUTPATIENT)
Dept: PREADMISSION TESTING | Age: 35
Discharge: HOME | End: 2020-08-17
Payer: MEDICAID

## 2020-08-17 VITALS
HEIGHT: 68 IN | DIASTOLIC BLOOD PRESSURE: 79 MMHG | HEART RATE: 91 BPM | TEMPERATURE: 98.5 F | SYSTOLIC BLOOD PRESSURE: 116 MMHG | BODY MASS INDEX: 21.48 KG/M2 | WEIGHT: 141.76 LBS

## 2020-08-17 LAB
ALBUMIN SERPL-MCNC: 3.9 G/DL (ref 3.5–5)
ALBUMIN/GLOB SERPL: 1.1 {RATIO} (ref 1.1–2.2)
ALP SERPL-CCNC: 78 U/L (ref 45–117)
ALT SERPL-CCNC: 10 U/L (ref 12–78)
ANION GAP SERPL CALC-SCNC: 7 MMOL/L (ref 5–15)
AST SERPL-CCNC: 6 U/L (ref 15–37)
BASOPHILS # BLD: 0.1 K/UL (ref 0–0.1)
BASOPHILS NFR BLD: 1 % (ref 0–1)
BILIRUB SERPL-MCNC: 0.6 MG/DL (ref 0.2–1)
BUN SERPL-MCNC: 11 MG/DL (ref 6–20)
BUN/CREAT SERPL: 12 (ref 12–20)
CALCIUM SERPL-MCNC: 9.5 MG/DL (ref 8.5–10.1)
CHLORIDE SERPL-SCNC: 106 MMOL/L (ref 97–108)
CO2 SERPL-SCNC: 27 MMOL/L (ref 21–32)
CREAT SERPL-MCNC: 0.9 MG/DL (ref 0.55–1.02)
DIFFERENTIAL METHOD BLD: ABNORMAL
EOSINOPHIL # BLD: 0.4 K/UL (ref 0–0.4)
EOSINOPHIL NFR BLD: 6 % (ref 0–7)
ERYTHROCYTE [DISTWIDTH] IN BLOOD BY AUTOMATED COUNT: 16.3 % (ref 11.5–14.5)
GLOBULIN SER CALC-MCNC: 3.5 G/DL (ref 2–4)
GLUCOSE SERPL-MCNC: 77 MG/DL (ref 65–100)
HCT VFR BLD AUTO: 32.5 % (ref 35–47)
HGB BLD-MCNC: 10.7 G/DL (ref 11.5–16)
IMM GRANULOCYTES # BLD AUTO: 0 K/UL (ref 0–0.04)
IMM GRANULOCYTES NFR BLD AUTO: 0 % (ref 0–0.5)
LYMPHOCYTES # BLD: 1.6 K/UL (ref 0.8–3.5)
LYMPHOCYTES NFR BLD: 28 % (ref 12–49)
MCH RBC QN AUTO: 26.2 PG (ref 26–34)
MCHC RBC AUTO-ENTMCNC: 32.9 G/DL (ref 30–36.5)
MCV RBC AUTO: 79.7 FL (ref 80–99)
MONOCYTES # BLD: 0.4 K/UL (ref 0–1)
MONOCYTES NFR BLD: 6 % (ref 5–13)
NEUTS SEG # BLD: 3.4 K/UL (ref 1.8–8)
NEUTS SEG NFR BLD: 59 % (ref 32–75)
NRBC # BLD: 0 K/UL (ref 0–0.01)
NRBC BLD-RTO: 0 PER 100 WBC
PLATELET # BLD AUTO: 247 K/UL (ref 150–400)
PMV BLD AUTO: 11.9 FL (ref 8.9–12.9)
POTASSIUM SERPL-SCNC: 3.7 MMOL/L (ref 3.5–5.1)
PROT SERPL-MCNC: 7.4 G/DL (ref 6.4–8.2)
RBC # BLD AUTO: 4.08 M/UL (ref 3.8–5.2)
SODIUM SERPL-SCNC: 140 MMOL/L (ref 136–145)
WBC # BLD AUTO: 5.8 K/UL (ref 3.6–11)

## 2020-08-17 PROCEDURE — 85025 COMPLETE CBC W/AUTO DIFF WBC: CPT

## 2020-08-17 PROCEDURE — 36415 COLL VENOUS BLD VENIPUNCTURE: CPT

## 2020-08-17 PROCEDURE — 80053 COMPREHEN METABOLIC PANEL: CPT

## 2020-08-17 NOTE — PERIOP NOTES
Patient given surgical site infection information FAQs handout and hand hygiene tips sheet. Pre-operative instructions reviewed and patient verbalizes understanding of instructions. Patient has been given the opportunity to ask additional questions. Pre-Operative Instructions    DO NOT EAT OR DRINK ANYTHING AFTER MIDNIGHT THE NIGHT BEFORE SURGERY. PATIENT GIVEN 2 BOTTLES OF CHG SOAP TO USE DAY BEFORE SURGERY AND DOS. INSTRUCTIONS PROVIDED. PT INSTRUCTED TO GO TO CELL PHONE LOT DOS AND IS SCHEDULED FOR COVID TESTING.

## 2020-08-18 ENCOUNTER — ANESTHESIA EVENT (OUTPATIENT)
Dept: SURGERY | Age: 35
End: 2020-08-18
Payer: MEDICAID

## 2020-08-20 ENCOUNTER — HOSPITAL ENCOUNTER (OUTPATIENT)
Dept: PREADMISSION TESTING | Age: 35
Discharge: HOME | End: 2020-08-20
Payer: MEDICAID

## 2020-08-20 DIAGNOSIS — Z01.812 PRE-PROCEDURE LAB EXAM: ICD-10-CM

## 2020-08-20 PROCEDURE — 87635 SARS-COV-2 COVID-19 AMP PRB: CPT

## 2020-08-21 LAB — SARS-COV-2, COV2NT: NOT DETECTED

## 2020-08-23 NOTE — H&P
[address] [place] [address] Mathias Moritz [facility] [address] [facility]  P [phone]  F [phone]        Patient ID:   Name:  Vandana Milton  MRN:  [id number]  :  1985/34 y.o. Date:  2020      HISTORY OF PRESENT ILLNESS:  Vandana Milton is a 27 y.o.  premenopausal female who is being seen for bilateral complex ovarian masses and a markedly elevated CA-125. She is referred by Dr. Mannie Moore. The right ovary is markedly enlarged, containing a high density cyst with multiple septations. This is causing right-sided hydronephrosis. The hydroureter has been present since at least 2019. The left ovary contains a smaller, but similar appearing cyst.  A CA-125 was drawn and found to be elevated at >300. Other tumor markers, including AFP, HCG, LDH, inhibin A, inhibin B, AMH, DHEAS, and testosterone were negative. I have been asked to see her in consultation for further evaluation and management. She was seen in the ER a month ago with hematuria. That is when the CT scan, as well as pelvic ultrasound, were done. ROS:   and GI review:  Negative  Cardiopulmonary review:  Negative   Musculoskeletal:  Negative    A comprehensive review of systems was negative except for that written in the History of Present Illness. , 10 point ROS      OB/GYN ROS:  [id number]  Vaginal delivery x 4  Hx of ectopic pregnancy  Hx of BTL      Problem List:  Patient Active Problem List    Diagnosis Date Noted    UTI (urinary tract infection) 2020    Complex cyst of right ovary 2019    Pain in pelvis 2019    Abscess 2016    [facility] of [facility] 2016    H/O schizophrenia 2016    Myalgia 2016    Perennial allergic rhinitis 2016    Iron deficiency 2015    Elevated cancer antigen 125 (CA-125) 2015    Nausea and vomiting 2013    Anemia 01/10/2013    Sickle cell trait ([facility] 2012    Cyst of ovary 10/24/2012     PMH:  Past Medical History:   Diagnosis Date    Abscess 12/1/2016    Anemia NEC     Bipolar 1 disorder ([facility]     Breast tenderness     bilateral, since 11/2016, on and off     Cancer [facility]) 2008    CERVICAL    Depression     Dizziness     Gastrointestinal disorder     pancreatitis    GERD (gastroesophageal reflux disease)     Increased frequency of headaches     Kidney stones 10/2012    Nausea     Nipple discharge 2009    white/clear discharge since birth of child     Other ill-defined conditions(799.89)     sickle cell trait    Other ill-defined conditions(799.89)     anemia    Psychiatric disorder     depression, bi-polar    Psychotic disorder ([address]     bipolar 1    Sickle cell trait ([address]     Sickle cell trait ([facility]     Stomach pain       PSH:  Past Surgical History:   Procedure Laterality Date    HX GYN      tubaligation    HX GYN      ectopic pregnancy surgery    HX OTHER SURGICAL      hx of blood transfusions      Social History:  Social History     Tobacco Use    Smoking status: Current Every Day Smoker     Packs/day: 0.25     Years: 9.00     Pack years: 2.25    Smokeless tobacco: Never Used   Substance Use Topics    Alcohol use: No      Family History:  Family History   Problem Relation Age of Onset    Heart Disease Father     Anesth Problems Neg Hx       Medications: (reviewed)  No current facility-administered medications for this encounter. Current Outpatient Medications   Medication Sig    docusate sodium (COLACE) 100 mg capsule Take 1 Cap by mouth two (2) times a day. Allergies: (reviewed)  Allergies   Allergen Reactions    Latex Itching    Cetirizine Vertigo     Other reaction(s): Bradycardia    Tramadol Vertigo    Naproxen Vertigo          OBJECTIVE:    Physical Exam:  VITAL SIGNS: There were no vitals filed for this visit. There is no height or weight on file to calculate BMI. GENERAL GARETH: Conversant, alert, oriented.  No acute distress. HEENT: HEENT. No thyroid enlargement. No JVD. Neck: Supple without restrictions. RESPIRATORY: Clear to auscultation and percussion to the bases. No CVAT. CARDIOVASC: RRR without murmur/rub. GASTROINT: soft, non-tender, without masses or organomegaly   MUSCULOSKEL: no joint tenderness, deformity or swelling   EXTREMITIES: extremities normal, atraumatic, no cyanosis or edema   PELVIC: Normal external genitalia. Normal vagina. Normal cervix. Unable to delineate between uterus and and right adnexal mass. Limited mobility. Cul de sac nodularity noted. Patient very uncomfortable with manipulation of the gynecologic organs. RECTAL: Deferred   DON SURVEY: No suspicious lymphadenopathy or edema noted. NEURO: Grossly intact. No acute deficit. Lab Data:    Lab Results   Component Value Date/Time    WBC 5.8 08/17/2020 02:39 PM    HGB 10.7 (L) 08/17/2020 02:39 PM    HCT 32.5 (L) 08/17/2020 02:39 PM    PLATELET 554 42/71/0191 02:39 PM    MCV 79.7 (L) 08/17/2020 02:39 PM     Lab Results   Component Value Date/Time    Sodium 140 08/17/2020 02:39 PM    Potassium 3.7 08/17/2020 02:39 PM    Chloride 106 08/17/2020 02:39 PM    CO2 27 08/17/2020 02:39 PM    Anion gap 7 08/17/2020 02:39 PM    Glucose 77 08/17/2020 02:39 PM    BUN 11 08/17/2020 02:39 PM    Creatinine 0.90 08/17/2020 02:39 PM    BUN/Creatinine ratio 12 08/17/2020 02:39 PM    GFR est AA >60 08/17/2020 02:39 PM    GFR est non-AA >60 08/17/2020 02:39 PM    Calcium 9.5 08/17/2020 02:39 PM     Lab Results   Component Value Date/Time    Cancer Ag (CA) 125 358.0 (H) 08/06/2020 02:06 PM       CT of abdomen/pelvis (7/9/19)  LUNG BASES: No abnormality. LIVER: Small hypodensity right hepatic lobe, unchanged likely a cyst but too  small to characterize. GALLBLADDER: Unremarkable. SPLEEN: No enlargement or lesion. PANCREAS: No mass or ductal dilatation. ADRENALS: No mass. KIDNEYS: Normal left kidney.  Dilated right renal collecting system and renal  pelvis which contains small amount of high density material in the central renal  pelvis and ureteropelvic junction. No ureteral calculus identified. GI TRACT:  No bowel obstruction. Difficult to assess bowel wall thickening given  lack of oral contrast material.  PERITONEUM: No free air or free fluid. APPENDIX: Not clearly visualized. RETROPERITONEUM: No aortic aneurysm. LYMPH NODES:  None enlarged. ADDITIONAL COMMENTS: N/A.     URINARY BLADDER: Unremarkable. REPRODUCTIVE ORGANS: Uterus is present. Large, complex right ovarian lesion  measures 7.8 x 6.2 cm. 3.7 x 3.3 cm left ovarian lesion likely a cyst.  LYMPH NODES:  None enlarged. FREE FLUID:  None. BONES: No destructive bone lesion. ADDITIONAL COMMENTS: N/A.     IMPRESSION:  1. High density material within the mildly dilated right renal collecting  system/pelvis, likely blood. No obstructing calculus identified. Correlation/follow-up recommended. 2. Large complex right ovarian lesion may represent a hemorrhagic cyst, 7.8 x  6.2 cm. Smaller left ovarian lesion likely a cyst, 3.7 x 3.3 cm. Abdominal ultrasound (1/5/20)  LIVER:   The liver is normal in echotexture with no mass or other focal abnormality.      LIVER VASCULATURE:   The portal vein flow is antegrade.     GALLBLADDER:  The gallbladder is normal and no stones are identified. There is no wall  thickening or fluid around the gallbladder.      COMMON BILE DUCT:  There is no biliary duct dilatation and the common duct measures 2 mm in  diameter.      PANCREAS:  The visualized pancreas is normal.     SPLEEN:  The spleen is normal in echotexture and size and measures 8.9 cm in length.     RIGHT KIDNEY:  The right kidney demonstrates prominent hydronephrosis similar to CT Abdomen  omen 7/9/2019, etiology not defined.  Cortical thickness and echogenicity are  normal. The right kidney measures 14.3 cm in length.     LEFT KIDNEY:  The left kidney demonstrates normal echogenicity with no mass, stone or  hydronephrosis. The left kidney measures 13.1 cm in length.      RETROPERITONEUM:  The aorta tapers normally. The IVC is normal.  There is no ascites. IMPRESSION:   1. Right hydronephrosis of uncertain etiology, similar to prior CT. 2. Otherwise unremarkable. Pelvic ultrasound (1/5/20)  PELVIC SONOGRAM  is performed through the unremarkable urinary bladder.     The uterus is normal in size, contour and echotexture with no evidence for  fibroids. Uterus measures approximately 7.7 x 5.8 x 4.3 cm. Endometrial stripe  is not significantly thickened, measuring 9 mm by this technique.     Ovaries show a complex cyst on each side. Right cyst measures approximately 6.5  x 6.1 x 4.9 cm. Left cyst measures approximately 4.1 x 3.4 x 2.9 cm. Right ovary  measures 8.6 x 6.3 x 5.4 cm and left ovary measures 4.8 x 4.2 x 3.3 cm. There is  no significant free fluid in the pelvic cul-de-sac.     TRANSVAGINAL SONOGRAM  is performed following Pelvic Sonogram  in order to more  accurately measure the endometrial thickness and to more adequately delineate  ovarian anatomy.     The uterus is normal in echotexture with no evidence for fibroids. Endometrial  stripe measures 4 mm.      Right ovarian complex cyst by this technique measures approximately 5.8 x 5.7 x  5.3 cm. Left ovarian complex cyst by this technique measures 3.1 x 2.9 x 2.6 cm. Right ovary measures 3.6 x 7.2 x 6.3 cm and left ovary 4.3 x 3.3 x 3.2 cm. Flow  is preserved in both ovaries.     IMPRESSION:   1. 5.8 cm right ovarian cyst, probably hemorrhagic cyst, measured 7.3 cm  previously. 2. 3.1 cm complex left ovarian cyst, probably hemorrhagic cyst, measures 3.4 cm  Previously. CT of abdomen/pelvis (6/30/20)  LOWER THORAX: 3 mm nodule left upper lobe unchanged  LIVER: No mass. BILIARY TREE: Collapsed CBD is not dilated. SPLEEN: within normal limits. PANCREAS: No focal abnormality. ADRENALS: Unremarkable.   KIDNEYS/URETERS: Severe right-sided hydronephrosis and hydroureter. An  obstructing stone is not identified. Obstruction is at the level of the distal  ureter in the right adnexa. No left-sided hydronephrosis or stone  STOMACH: Unremarkable. SMALL BOWEL: No dilatation or wall thickening. COLON: No dilatation or wall thickening. APPENDIX: not identified but there are no secondary signs of appendicitis. PERITONEUM: No ascites or pneumoperitoneum. RETROPERITONEUM: No lymphadenopathy or aortic aneurysm. REPRODUCTIVE ORGANS: Uterus is not enlarged. Bilateral pelvic masses are again  noted. The right pelvic mass has enlarged 9.9 x 8.0 x 9.8 cm. It is high density  with multiple septations. The left ovarian high density cyst measures 4.4 x 4.1  x 4.6 cm  URINARY BLADDER: No mass or calculus. BONES: No destructive bone lesion. ABDOMINAL WALL: No mass or hernia. ADDITIONAL COMMENTS: N/A     IMPRESSION:  Enlarging right adnexal complex cystic mass. On previous ultrasound there is  internal debris within the lesion. While this may represent an enlarging  hemorrhagic cyst or endometrioma low-grade neoplasm cannot be excluded. This is  the cause of the patient's severe right-sided hydronephrosis. The left ovarian  complex cyst has minimally enlarged as well. Follow-up with GYN is recommended      Pelvic ultrasound (6/30/20)  PELVIC ULTRASOUND:   Realtime sonographic imaging of the pelvis was performed transabdominally. The  uterus measures 8.8 x 4.9 cm and is normal in appearance. The right ovary  measures 7.5 x 7.7 x 8.9 cm and the left ovary measures 4.9 x 3.8 x 3.8 cm. They  are ovaries normal in appearance. Largest right adnexal cyst 7.9 x 6.2 x 6 cm No  free fluid. No adnexal mass lesion.     IMPRESSION:   Large ovarian cysts. Correlation with transvaginal ultrasound will be performed.     TRANSVAGINAL ULTRASOUND:  Realtime sonographic imaging of the pelvis was performed transvaginally. The  uterus  is normal in appearance.    The endometrial stripe measures 9 mm. The right ovary measures 8.2 x 7.1 cm and the left ovary measures 5.2 x 3.8 cm. There is normal flow identified to the ovaries. The ovaries are normal in  appearance. Right adnexal cyst measures 7.9 x 6.2 x 6 cm in size.     IMPRESSION:   Large ovarian cysts. IMPRESSION/PLAN:  Sherrill Weber is a 27 y.o. female with a working diagnosis of a complex pelvic mass and elevated CA-125. I reviewed with Sherrill Weber her medical records, physical exam, and review of symptoms. An ovarian malignancy cannot be excluded, but I suspect this is secondary to severe pelvic endometriosis. The mass has not really changed in size or character since her CT in July 2019, however, the right hydronephrosis has increased. I suspect this is due to compression of the ureter by the mass or due to inflammation from worsening hydronephrosis. Removing the mass should relieve this and I doubt a stent will be necessary. I explained to her that he cure for endometriosis is resection of disease and rendering her menopausal by removing both ovaries. I also recommend a hysterectomy as well, as it appears to be intimately involved with the right adnexal mass. I explained that surgery for endometriosis can often be as difficult as cancer surgery. I also explained that there is a possibility that we might have to resect endometriosis involving the gastrointestinal tract. We will try to accomplish the procedure laparoscopically, but she is aware that we might have to convert to a laparotomy. Hopefully, if we do, we would be able to perform the procedure through  Pfannenstiel incision. She was counseled on the risks, benefits, indications, and alternatives of surgery. Her questions were answered and she wishes to proceed as planned. Signed By: Nate Angel MD     8/23/2020/7:26 AM       Date of Surgery Update:  Sherrill Weber was seen and examined. History and physical has been reviewed. The patient has been examined. There have been no significant clinical changes since the completion of the originally dated History and Physical.  Patient identified by surgeon; surgical site was confirmed by patient and surgeon.     Signed By: Alesha Lacy MD     August 24, 2020 11:23 AM

## 2020-08-24 ENCOUNTER — ANESTHESIA (OUTPATIENT)
Dept: SURGERY | Age: 35
End: 2020-08-24
Payer: MEDICAID

## 2020-08-24 ENCOUNTER — HOSPITAL ENCOUNTER (OUTPATIENT)
Age: 35
Setting detail: OBSERVATION
Discharge: HOME | End: 2020-08-25
Attending: OBSTETRICS & GYNECOLOGY | Admitting: OBSTETRICS & GYNECOLOGY
Payer: MEDICAID

## 2020-08-24 DIAGNOSIS — N80.9 ENDOMETRIOSIS: Primary | ICD-10-CM

## 2020-08-24 DIAGNOSIS — Z90.710 S/P HYSTERECTOMY: ICD-10-CM

## 2020-08-24 LAB — HCG UR QL: NEGATIVE

## 2020-08-24 PROCEDURE — 77030002882 HC SUT CART KNOT LSIS -B: Performed by: OBSTETRICS & GYNECOLOGY

## 2020-08-24 PROCEDURE — 74011000250 HC RX REV CODE- 250: Performed by: NURSE ANESTHETIST, CERTIFIED REGISTERED

## 2020-08-24 PROCEDURE — 74011250637 HC RX REV CODE- 250/637: Performed by: ANESTHESIOLOGY

## 2020-08-24 PROCEDURE — 77030020061 HC IV BLD WRMR ADMIN SET 3M -B: Performed by: NURSE ANESTHETIST, CERTIFIED REGISTERED

## 2020-08-24 PROCEDURE — 77030031139 HC SUT VCRL2 J&J -A: Performed by: OBSTETRICS & GYNECOLOGY

## 2020-08-24 PROCEDURE — 74011000250 HC RX REV CODE- 250: Performed by: ANESTHESIOLOGY

## 2020-08-24 PROCEDURE — 76010000135 HC OR TIME 4 TO 4.5 HR: Performed by: OBSTETRICS & GYNECOLOGY

## 2020-08-24 PROCEDURE — 99218 HC RM OBSERVATION: CPT

## 2020-08-24 PROCEDURE — 77030036731 HC STPLR ENDOSC J&J -F: Performed by: OBSTETRICS & GYNECOLOGY

## 2020-08-24 PROCEDURE — 76210000017 HC OR PH I REC 1.5 TO 2 HR: Performed by: OBSTETRICS & GYNECOLOGY

## 2020-08-24 PROCEDURE — 77030040361 HC SLV COMPR DVT MDII -B: Performed by: OBSTETRICS & GYNECOLOGY

## 2020-08-24 PROCEDURE — 74011250637 HC RX REV CODE- 250/637: Performed by: OBSTETRICS & GYNECOLOGY

## 2020-08-24 PROCEDURE — 74011000258 HC RX REV CODE- 258: Performed by: OBSTETRICS & GYNECOLOGY

## 2020-08-24 PROCEDURE — 77030033162 HC PCH SPEC RTVR ENDOSC AMR -B: Performed by: OBSTETRICS & GYNECOLOGY

## 2020-08-24 PROCEDURE — 77030002968 HC SUT PDS LSIS -B: Performed by: OBSTETRICS & GYNECOLOGY

## 2020-08-24 PROCEDURE — 81025 URINE PREGNANCY TEST: CPT

## 2020-08-24 PROCEDURE — 77030027876 HC STPLR ENDOSC FLX PWR J&J -G1: Performed by: OBSTETRICS & GYNECOLOGY

## 2020-08-24 PROCEDURE — 77030026438 HC STYL ET INTUB CARD -A: Performed by: NURSE ANESTHETIST, CERTIFIED REGISTERED

## 2020-08-24 PROCEDURE — 74011250636 HC RX REV CODE- 250/636: Performed by: NURSE ANESTHETIST, CERTIFIED REGISTERED

## 2020-08-24 PROCEDURE — 77030020829: Performed by: OBSTETRICS & GYNECOLOGY

## 2020-08-24 PROCEDURE — 74011000250 HC RX REV CODE- 250: Performed by: OBSTETRICS & GYNECOLOGY

## 2020-08-24 PROCEDURE — 88331 PATH CONSLTJ SURG 1 BLK 1SPC: CPT

## 2020-08-24 PROCEDURE — 77030008608 HC TRCR ENDOSC SMTH AMR -B: Performed by: OBSTETRICS & GYNECOLOGY

## 2020-08-24 PROCEDURE — 74011250636 HC RX REV CODE- 250/636: Performed by: ANESTHESIOLOGY

## 2020-08-24 PROCEDURE — 88112 CYTOPATH CELL ENHANCE TECH: CPT

## 2020-08-24 PROCEDURE — 77030002904 HC SUT DEV RNNG LSIS -C: Performed by: OBSTETRICS & GYNECOLOGY

## 2020-08-24 PROCEDURE — 77030008756 HC TU IRR SUC STRY -B: Performed by: OBSTETRICS & GYNECOLOGY

## 2020-08-24 PROCEDURE — 77030008684 HC TU ET CUF COVD -B: Performed by: NURSE ANESTHETIST, CERTIFIED REGISTERED

## 2020-08-24 PROCEDURE — 74011000636 HC RX REV CODE- 636: Performed by: NURSE ANESTHETIST, CERTIFIED REGISTERED

## 2020-08-24 PROCEDURE — 77030011640 HC PAD GRND REM COVD -A: Performed by: OBSTETRICS & GYNECOLOGY

## 2020-08-24 PROCEDURE — 77030020263 HC SOL INJ SOD CL0.9% LFCR 1000ML: Performed by: OBSTETRICS & GYNECOLOGY

## 2020-08-24 PROCEDURE — 77030018778 HC MANIP UTER VCAR CNMD -B: Performed by: OBSTETRICS & GYNECOLOGY

## 2020-08-24 PROCEDURE — C1769 GUIDE WIRE: HCPCS | Performed by: OBSTETRICS & GYNECOLOGY

## 2020-08-24 PROCEDURE — 88305 TISSUE EXAM BY PATHOLOGIST: CPT

## 2020-08-24 PROCEDURE — 77030012770 HC TRCR OPT FX AMR -B: Performed by: OBSTETRICS & GYNECOLOGY

## 2020-08-24 PROCEDURE — 77030010507 HC ADH SKN DERMBND J&J -B: Performed by: OBSTETRICS & GYNECOLOGY

## 2020-08-24 PROCEDURE — 76060000039 HC ANESTHESIA 4 TO 4.5 HR: Performed by: OBSTETRICS & GYNECOLOGY

## 2020-08-24 PROCEDURE — 77030039147 HC PWDR HEMSTS SURGICEL JNJ -D: Performed by: OBSTETRICS & GYNECOLOGY

## 2020-08-24 PROCEDURE — 77030040830 HC CATH URETH FOL MDII -A: Performed by: OBSTETRICS & GYNECOLOGY

## 2020-08-24 PROCEDURE — 77030002933 HC SUT MCRYL J&J -A: Performed by: OBSTETRICS & GYNECOLOGY

## 2020-08-24 PROCEDURE — 77030041236 HC APPL SURG ENDO JNJ -B: Performed by: OBSTETRICS & GYNECOLOGY

## 2020-08-24 PROCEDURE — 77030019927 HC TBNG IRR CYSTO BAXT -A: Performed by: OBSTETRICS & GYNECOLOGY

## 2020-08-24 PROCEDURE — 77030033639 HC SHR ENDO COAG HARM 36 J&J -E: Performed by: OBSTETRICS & GYNECOLOGY

## 2020-08-24 PROCEDURE — 77030010031 HC SCIS ENDOSC MPLR J&J -C: Performed by: OBSTETRICS & GYNECOLOGY

## 2020-08-24 PROCEDURE — 88307 TISSUE EXAM BY PATHOLOGIST: CPT

## 2020-08-24 PROCEDURE — 77030040922 HC BLNKT HYPOTHRM STRY -A

## 2020-08-24 PROCEDURE — 77030002903 HC SUT DEV PLCMNT LSIS -C: Performed by: OBSTETRICS & GYNECOLOGY

## 2020-08-24 PROCEDURE — 74011250636 HC RX REV CODE- 250/636: Performed by: OBSTETRICS & GYNECOLOGY

## 2020-08-24 RX ORDER — HYDROMORPHONE HYDROCHLORIDE 2 MG/ML
INJECTION, SOLUTION INTRAMUSCULAR; INTRAVENOUS; SUBCUTANEOUS AS NEEDED
Status: DISCONTINUED | OUTPATIENT
Start: 2020-08-24 | End: 2020-08-24 | Stop reason: HOSPADM

## 2020-08-24 RX ORDER — MIDAZOLAM HYDROCHLORIDE 1 MG/ML
0.5 INJECTION, SOLUTION INTRAMUSCULAR; INTRAVENOUS
Status: DISCONTINUED | OUTPATIENT
Start: 2020-08-24 | End: 2020-08-24 | Stop reason: HOSPADM

## 2020-08-24 RX ORDER — LIDOCAINE HYDROCHLORIDE 20 MG/ML
INJECTION, SOLUTION EPIDURAL; INFILTRATION; INTRACAUDAL; PERINEURAL AS NEEDED
Status: DISCONTINUED | OUTPATIENT
Start: 2020-08-24 | End: 2020-08-24 | Stop reason: HOSPADM

## 2020-08-24 RX ORDER — SODIUM CHLORIDE 9 MG/ML
INJECTION, SOLUTION INTRAVENOUS
Status: DISCONTINUED | OUTPATIENT
Start: 2020-08-24 | End: 2020-08-24 | Stop reason: HOSPADM

## 2020-08-24 RX ORDER — DEXMEDETOMIDINE HYDROCHLORIDE 100 UG/ML
INJECTION, SOLUTION INTRAVENOUS AS NEEDED
Status: DISCONTINUED | OUTPATIENT
Start: 2020-08-24 | End: 2020-08-24 | Stop reason: HOSPADM

## 2020-08-24 RX ORDER — SODIUM CHLORIDE 9 MG/ML
125 INJECTION, SOLUTION INTRAVENOUS CONTINUOUS
Status: DISCONTINUED | OUTPATIENT
Start: 2020-08-24 | End: 2020-08-25

## 2020-08-24 RX ORDER — SODIUM CHLORIDE 9 MG/ML
25 INJECTION, SOLUTION INTRAVENOUS CONTINUOUS
Status: DISCONTINUED | OUTPATIENT
Start: 2020-08-24 | End: 2020-08-24 | Stop reason: HOSPADM

## 2020-08-24 RX ORDER — LORAZEPAM 2 MG/ML
1 INJECTION INTRAMUSCULAR
Status: DISCONTINUED | OUTPATIENT
Start: 2020-08-24 | End: 2020-08-25 | Stop reason: HOSPADM

## 2020-08-24 RX ORDER — LIDOCAINE HYDROCHLORIDE 10 MG/ML
0.1 INJECTION, SOLUTION EPIDURAL; INFILTRATION; INTRACAUDAL; PERINEURAL AS NEEDED
Status: DISCONTINUED | OUTPATIENT
Start: 2020-08-24 | End: 2020-08-24 | Stop reason: HOSPADM

## 2020-08-24 RX ORDER — ACETAMINOPHEN 325 MG/1
650 TABLET ORAL EVERY 6 HOURS
Status: DISCONTINUED | OUTPATIENT
Start: 2020-08-24 | End: 2020-08-25 | Stop reason: HOSPADM

## 2020-08-24 RX ORDER — HYDROMORPHONE HYDROCHLORIDE 1 MG/ML
1 INJECTION, SOLUTION INTRAMUSCULAR; INTRAVENOUS; SUBCUTANEOUS
Status: DISCONTINUED | OUTPATIENT
Start: 2020-08-24 | End: 2020-08-25 | Stop reason: HOSPADM

## 2020-08-24 RX ORDER — SODIUM CHLORIDE, SODIUM LACTATE, POTASSIUM CHLORIDE, CALCIUM CHLORIDE 600; 310; 30; 20 MG/100ML; MG/100ML; MG/100ML; MG/100ML
125 INJECTION, SOLUTION INTRAVENOUS CONTINUOUS
Status: DISCONTINUED | OUTPATIENT
Start: 2020-08-24 | End: 2020-08-24 | Stop reason: HOSPADM

## 2020-08-24 RX ORDER — PROCHLORPERAZINE EDISYLATE 5 MG/ML
10 INJECTION INTRAMUSCULAR; INTRAVENOUS
Status: DISCONTINUED | OUTPATIENT
Start: 2020-08-24 | End: 2020-08-24 | Stop reason: SDUPTHER

## 2020-08-24 RX ORDER — ROCURONIUM BROMIDE 10 MG/ML
INJECTION, SOLUTION INTRAVENOUS AS NEEDED
Status: DISCONTINUED | OUTPATIENT
Start: 2020-08-24 | End: 2020-08-24 | Stop reason: HOSPADM

## 2020-08-24 RX ORDER — FENTANYL CITRATE 50 UG/ML
50 INJECTION, SOLUTION INTRAMUSCULAR; INTRAVENOUS AS NEEDED
Status: DISCONTINUED | OUTPATIENT
Start: 2020-08-24 | End: 2020-08-24 | Stop reason: HOSPADM

## 2020-08-24 RX ORDER — MIDAZOLAM HYDROCHLORIDE 1 MG/ML
1 INJECTION, SOLUTION INTRAMUSCULAR; INTRAVENOUS AS NEEDED
Status: DISCONTINUED | OUTPATIENT
Start: 2020-08-24 | End: 2020-08-24 | Stop reason: HOSPADM

## 2020-08-24 RX ORDER — ACETAMINOPHEN 325 MG/1
650 TABLET ORAL ONCE
Status: COMPLETED | OUTPATIENT
Start: 2020-08-24 | End: 2020-08-24

## 2020-08-24 RX ORDER — ESMOLOL HYDROCHLORIDE 10 MG/ML
INJECTION INTRAVENOUS AS NEEDED
Status: DISCONTINUED | OUTPATIENT
Start: 2020-08-24 | End: 2020-08-24 | Stop reason: HOSPADM

## 2020-08-24 RX ORDER — KETOROLAC TROMETHAMINE 30 MG/ML
30 INJECTION, SOLUTION INTRAMUSCULAR; INTRAVENOUS EVERY 6 HOURS
Status: COMPLETED | OUTPATIENT
Start: 2020-08-24 | End: 2020-08-25

## 2020-08-24 RX ORDER — HYDROMORPHONE HYDROCHLORIDE 1 MG/ML
0.2 INJECTION, SOLUTION INTRAMUSCULAR; INTRAVENOUS; SUBCUTANEOUS
Status: DISCONTINUED | OUTPATIENT
Start: 2020-08-24 | End: 2020-08-24 | Stop reason: HOSPADM

## 2020-08-24 RX ORDER — DIPHENHYDRAMINE HYDROCHLORIDE 50 MG/ML
12.5 INJECTION, SOLUTION INTRAMUSCULAR; INTRAVENOUS
Status: DISCONTINUED | OUTPATIENT
Start: 2020-08-24 | End: 2020-08-25 | Stop reason: HOSPADM

## 2020-08-24 RX ORDER — GLYCOPYRROLATE 0.2 MG/ML
INJECTION INTRAMUSCULAR; INTRAVENOUS AS NEEDED
Status: DISCONTINUED | OUTPATIENT
Start: 2020-08-24 | End: 2020-08-24 | Stop reason: HOSPADM

## 2020-08-24 RX ORDER — KETOROLAC TROMETHAMINE 30 MG/ML
INJECTION, SOLUTION INTRAMUSCULAR; INTRAVENOUS
Status: DISPENSED
Start: 2020-08-24 | End: 2020-08-25

## 2020-08-24 RX ORDER — ONDANSETRON 2 MG/ML
4 INJECTION INTRAMUSCULAR; INTRAVENOUS
Status: DISCONTINUED | OUTPATIENT
Start: 2020-08-24 | End: 2020-08-25 | Stop reason: HOSPADM

## 2020-08-24 RX ORDER — OXYCODONE AND ACETAMINOPHEN 5; 325 MG/1; MG/1
1 TABLET ORAL AS NEEDED
Status: DISCONTINUED | OUTPATIENT
Start: 2020-08-24 | End: 2020-08-24 | Stop reason: HOSPADM

## 2020-08-24 RX ORDER — SODIUM CHLORIDE 0.9 % (FLUSH) 0.9 %
5-40 SYRINGE (ML) INJECTION AS NEEDED
Status: DISCONTINUED | OUTPATIENT
Start: 2020-08-24 | End: 2020-08-25 | Stop reason: HOSPADM

## 2020-08-24 RX ORDER — NEOSTIGMINE METHYLSULFATE 1 MG/ML
INJECTION INTRAVENOUS AS NEEDED
Status: DISCONTINUED | OUTPATIENT
Start: 2020-08-24 | End: 2020-08-24 | Stop reason: HOSPADM

## 2020-08-24 RX ORDER — OXYCODONE HYDROCHLORIDE 5 MG/1
5 TABLET ORAL
Status: DISCONTINUED | OUTPATIENT
Start: 2020-08-24 | End: 2020-08-25 | Stop reason: HOSPADM

## 2020-08-24 RX ORDER — FENTANYL CITRATE 50 UG/ML
INJECTION, SOLUTION INTRAMUSCULAR; INTRAVENOUS AS NEEDED
Status: DISCONTINUED | OUTPATIENT
Start: 2020-08-24 | End: 2020-08-24 | Stop reason: HOSPADM

## 2020-08-24 RX ORDER — LABETALOL HYDROCHLORIDE 5 MG/ML
INJECTION, SOLUTION INTRAVENOUS AS NEEDED
Status: DISCONTINUED | OUTPATIENT
Start: 2020-08-24 | End: 2020-08-24 | Stop reason: HOSPADM

## 2020-08-24 RX ORDER — ONDANSETRON 2 MG/ML
4 INJECTION INTRAMUSCULAR; INTRAVENOUS AS NEEDED
Status: DISCONTINUED | OUTPATIENT
Start: 2020-08-24 | End: 2020-08-24 | Stop reason: HOSPADM

## 2020-08-24 RX ORDER — MIDAZOLAM HYDROCHLORIDE 1 MG/ML
INJECTION, SOLUTION INTRAMUSCULAR; INTRAVENOUS AS NEEDED
Status: DISCONTINUED | OUTPATIENT
Start: 2020-08-24 | End: 2020-08-24 | Stop reason: HOSPADM

## 2020-08-24 RX ORDER — SODIUM CHLORIDE 0.9 % (FLUSH) 0.9 %
5-40 SYRINGE (ML) INJECTION AS NEEDED
Status: DISCONTINUED | OUTPATIENT
Start: 2020-08-24 | End: 2020-08-24 | Stop reason: HOSPADM

## 2020-08-24 RX ORDER — DIPHENHYDRAMINE HYDROCHLORIDE 50 MG/ML
12.5 INJECTION, SOLUTION INTRAMUSCULAR; INTRAVENOUS AS NEEDED
Status: DISCONTINUED | OUTPATIENT
Start: 2020-08-24 | End: 2020-08-24 | Stop reason: HOSPADM

## 2020-08-24 RX ORDER — SODIUM CHLORIDE 0.9 % (FLUSH) 0.9 %
5-40 SYRINGE (ML) INJECTION EVERY 8 HOURS
Status: DISCONTINUED | OUTPATIENT
Start: 2020-08-24 | End: 2020-08-24 | Stop reason: HOSPADM

## 2020-08-24 RX ORDER — FENTANYL CITRATE 50 UG/ML
25 INJECTION, SOLUTION INTRAMUSCULAR; INTRAVENOUS
Status: DISCONTINUED | OUTPATIENT
Start: 2020-08-24 | End: 2020-08-24 | Stop reason: HOSPADM

## 2020-08-24 RX ORDER — DEXAMETHASONE SODIUM PHOSPHATE 4 MG/ML
INJECTION, SOLUTION INTRA-ARTICULAR; INTRALESIONAL; INTRAMUSCULAR; INTRAVENOUS; SOFT TISSUE AS NEEDED
Status: DISCONTINUED | OUTPATIENT
Start: 2020-08-24 | End: 2020-08-24 | Stop reason: HOSPADM

## 2020-08-24 RX ORDER — ONDANSETRON 2 MG/ML
INJECTION INTRAMUSCULAR; INTRAVENOUS AS NEEDED
Status: DISCONTINUED | OUTPATIENT
Start: 2020-08-24 | End: 2020-08-24 | Stop reason: HOSPADM

## 2020-08-24 RX ORDER — SODIUM CHLORIDE 0.9 % (FLUSH) 0.9 %
5-40 SYRINGE (ML) INJECTION EVERY 8 HOURS
Status: DISCONTINUED | OUTPATIENT
Start: 2020-08-24 | End: 2020-08-25 | Stop reason: HOSPADM

## 2020-08-24 RX ORDER — MORPHINE SULFATE 10 MG/ML
2 INJECTION, SOLUTION INTRAMUSCULAR; INTRAVENOUS
Status: DISCONTINUED | OUTPATIENT
Start: 2020-08-24 | End: 2020-08-24 | Stop reason: HOSPADM

## 2020-08-24 RX ORDER — PROPOFOL 10 MG/ML
INJECTION, EMULSION INTRAVENOUS AS NEEDED
Status: DISCONTINUED | OUTPATIENT
Start: 2020-08-24 | End: 2020-08-24 | Stop reason: HOSPADM

## 2020-08-24 RX ADMIN — ONDANSETRON HYDROCHLORIDE 4 MG: 2 INJECTION, SOLUTION INTRAMUSCULAR; INTRAVENOUS at 13:45

## 2020-08-24 RX ADMIN — MIDAZOLAM HYDROCHLORIDE 1 MG: 1 INJECTION, SOLUTION INTRAMUSCULAR; INTRAVENOUS at 13:24

## 2020-08-24 RX ADMIN — LABETALOL HYDROCHLORIDE 5 MG: 5 INJECTION INTRAVENOUS at 15:00

## 2020-08-24 RX ADMIN — SODIUM CHLORIDE, SODIUM LACTATE, POTASSIUM CHLORIDE, AND CALCIUM CHLORIDE: 600; 310; 30; 20 INJECTION, SOLUTION INTRAVENOUS at 16:14

## 2020-08-24 RX ADMIN — SODIUM CHLORIDE: 900 INJECTION, SOLUTION INTRAVENOUS at 13:58

## 2020-08-24 RX ADMIN — ESMOLOL HYDROCHLORIDE 20 MG: 10 INJECTION, SOLUTION INTRAVENOUS at 13:44

## 2020-08-24 RX ADMIN — ROCURONIUM BROMIDE 35 MG: 10 SOLUTION INTRAVENOUS at 13:32

## 2020-08-24 RX ADMIN — LIDOCAINE HYDROCHLORIDE 60 MG: 20 INJECTION, SOLUTION EPIDURAL; INFILTRATION; INTRACAUDAL; PERINEURAL at 13:31

## 2020-08-24 RX ADMIN — MIDAZOLAM 1 MG: 1 INJECTION INTRAMUSCULAR; INTRAVENOUS at 12:26

## 2020-08-24 RX ADMIN — DEXMEDETOMIDINE HYDROCHLORIDE 8 MCG: 100 INJECTION, SOLUTION, CONCENTRATE INTRAVENOUS at 16:46

## 2020-08-24 RX ADMIN — DEXAMETHASONE SODIUM PHOSPHATE 4 MG: 4 INJECTION, SOLUTION INTRAMUSCULAR; INTRAVENOUS at 13:45

## 2020-08-24 RX ADMIN — LIDOCAINE HYDROCHLORIDE 0.1 ML: 10 INJECTION, SOLUTION EPIDURAL; INFILTRATION; INTRACAUDAL; PERINEURAL at 11:11

## 2020-08-24 RX ADMIN — DEXMEDETOMIDINE HYDROCHLORIDE 6 MCG: 100 INJECTION, SOLUTION, CONCENTRATE INTRAVENOUS at 16:56

## 2020-08-24 RX ADMIN — NEOSTIGMINE METHYLSULFATE 3 MG: 1 INJECTION, SOLUTION INTRAVENOUS at 17:01

## 2020-08-24 RX ADMIN — ESMOLOL HYDROCHLORIDE 20 MG: 10 INJECTION, SOLUTION INTRAVENOUS at 14:22

## 2020-08-24 RX ADMIN — ROCURONIUM BROMIDE 5 MG: 10 SOLUTION INTRAVENOUS at 13:31

## 2020-08-24 RX ADMIN — GLYCOPYRROLATE 0.4 MG: 0.2 INJECTION, SOLUTION INTRAMUSCULAR; INTRAVENOUS at 17:01

## 2020-08-24 RX ADMIN — FENTANYL CITRATE 25 MCG: 50 INJECTION INTRAMUSCULAR; INTRAVENOUS at 19:03

## 2020-08-24 RX ADMIN — HYDROMORPHONE HYDROCHLORIDE 1 MG: 2 INJECTION, SOLUTION INTRAMUSCULAR; INTRAVENOUS; SUBCUTANEOUS at 14:37

## 2020-08-24 RX ADMIN — ROCURONIUM BROMIDE 10 MG: 10 SOLUTION INTRAVENOUS at 15:33

## 2020-08-24 RX ADMIN — SODIUM CHLORIDE 125 ML/HR: 900 INJECTION, SOLUTION INTRAVENOUS at 17:49

## 2020-08-24 RX ADMIN — ROCURONIUM BROMIDE 15 MG: 10 SOLUTION INTRAVENOUS at 14:45

## 2020-08-24 RX ADMIN — FENTANYL CITRATE 125 MCG: 50 INJECTION, SOLUTION INTRAMUSCULAR; INTRAVENOUS at 13:31

## 2020-08-24 RX ADMIN — PROPOFOL 30 MG: 10 INJECTION, EMULSION INTRAVENOUS at 13:32

## 2020-08-24 RX ADMIN — MIDAZOLAM HYDROCHLORIDE 2 MG: 1 INJECTION, SOLUTION INTRAMUSCULAR; INTRAVENOUS at 13:18

## 2020-08-24 RX ADMIN — CEFOTETAN DISODIUM 2 G: 2 INJECTION, POWDER, FOR SOLUTION INTRAMUSCULAR; INTRAVENOUS at 13:39

## 2020-08-24 RX ADMIN — LABETALOL HYDROCHLORIDE 5 MG: 5 INJECTION INTRAVENOUS at 14:45

## 2020-08-24 RX ADMIN — ESMOLOL HYDROCHLORIDE 20 MG: 10 INJECTION, SOLUTION INTRAVENOUS at 13:55

## 2020-08-24 RX ADMIN — MIDAZOLAM HYDROCHLORIDE 2 MG: 1 INJECTION, SOLUTION INTRAMUSCULAR; INTRAVENOUS at 13:21

## 2020-08-24 RX ADMIN — ACETAMINOPHEN 650 MG: 325 TABLET ORAL at 11:05

## 2020-08-24 RX ADMIN — KETOROLAC TROMETHAMINE 30 MG: 30 INJECTION, SOLUTION INTRAMUSCULAR at 20:30

## 2020-08-24 RX ADMIN — SODIUM CHLORIDE, SODIUM LACTATE, POTASSIUM CHLORIDE, AND CALCIUM CHLORIDE 125 ML/HR: 600; 310; 30; 20 INJECTION, SOLUTION INTRAVENOUS at 11:11

## 2020-08-24 RX ADMIN — PROPOFOL 150 MG: 10 INJECTION, EMULSION INTRAVENOUS at 13:31

## 2020-08-24 RX ADMIN — CEFAZOLIN SODIUM 1 G: 1 INJECTION, POWDER, FOR SOLUTION INTRAMUSCULAR; INTRAVENOUS at 23:45

## 2020-08-24 RX ADMIN — DEXMEDETOMIDINE HYDROCHLORIDE 6 MCG: 100 INJECTION, SOLUTION, CONCENTRATE INTRAVENOUS at 16:51

## 2020-08-24 RX ADMIN — FENTANYL CITRATE 25 MCG: 50 INJECTION, SOLUTION INTRAMUSCULAR; INTRAVENOUS at 14:21

## 2020-08-24 RX ADMIN — ACETAMINOPHEN 650 MG: 325 TABLET ORAL at 23:45

## 2020-08-24 RX ADMIN — HYDROMORPHONE HYDROCHLORIDE 1 MG: 2 INJECTION, SOLUTION INTRAMUSCULAR; INTRAVENOUS; SUBCUTANEOUS at 13:59

## 2020-08-24 RX ADMIN — FENTANYL CITRATE 50 MCG: 50 INJECTION, SOLUTION INTRAMUSCULAR; INTRAVENOUS at 14:13

## 2020-08-24 RX ADMIN — FENTANYL CITRATE 50 MCG: 50 INJECTION, SOLUTION INTRAMUSCULAR; INTRAVENOUS at 13:56

## 2020-08-24 RX ADMIN — ESMOLOL HYDROCHLORIDE 20 MG: 10 INJECTION, SOLUTION INTRAVENOUS at 14:08

## 2020-08-24 RX ADMIN — FENTANYL CITRATE 50 MCG: 50 INJECTION, SOLUTION INTRAMUSCULAR; INTRAVENOUS at 15:50

## 2020-08-24 RX ADMIN — ROCURONIUM BROMIDE 10 MG: 10 SOLUTION INTRAVENOUS at 14:05

## 2020-08-24 RX ADMIN — LABETALOL HYDROCHLORIDE 5 MG: 5 INJECTION INTRAVENOUS at 14:38

## 2020-08-24 RX ADMIN — METHYLENE BLUE 7.5 ML: 5 INJECTION INTRAVENOUS at 16:17

## 2020-08-24 NOTE — ROUTINE PROCESS
Patient: Ashley Maria MRN: [id number]  SSN: [id number]   YOB: 1985  Age: 27 y.o. Sex: female     Patient is status post Procedure(s):  LAPAROSCOPIC RESECTION OF MASS, TOTAL LAPAROSCOPIC HYSTERECTOMY, BILATERAL SALPINGO-OOPHORECTOMY, APPENDECTOMY, CYSTOSCOPY.     Surgeon(s) and Role:     Jeromy Khan MD - Primary    Local/Dose/Irrigation:  NA                  Peripheral IV 08/24/20 Left Hand (Active)            Airway - Endotracheal Tube 08/24/20 Oral (Active)                   Dressing/Packing:  Wound Abdomen 3 TROCAR SITES-Dressing Type: Topical skin adhesive/glue (08/24/20 1657)  Wound Vagina-Dressing Type: Mary-pad (08/24/20 1657)    Splint/Cast:  ]    Other:  NA

## 2020-08-24 NOTE — ANESTHESIA POSTPROCEDURE EVALUATION
Procedure(s):  LAPAROSCOPIC RESECTION OF MASS, TOTAL LAPAROSCOPIC HYSTERECTOMY, BILATERAL SALPINGO-OOPHORECTOMY, APPENDECTOMY, CYSTOSCOPY. general    Anesthesia Post Evaluation      Multimodal analgesia: multimodal analgesia used between 6 hours prior to anesthesia start to PACU discharge  Patient location during evaluation: bedside  Patient participation: waiting for patient participation  Level of consciousness: awake  Pain management: adequate  Airway patency: patent  Anesthetic complications: no  Cardiovascular status: acceptable  Respiratory status: unassisted  Hydration status: acceptable  Comments: Post-Anesthesia Evaluation and Assessment    I have evaluated the patient and they are ready for PACU discharge. Patient: Carlton Whitley MRN: [id number]  SSN: [id number]   YOB: 1985  Age: 27 y.o. Sex: female      Cardiovascular Function/Vital Signs  /81   Pulse 86   Temp 36 °C (96.8 °F)   Resp 13   Ht 5' 7.5\" (1.715 m)   Wt 64 kg (141 lb)   SpO2 100%   BMI 21.76 kg/m²     Patient is status post General anesthesia for Procedure(s):  LAPAROSCOPIC RESECTION OF MASS, TOTAL LAPAROSCOPIC HYSTERECTOMY, BILATERAL SALPINGO-OOPHORECTOMY, APPENDECTOMY, CYSTOSCOPY. Nausea/Vomiting: None    Postoperative hydration reviewed and adequate. Pain:  Pain Scale 1: Numeric (0 - 10) (08/24/20 1805)  Pain Intensity 1: 0 (08/24/20 1805)   Managed    Neurological Status:   Neuro (WDL): Exceptions to WDL (08/24/20 1737)  Neuro  Neurologic State: Sleeping (08/24/20 1737)   At baseline    Mental Status, Level of Consciousness: Alert and  oriented to person, place, and time    Pulmonary Status:   O2 Device: Nasal cannula (08/24/20 1805)   Adequate oxygenation and airway patent    Complications related to anesthesia: None    Post-anesthesia assessment completed.  No concerns    Signed By: Alena Watts MD    August 24, 2020                   INITIAL Post-op Vital signs:   Vitals Value Taken Time   BP 130/87 8/24/2020  6:00 PM   Temp 36 °C (96.8 °F) 8/24/2020  5:37 PM   Pulse 91 8/24/2020  6:15 PM   Resp 13 8/24/2020  6:15 PM   SpO2 100 % 8/24/2020  6:15 PM   Vitals shown include unvalidated device data.

## 2020-08-24 NOTE — ANESTHESIA PREPROCEDURE EVALUATION
Relevant Problems   No relevant active problems       Anesthetic History   No history of anesthetic complications            Review of Systems / Medical History  Patient summary reviewed, nursing notes reviewed and pertinent labs reviewed    Pulmonary  Within defined limits                 Neuro/Psych         Psychiatric history     Cardiovascular  Within defined limits                     GI/Hepatic/Renal     GERD           Endo/Other  Within defined limits           Other Findings              Physical Exam    Airway  Mallampati: II  TM Distance: > 6 cm  Neck ROM: normal range of motion   Mouth opening: Normal     Cardiovascular  Regular rate and rhythm,  S1 and S2 normal,  no murmur, click, rub, or gallop             Dental  No notable dental hx       Pulmonary  Breath sounds clear to auscultation               Abdominal  GI exam deferred       Other Findings            Anesthetic Plan    ASA: 2  Anesthesia type: general          Induction: Intravenous  Anesthetic plan and risks discussed with: Patient

## 2020-08-24 NOTE — PROGRESS NOTES
Bedside and Verbal shift change report given to CARLI Urrutia (oncoming nurse) by Lauren Nieto RN (offgoing nurse). Report included the following information SBAR, Kardex, OR Summary, Procedure Summary, Intake/Output, MAR, Accordion and Recent Results.

## 2020-08-24 NOTE — BRIEF OP NOTE
Brief Postoperative Note    Patient: Sherrill Weber  YOB: 1985  MRN: [id number]    Date of Procedure: 8/24/2020     Pre-Op Diagnosis: PELVIC MASS    Post-Op Diagnosis: Pelvic mass, severe pelvic endometriosis      Procedure(s):  LAPAROSCOPIC RESECTION OF MASS, TOTAL LAPAROSCOPIC HYSTERECTOMY, BILATERAL SALPINGOOOPHORECTOMY, APPENDECTOMY, CYSTOSCOPY    Surgeon(s):  Shyam Enciso MD    Surgical Assistant: Physician Assistant: Therese Farr PA-C    Anesthesia: General     Estimated Blood Loss (mL): 706    Complications: None    Specimens:   ID Type Source Tests Collected by Time Destination   1 : PELVIC MASS BIOPSY Frozen Section Mass  Shyam Enciso MD 8/24/2020 1412 Pathology   2 : UTERUS, CERVIX, BILATERAL FALLOPIAN TUBES AND OVARIES, APPENDIX Frozen Section Uterus  Shyam Enciso MD 8/24/2020 1601 Pathology   1 : PELVIC WASHINGS Fresh Pelvis  Shyam Enciso MD 8/24/2020 1403 Cytology        Implants: * No implants in log *    Drains: * No LDAs found *    Findings: Large right adnexal mass. Extensive pelvic endometriosis. No evidence of malignancy on frozen section pathology.       Electronically Signed by Nate Angel MD on 8/24/2020 at 4:58 PM

## 2020-08-24 NOTE — PROGRESS NOTES
TRANSFER - IN REPORT:    Verbal report received from Aida Styles RN(name) on Erin Meza  being received from PACU(unit) for routine post - op      Report consisted of patients Situation, Background, Assessment and   Recommendations(SBAR). Information from the following report(s) SBAR, Kardex, Intake/Output, MAR and Recent Results was reviewed with the receiving nurse. Opportunity for questions and clarification was provided. Assessment completed upon patients arrival to unit and care assumed.

## 2020-08-24 NOTE — PERIOP NOTES
TRANSFER - OUT REPORT:    Verbal report given to Jose Carlos Pires (name) on Manda Fresh  being transferred to [address] (unit) for routine post - op       Report consisted of patients Situation, Background, Assessment and   Recommendations(SBAR). Time Pre op antibiotic given:1339  Anesthesia Stop time: 0780  Bentley Present on Transfer to floor:yes  Order for Bentley on Chart:yes  Discharge Prescriptions with Chart:no    Information from the following report(s) SBAR, OR Summary, Procedure Summary, Intake/Output, MAR, Recent Results and Cardiac Rhythm NSR was reviewed with the receiving nurse. Opportunity for questions and clarification was provided. Is the patient on 02? YES       L/Min 2       Other nc    Is the patient on a monitor? NO    Is the nurse transporting with the patient? NO    Surgical Waiting Area notified of patient's transfer from PACU? YES      The following personal items collected during your admission accompanied patient upon transfer:   Dental Appliance: Dental Appliances: None  Vision:    Hearing Aid:    Jewelry: Jewelry: None  Clothing: Clothing: With patient(bag of belongings with patient in PACU)  Other Valuables:  Other Valuables: None  Valuables sent to safe:

## 2020-08-25 VITALS
WEIGHT: 141 LBS | BODY MASS INDEX: 21.37 KG/M2 | RESPIRATION RATE: 16 BRPM | HEIGHT: 68 IN | SYSTOLIC BLOOD PRESSURE: 119 MMHG | DIASTOLIC BLOOD PRESSURE: 76 MMHG | HEART RATE: 94 BPM | TEMPERATURE: 98.1 F | OXYGEN SATURATION: 100 %

## 2020-08-25 LAB
ANION GAP SERPL CALC-SCNC: 3 MMOL/L (ref 5–15)
BUN SERPL-MCNC: 10 MG/DL (ref 6–20)
BUN/CREAT SERPL: 11 (ref 12–20)
CALCIUM SERPL-MCNC: 7.8 MG/DL (ref 8.5–10.1)
CHLORIDE SERPL-SCNC: 112 MMOL/L (ref 97–108)
CO2 SERPL-SCNC: 25 MMOL/L (ref 21–32)
CREAT SERPL-MCNC: 0.92 MG/DL (ref 0.55–1.02)
ERYTHROCYTE [DISTWIDTH] IN BLOOD BY AUTOMATED COUNT: 16 % (ref 11.5–14.5)
GLUCOSE SERPL-MCNC: 108 MG/DL (ref 65–100)
HCT VFR BLD AUTO: 23.9 % (ref 35–47)
HCT VFR BLD AUTO: 25.1 % (ref 35–47)
HGB BLD-MCNC: 7.8 G/DL (ref 11.5–16)
HGB BLD-MCNC: 8.3 G/DL (ref 11.5–16)
MCH RBC QN AUTO: 26.2 PG (ref 26–34)
MCHC RBC AUTO-ENTMCNC: 32.6 G/DL (ref 30–36.5)
MCV RBC AUTO: 80.2 FL (ref 80–99)
NRBC # BLD: 0 K/UL (ref 0–0.01)
NRBC BLD-RTO: 0 PER 100 WBC
PLATELET # BLD AUTO: 209 K/UL (ref 150–400)
PMV BLD AUTO: 12.2 FL (ref 8.9–12.9)
POTASSIUM SERPL-SCNC: 4.3 MMOL/L (ref 3.5–5.1)
RBC # BLD AUTO: 2.98 M/UL (ref 3.8–5.2)
SODIUM SERPL-SCNC: 140 MMOL/L (ref 136–145)
WBC # BLD AUTO: 11.1 K/UL (ref 3.6–11)

## 2020-08-25 PROCEDURE — 74011000258 HC RX REV CODE- 258: Performed by: OBSTETRICS & GYNECOLOGY

## 2020-08-25 PROCEDURE — 74011250636 HC RX REV CODE- 250/636: Performed by: OBSTETRICS & GYNECOLOGY

## 2020-08-25 PROCEDURE — 80048 BASIC METABOLIC PNL TOTAL CA: CPT

## 2020-08-25 PROCEDURE — 85027 COMPLETE CBC AUTOMATED: CPT

## 2020-08-25 PROCEDURE — 36415 COLL VENOUS BLD VENIPUNCTURE: CPT

## 2020-08-25 PROCEDURE — 74011250637 HC RX REV CODE- 250/637: Performed by: OBSTETRICS & GYNECOLOGY

## 2020-08-25 PROCEDURE — 99218 HC RM OBSERVATION: CPT

## 2020-08-25 PROCEDURE — 85014 HEMATOCRIT: CPT

## 2020-08-25 RX ORDER — DOCUSATE SODIUM 100 MG/1
100 CAPSULE, LIQUID FILLED ORAL 2 TIMES DAILY
Qty: 60 CAP | Refills: 0 | Status: SHIPPED | OUTPATIENT
Start: 2020-08-25

## 2020-08-25 RX ORDER — OXYCODONE HYDROCHLORIDE 5 MG/1
5 TABLET ORAL
Qty: 15 TAB | Refills: 0 | Status: SHIPPED | OUTPATIENT
Start: 2020-08-25 | End: 2020-08-30

## 2020-08-25 RX ORDER — IBUPROFEN 800 MG/1
800 TABLET ORAL
Qty: 40 TAB | Refills: 0 | Status: SHIPPED | OUTPATIENT
Start: 2020-08-25 | End: 2022-07-12

## 2020-08-25 RX ADMIN — Medication 10 ML: at 02:33

## 2020-08-25 RX ADMIN — CEFAZOLIN SODIUM 1 G: 1 INJECTION, POWDER, FOR SOLUTION INTRAMUSCULAR; INTRAVENOUS at 16:01

## 2020-08-25 RX ADMIN — ACETAMINOPHEN 650 MG: 325 TABLET ORAL at 13:07

## 2020-08-25 RX ADMIN — KETOROLAC TROMETHAMINE 30 MG: 30 INJECTION, SOLUTION INTRAMUSCULAR at 14:24

## 2020-08-25 RX ADMIN — SODIUM CHLORIDE 125 ML/HR: 900 INJECTION, SOLUTION INTRAVENOUS at 02:51

## 2020-08-25 RX ADMIN — Medication 10 ML: at 16:02

## 2020-08-25 RX ADMIN — ACETAMINOPHEN 650 MG: 325 TABLET ORAL at 18:49

## 2020-08-25 RX ADMIN — ACETAMINOPHEN 650 MG: 325 TABLET ORAL at 05:56

## 2020-08-25 RX ADMIN — KETOROLAC TROMETHAMINE 30 MG: 30 INJECTION, SOLUTION INTRAMUSCULAR at 08:29

## 2020-08-25 RX ADMIN — KETOROLAC TROMETHAMINE 30 MG: 30 INJECTION, SOLUTION INTRAMUSCULAR at 02:32

## 2020-08-25 RX ADMIN — CEFAZOLIN SODIUM 1 G: 1 INJECTION, POWDER, FOR SOLUTION INTRAMUSCULAR; INTRAVENOUS at 08:30

## 2020-08-25 RX ADMIN — OXYCODONE HYDROCHLORIDE 5 MG: 5 TABLET ORAL at 03:41

## 2020-08-25 NOTE — PROGRESS NOTES
[facility] GYNECOLOGIC ONCOLOGY  [address] [place] [address], [address] [place] [address]  P [phone]  F [phone]       Patient Name: Christopher Alvarez   Admit Date: 8/24/2020   OR Date: 8/24/2020   Visit Date: 8/25/2020        SUBJECTIVE:    Feeling well this morning. Pain controlled. No nausea.      OBJECTIVE:    Patient Vitals for the past 24 hrs:   Temp Pulse Resp BP SpO2   08/25/20 0440 98.4 °F (36.9 °C) 91 16 122/66 100 %   08/25/20 0145 98.4 °F (36.9 °C) 84 15 131/74 100 %   08/24/20 2240 98.1 °F (36.7 °C) 88 14 128/77 100 %   08/24/20 2140 97.7 °F (36.5 °C) 93 14 131/82 100 %   08/24/20 2030 98.2 °F (36.8 °C) 89 14 141/87 100 %   08/24/20 1930 97.5 °F (36.4 °C) 83 14 136/85 100 %   08/24/20 1920 -- -- -- -- 100 %   08/24/20 1915 -- -- -- -- 100 %   08/24/20 1910 -- -- -- -- 99 %   08/24/20 1905 -- -- -- -- 99 %   08/24/20 1900 -- 91 13 138/70 100 %   08/24/20 1855 -- 89 11 -- 100 %   08/24/20 1850 97.5 °F (36.4 °C) 85 10 -- 100 %   08/24/20 1845 -- 90 11 134/72 100 %   08/24/20 1840 -- 96 14 -- 100 %   08/24/20 1835 -- 96 13 -- 100 %   08/24/20 1830 -- 84 10 137/79 100 %   08/24/20 1825 -- 88 12 -- 100 %   08/24/20 1820 -- 87 10 -- 100 %   08/24/20 1815 -- 91 13 117/73 100 %   08/24/20 1810 -- 87 12 -- 100 %   08/24/20 1805 -- 83 9 -- 100 %   08/24/20 1800 -- 85 13 130/87 100 %   08/24/20 1755 -- 85 13 -- 100 %   08/24/20 1750 -- 85 13 120/83 100 %   08/24/20 1745 -- 86 13 122/81 100 %   08/24/20 1740 -- 87 14 123/78 100 %   08/24/20 1737 96.8 °F (36 °C) 87 12 117/69 100 %   08/24/20 1057 98.4 °F (36.9 °C) -- -- -- --   08/24/20 1047 -- 82 16 117/79 100 %       Date 08/24/20 0700 - 08/25/20 0659 08/25/20 0700 - 08/26/20 0659   Shift 6597-4783 1821-3585 24 Hour Total 2841-3887 9950-3412 24 Hour Total   INTAKE   I.V.(mL/kg/hr) 2400(3.1)  2400(1.6)        Volume (lactated Ringers infusion) 1400  1400        Volume (0.9% sodium chloride infusion) 1000  1000      Shift Total(mL/kg) 0895(78.0)  2400(37.5) OUTPUT   Urine(mL/kg/hr) 150(0.2) 925(1.2) 1075(0.7)        Urine Output 150  150        Urine Output (mL) ([REMOVED] Urinary Catheter 08/24/20)  925 925      Blood 500  500        Estimated Blood Loss 500  500      Shift Total(mL/kg) 650(10.2) 925(14.5) 1575(24.6)      NET 1750 -925 825      Weight (kg) 64 64 64 64 64 64       Physical Exam     General:  alert, cooperative, no distress     Cardiac:  Regular rate and rhythm        Lungs:  clear to auscultation bilaterally  Abdomen:  soft, nondistended; expected tenderness       Wound:  clean, dry   Extremity: extremities normal, atraumatic, no cyanosis or edema    Data Review  Lab Results   Component Value Date/Time    WBC 11.1 (H) 08/25/2020 06:02 AM    ABS. NEUTROPHILS 3.4 08/17/2020 02:39 PM    HGB 7.8 (L) 08/25/2020 06:02 AM    HCT 23.9 (L) 08/25/2020 06:02 AM    MCV 80.2 08/25/2020 06:02 AM    MCH 26.2 08/25/2020 06:02 AM    PLATELET 156 91/31/4911 06:02 AM     Lab Results   Component Value Date/Time    Sodium 140 08/25/2020 06:02 AM    Potassium 4.3 08/25/2020 06:02 AM    Chloride 112 (H) 08/25/2020 06:02 AM    CO2 25 08/25/2020 06:02 AM    Glucose 108 (H) 08/25/2020 06:02 AM    BUN 10 08/25/2020 06:02 AM    Creatinine 0.92 08/25/2020 06:02 AM    Calcium 7.8 (L) 08/25/2020 06:02 AM    Albumin 3.9 08/17/2020 02:39 PM    Bilirubin, total 0.6 08/17/2020 02:39 PM    ALT (SGPT) 10 (L) 08/17/2020 02:39 PM    Alk. phosphatase 78 08/17/2020 02:39 PM     IMPRESSION/PLAN:    1 Day Post-Op Procedure(s):  LAPAROSCOPIC RESECTION OF MASS, TOTAL LAPAROSCOPIC HYSTERECTOMY, BILATERAL SALPINGO-OOPHORECTOMY, APPENDECTOMY, CYSTOSCOPY for PELVIC MASS    Oncologic:  27 y.o. female with a working diagnosis of a complex pelvic mass and elevated CA-125. Extensive pelvic endometriosis found at surgery. No evidence of malignancy on frozen section. Heme/CV:  VSS. Hgb 7.8 this morning. Will recheck Hgb at midday. If stable, may be able to go home this afternoon.     Renal:  adequate urine output overnight. Bentley out this morning. Waiting to void. Creatinine 0.92 this morning       FEN/GI:  no nausea. Regular diet today. ID/Wound:  afebrile. Incisions benign. PPX:  SCDs. IS. Continue OOB. Dispostion:  Doing well postoperatively. If progresses well and Hgb stable, home this afternoon.          Denise Navarrete PA-C

## 2020-08-25 NOTE — PROGRESS NOTES
Repeat H/H 8.3/25.1  Patient is feeling well. Has been out of bed walking. Has been able to void. Tolerating diet. Pain controlled with oral pain medication. Stable for discharge. Discharge instructions reviewed. Reiterated nothing in the vagina for 6 weeks. Can shower when she gets home. No swimming. No lifting about 15-20 pounds. Follow up in 4 weeks.    Ahmet Vargas

## 2020-08-25 NOTE — OP NOTES
Gynecologic Oncology Operative Report    Jordana Lopez    8/24/20      Pre-operative dx:  Pelvic mass    Post-operative dx:  Pelvic mass, severe pelvic endometriosis    Procedure:  Laparoscopic resection of mass (5-10 cm), total laparoscopic hysterectomy, bilateral salpingooophorectomy, appendectomy, cystoscopy    Surgeon:  Quiana Joseph MD    Assistant:  Shannan Bloom PA-C     Anesthesia:  GETA    EBL:  596 cc    Complications:  None    Implants:  None    Specimens:   ID Type Source Tests Collected by Time Destination   1 : PELVIC MASS BIOPSY Frozen Section Mass   Lisa Andrew MD 8/24/2020 1412 Pathology   2 : UTERUS, CERVIX, BILATERAL FALLOPIAN TUBES AND OVARIES, APPENDIX Frozen Section Uterus   Lisa Andrew MD 8/24/2020 1601 Pathology   1 : PELVIC WASHINGS Fresh Pelvis   Lisa Andrew MD 8/24/2020 1403 Cytology     Operative indications:  26 yo BF with bilateral complex pelvic masses that have been present for over a year and have remained essentially stable over that time. On exam she has cul de sac nodularity and tenderness. My clinical suspicion was pelvic endometriosis, but a malignancy could not be excluded. I recommended a laparoscopic evaluation with resection of the masses, along with hysterectomy and bilateral salpingooophorectomy. She was counseled that a laparotomy might be necessary. Operative findings:  Large right adnexal mass. Extensive pelvic endometriosis. No evidence of malignancy on frozen section pathology. Procedure in detail:  After the risks, benefits, indications, and alternatives of the procedure were discussed with the patient and informed consent was obtained, the patient was taken to the operating room. She was positively identified, administered general anesthesia, and then placed in the dorsal lithotomy position in [address] [place] [address]. An exam under anesthesia was performed. She was then prepped and draped in the usual fashion.   A apodaca catheter was inserted, followed by a V-Care uterine manipulator. We then proceeded with laparoscopy by grasping the umbilicus on either side with Allis clamps. A small incision was made at the base with an #11-blade scalpel. A 5 mm blade-less trocar was then directly inserted, with the camera in position to visualize safe entry. Once proper placement was confirmed, the abdomen was then insufflated with carbon dioxide. A 5 mm blade-less trocar was then inserted in each lower quadrant, midway between the anterior superior iliac spine and the umbilicus. These trocars were inserted under direct visualization to ensure safe entry. The abdomen and pelvis were then examined, with the above mentioned findings noted. Pelvic washings were obtained as well. The patient was then placed in Trendelenburg tilt and we then proceeded with the dissection. The left round ligament was identified, then coagulated and transected with the Harmonic Scalpel, allowing entry into the retroperitoneal space. The vesico-uterine peritoneum was divided with the Harmonic Scalpel from the left side across the midline, allowing visualization of the ring of the V-Care manipulator anteriorly. We then identified the left ureter and bluntly developed the perirectal space. The left uterine artery was identified at its origin off of the hypogastric artery, and it was coagulated and transected with the Harmonic Scalpel at that point, with the ureter being held on traction medially to ensure its safety. The left ovarian vessels were then isolated and then coagulated and transected with the Harmonic Scalpel. The posterior leaf of the broad ligament was then divided with the Harmonic Scalpel up to the level of the uterine body. We then directed our attention to the right side of the pelvis. The right ovarian mass was densely adherent to the posterior uterus and the right pelvic sidewall.   The right round ligament was identified and then coagulated and transected with the Harmonic Scalpel, allowing entry into the retroperitoneal space. The remaining vesico-uterine peritoneum was then divided with the Harmonic Scalpel on the right side. The right ureter was then identified and the perirectal space was bluntly developed. There ureter was markedly dilated all the way down to the level of the uterine artery. The right uterine artery was then identified at its origin off of the hypogastric artery. It was coagulated and transected with the Harmonic Scalpel at that point, with the ureter being held on traction medially to ensure its safety. The appendix was densely adherent to the right adnexal mass. We elected to proceed with appendectomy due to likely involvement with whatever process this turned out to be. The meso appendix was divided with the Harmonic and the base of the appendix was skeletonized. The appendix was then divided with a 45 mm Ethicon [place] Flex stapler. To accommodate the stapling device the LLQ 5 mm trocar was converted to 12 mm trocar. The right ovarian vessels were then isolated and then coagulated and transected with the Harmonic Scalpel. The posterior leaf of the broad ligament was then divided with the Harmonic Scalpel up to the level of the uterine body. I then tried to separate the mass from the uterus, as I could not visualize posteriorly to perform a colpotomy. While attempting to do this the mass was ruptured, draining \"chocolate cyst\" fluid. This allowed me to mobilize it off the posterior uterus and sidewall. We then moved anteriorly and the bladder was sharply dissected off of the lower uterine segment and cervix until it was well below the ring of the [employer]. The uterine arteries were then skeletonized bilaterally and then coagulated and transected with the Harmonic Scalpel at the level of the V-Care ring.   A circumferential colpotomy was then performed by using the active blade of the Harmonic Scalpel to cut down onto the V-Care ring. Once the colpotomy was completed, the specimen was delivered transvaginally and sent to pathology. A bulb syringe was then placed into the vagina to maintain pneumoperitoneum for vaginal cuff closure. The cuff was then reapproximated with three figure-of-eight stitches of 2-0 PDS suture using the LSI RD-180 suturing device. The sutures were secured in place with the LSI Ti-Knot device. Good reapproximation and hemostasis was noted. A cystoscopy was then performed. The apodaca was removed and a 70 degree cystoscopy was inserted. The patient was administered IV methylene blue by anesthesia. Flow of blue dye was readily seen coming from the left ureter. I could see vermiculation and flow of urine coming from the right ureteral orifice, but it was not blue. The scope was removed and the apodaca was replaced. The pelvis was then irrigated and all pedicles inspected. The 12 mm trocar was removed and the site was closed at the level of the fascia with a 0 Vicryl suture using the [employer] closure device. Once satisfied with hemostasis, the gas was then allowed to escape from the abdomen and the trocars were removed. She was then taken out of Trendelenburg tilt. The incision sites were closed with a stitch of 4-0 Monocryl, followed by a layer of Dermabond. The bulb syringe was then removed from the vagina. The patient was taken out of stirrups, awakened from anesthesia, and taken to the recovery room in stable condition. All instrument, sponge, and needle counts were correct.         Pati Moran MD  8/25/2020  10:17 AM

## 2020-08-25 NOTE — PROGRESS NOTES
Bedside and Verbal shift change report given to Bianca Rajput. (oncoming nurse) by Jamin Kenyon (offgoing nurse). Report included the following information SBAR, Kardex, OR Summary, Procedure Summary, MAR and Recent Results.

## 2020-08-25 NOTE — PROGRESS NOTES
Bedside and Verbal shift change report given to [address] (oncoming nurse) by Luca Garcia (offgoing nurse). Report included the following information SBAR, Kardex, OR Summary, Procedure Summary, Intake/Output and MAR.

## 2020-08-25 NOTE — DISCHARGE INSTRUCTIONS
[facility] [address]  P [phone]  F [phone]     Juani Henderson      Dear Ms. Lam Renzoharleen,      Please review your instructions with your nurse and ask any questions so you have all the information you need to recover well at home. If you do not feel you have everything you need to succeed and be safe after you leave the hospital, please discuss these concerns with your nurse. As always, call for any questions at home. Your doctor: Dr. Francisco Ni  Diagnosis: Endometriosis [N80.9]  Procedure: Procedure(s):  LAPAROSCOPIC RESECTION OF MASS, TOTAL LAPAROSCOPIC HYSTERECTOMY, BILATERAL SALPINGO-OOPHORECTOMY, APPENDECTOMY, CYSTOSCOPY  Date of Discharge: 8/25/2020      Take Home Medications     See Discharge Medication Review provided to you by your nurse. If you did not receive one, request this prior to your discharge. · It is important that you take your medications as they are prescribed. · Keep your medications in the bottles provided by the pharmacist and keep a list of the medication names, dosages, times to be taken and what they are for in your wallet. · Do not take other medications without consulting your doctor. · If you are prescribed enoxaparin (Lovenox) and are taking a baby aspirin daily, please hold this medication until your course of enoxaparin is completed. · If you no longer need your prescribed pain medication, you may take Tylenol or Aleve alone. · You should take a daily gentle stool softener such as a colace pill or dulcolax suppository for constipation as this is not uncommon after surgery and/or while on pain medication. If not prescribed, this can be found over the counter. If constipation persists for >24 hours you should take a dose of Milk of Magnesia. Call if your constipation continues. Diet    · Stay hydrated and drink fluids such as gatorade and water.  This will also help prevent constipation and dehydration. Limit somewhat any usual caffeine intake of beverages such as soda, tea and coffee as this may serve to dehydrate you. · For the first 2-3 days keep a low fiber diet avoiding raw vegetables or fruits with skin. A diet consisting of soup, cereal, yogurt, eggs, fish, Boost/Ensure. Avoid fatty/greasy foods. · If nauseated, keep your diet limited to liquids and call if this persists. Activity    · If possible, have someone with you at all times until you feel stable. · Gradually increase your activity each day. There are generally no restrictions on walking, climbing stairs or riding in a car. Try to walk at least 4 times per day. · Showers are okay. If you have an incision, no tub bathing/swimming for two weeks. · No driving for 2 week and/or while on pain medication. · There are no lifting restrictions if you did not have surgery. · If you had surgery, the following restrictions are in place:  1. If you had a laparoscopic procedure, no lifting greater than 25 lbs for 3 weeks. 2. If you had an open procedure, no heavy lifting greater than 15 lbs for 8 weeks. 3. If you had a hysterectomy, nothing per vagina for 6-8 weeks. 4. If you had any type of vaginal procedure, you may experience vaginal spotting. Should the bleeding require more that 4 pads a day please call our office. Incision    · You should expect some discomfort in the area of your incision, particularly as you increase your activity. If you notice an area of increasing redness or new drainage, please call your doctor. · Staples are generally removed in 10-14 days. · Many incisions will have buried absorbable sutures, which do not need to be removed and are covered by protective glue. This will come off over time. Causes For Concern    If any of the following occur, please call our office and speak with the Nurse/aid who will help you with your problem or ask the doctor to call you.      Problems with the incision, including increasing pain, swelling, redness or drainage.  Inability to pass urine    Increasing abdominal pain despite medication   Persisting nausea or vomiting.  Fever or chills and a temperature >101F   Constipation (no bowel movement for three days).  Diarrhea (more than three watery stools within 24 hours).  Excessive vaginal or wound bleeding.  If after hours and you cannot reach an on-call physician, call 911. Follow-Up    Call [phone] to schedule an appointment with Dr. Deric Powell in 4 weeks. Information obtained by :  I understand that if any problems occur once I am at home I am to contact my physician. I understand and acknowledge receipt of the instructions indicated above.                                                                                                                                            Physician's or R.N.'s Signature                                                                  Date/Time                                                                                                                                              Patient or Representative Signature                                                          Date/Time

## 2020-08-25 NOTE — PROGRESS NOTES
T.O.C.:   Pt expected to d/c to home   Family to provide transport at d/c   Emergency contact: Celine Se [phone]    CM met with pt at bedside; verified demographics, insurance, PCP and emergency contact. Pt expected to d/c to home with family to provide transport. Prior to admission pt ambulates independently; has adequate support. Pt does not have AMD, does not want to complete today. CM explained decision maker as next of kin, pt indicated she understood. Pt stated she will complete AMD at home. [facility] information given to pt for future needs. No other issues at present. CM will continue to follow. Sharon Levine RN    Care Management Interventions  PCP Verified by CM: Yes(not sure)  Palliative Care Criteria Met (RRAT>21 & CHF Dx)?: No  MyChart Signup: No  Discharge Durable Medical Equipment: No  Physical Therapy Consult: No  Occupational Therapy Consult: No  Speech Therapy Consult: No  Current Support Network: Own Home(pt's 4 children live with her)  Confirm Follow Up Transport: Family  The Plan for Transition of Care is Related to the Following Treatment Goals : medically stable  Discharge Location  Discharge Placement: Home with family assistance     Sharon Levine RN      Observation notice provided in writing to patient and/or caregiver as well as verbal explanation of the policy. Patients who are in outpatient status also receive the Observation notice.       Sharon Levine RN

## 2020-09-02 ENCOUNTER — APPOINTMENT (OUTPATIENT)
Dept: VASCULAR SURGERY | Age: 35
End: 2020-09-02
Attending: EMERGENCY MEDICINE
Payer: MEDICAID

## 2020-09-02 ENCOUNTER — HOSPITAL ENCOUNTER (OUTPATIENT)
Age: 35
Setting detail: OBSERVATION
Discharge: HOME | End: 2020-09-05
Attending: EMERGENCY MEDICINE | Admitting: OBSTETRICS & GYNECOLOGY
Payer: MEDICAID

## 2020-09-02 ENCOUNTER — APPOINTMENT (OUTPATIENT)
Dept: CT IMAGING | Age: 35
End: 2020-09-02
Attending: EMERGENCY MEDICINE
Payer: MEDICAID

## 2020-09-02 DIAGNOSIS — N80.9 ENDOMETRIOSIS: ICD-10-CM

## 2020-09-02 DIAGNOSIS — R18.8 PELVIC FLUID COLLECTION: ICD-10-CM

## 2020-09-02 DIAGNOSIS — T81.43XA INFECTION OF ORGAN OR ORGAN SPACE AFTER SURGERY, INITIAL ENCOUNTER: Primary | ICD-10-CM

## 2020-09-02 DIAGNOSIS — L02.91 ABSCESS: ICD-10-CM

## 2020-09-02 LAB
ALBUMIN SERPL-MCNC: 3.2 G/DL (ref 3.5–5)
ALBUMIN/GLOB SERPL: 0.6 {RATIO} (ref 1.1–2.2)
ALP SERPL-CCNC: 78 U/L (ref 45–117)
ALT SERPL-CCNC: 11 U/L (ref 12–78)
ANION GAP SERPL CALC-SCNC: 9 MMOL/L (ref 5–15)
AST SERPL-CCNC: 9 U/L (ref 15–37)
BASOPHILS # BLD: 0 K/UL (ref 0–0.1)
BASOPHILS NFR BLD: 0 % (ref 0–1)
BILIRUB SERPL-MCNC: 0.7 MG/DL (ref 0.2–1)
BUN SERPL-MCNC: 8 MG/DL (ref 6–20)
BUN/CREAT SERPL: 8 (ref 12–20)
CALCIUM SERPL-MCNC: 9.4 MG/DL (ref 8.5–10.1)
CHLORIDE SERPL-SCNC: 99 MMOL/L (ref 97–108)
CO2 SERPL-SCNC: 26 MMOL/L (ref 21–32)
COMMENT, HOLDF: NORMAL
CREAT SERPL-MCNC: 1 MG/DL (ref 0.55–1.02)
DIFFERENTIAL METHOD BLD: ABNORMAL
EOSINOPHIL # BLD: 0 K/UL (ref 0–0.4)
EOSINOPHIL NFR BLD: 0 % (ref 0–7)
ERYTHROCYTE [DISTWIDTH] IN BLOOD BY AUTOMATED COUNT: 15.4 % (ref 11.5–14.5)
GLOBULIN SER CALC-MCNC: 5.1 G/DL (ref 2–4)
GLUCOSE SERPL-MCNC: 101 MG/DL (ref 65–100)
HCT VFR BLD AUTO: 26.4 % (ref 35–47)
HGB BLD-MCNC: 8.8 G/DL (ref 11.5–16)
IMM GRANULOCYTES # BLD AUTO: 0.1 K/UL (ref 0–0.04)
IMM GRANULOCYTES NFR BLD AUTO: 1 % (ref 0–0.5)
LACTATE SERPL-SCNC: 0.9 MMOL/L (ref 0.4–2)
LYMPHOCYTES # BLD: 0.8 K/UL (ref 0.8–3.5)
LYMPHOCYTES NFR BLD: 7 % (ref 12–49)
MCH RBC QN AUTO: 26.3 PG (ref 26–34)
MCHC RBC AUTO-ENTMCNC: 33.3 G/DL (ref 30–36.5)
MCV RBC AUTO: 78.8 FL (ref 80–99)
MONOCYTES # BLD: 0.8 K/UL (ref 0–1)
MONOCYTES NFR BLD: 7 % (ref 5–13)
NEUTS SEG # BLD: 9.6 K/UL (ref 1.8–8)
NEUTS SEG NFR BLD: 85 % (ref 32–75)
NRBC # BLD: 0 K/UL (ref 0–0.01)
NRBC BLD-RTO: 0 PER 100 WBC
PLATELET # BLD AUTO: 423 K/UL (ref 150–400)
PMV BLD AUTO: 11.2 FL (ref 8.9–12.9)
POTASSIUM SERPL-SCNC: 3.4 MMOL/L (ref 3.5–5.1)
PROT SERPL-MCNC: 8.3 G/DL (ref 6.4–8.2)
RBC # BLD AUTO: 3.35 M/UL (ref 3.8–5.2)
SAMPLES BEING HELD,HOLD: NORMAL
SODIUM SERPL-SCNC: 134 MMOL/L (ref 136–145)
WBC # BLD AUTO: 11.4 K/UL (ref 3.6–11)

## 2020-09-02 PROCEDURE — 85025 COMPLETE CBC W/AUTO DIFF WBC: CPT

## 2020-09-02 PROCEDURE — 99285 EMERGENCY DEPT VISIT HI MDM: CPT

## 2020-09-02 PROCEDURE — 87077 CULTURE AEROBIC IDENTIFY: CPT

## 2020-09-02 PROCEDURE — 87040 BLOOD CULTURE FOR BACTERIA: CPT

## 2020-09-02 PROCEDURE — 74011250637 HC RX REV CODE- 250/637: Performed by: OBSTETRICS & GYNECOLOGY

## 2020-09-02 PROCEDURE — 74177 CT ABD & PELVIS W/CONTRAST: CPT

## 2020-09-02 PROCEDURE — 80053 COMPREHEN METABOLIC PANEL: CPT

## 2020-09-02 PROCEDURE — 74011250636 HC RX REV CODE- 250/636: Performed by: OBSTETRICS & GYNECOLOGY

## 2020-09-02 PROCEDURE — 74011000258 HC RX REV CODE- 258: Performed by: EMERGENCY MEDICINE

## 2020-09-02 PROCEDURE — 36415 COLL VENOUS BLD VENIPUNCTURE: CPT

## 2020-09-02 PROCEDURE — 83605 ASSAY OF LACTIC ACID: CPT

## 2020-09-02 PROCEDURE — 74011250636 HC RX REV CODE- 250/636: Performed by: EMERGENCY MEDICINE

## 2020-09-02 PROCEDURE — 93971 EXTREMITY STUDY: CPT

## 2020-09-02 PROCEDURE — 74011000258 HC RX REV CODE- 258: Performed by: RADIOLOGY

## 2020-09-02 PROCEDURE — 96365 THER/PROPH/DIAG IV INF INIT: CPT

## 2020-09-02 PROCEDURE — 99218 HC RM OBSERVATION: CPT

## 2020-09-02 PROCEDURE — 74011000636 HC RX REV CODE- 636: Performed by: RADIOLOGY

## 2020-09-02 PROCEDURE — 96375 TX/PRO/DX INJ NEW DRUG ADDON: CPT

## 2020-09-02 RX ORDER — SODIUM CHLORIDE 9 MG/ML
100 INJECTION, SOLUTION INTRAVENOUS CONTINUOUS
Status: DISCONTINUED | OUTPATIENT
Start: 2020-09-02 | End: 2020-09-05 | Stop reason: HOSPADM

## 2020-09-02 RX ORDER — IBUPROFEN 600 MG/1
600 TABLET ORAL
Status: DISCONTINUED | OUTPATIENT
Start: 2020-09-02 | End: 2020-09-03

## 2020-09-02 RX ORDER — SODIUM CHLORIDE 0.9 % (FLUSH) 0.9 %
10 SYRINGE (ML) INJECTION
Status: COMPLETED | OUTPATIENT
Start: 2020-09-02 | End: 2020-09-02

## 2020-09-02 RX ORDER — ACETAMINOPHEN 500 MG
500 TABLET ORAL
Status: DISCONTINUED | OUTPATIENT
Start: 2020-09-02 | End: 2020-09-03

## 2020-09-02 RX ORDER — MORPHINE SULFATE 2 MG/ML
2 INJECTION, SOLUTION INTRAMUSCULAR; INTRAVENOUS
Status: COMPLETED | OUTPATIENT
Start: 2020-09-02 | End: 2020-09-02

## 2020-09-02 RX ORDER — OXYCODONE HYDROCHLORIDE 5 MG/1
5 TABLET ORAL
Status: DISCONTINUED | OUTPATIENT
Start: 2020-09-02 | End: 2020-09-03

## 2020-09-02 RX ORDER — ONDANSETRON 2 MG/ML
4 INJECTION INTRAMUSCULAR; INTRAVENOUS
Status: COMPLETED | OUTPATIENT
Start: 2020-09-02 | End: 2020-09-02

## 2020-09-02 RX ADMIN — SODIUM CHLORIDE 1000 ML: 9 INJECTION, SOLUTION INTRAVENOUS at 16:38

## 2020-09-02 RX ADMIN — IOPAMIDOL 100 ML: 755 INJECTION, SOLUTION INTRAVENOUS at 17:15

## 2020-09-02 RX ADMIN — Medication 10 ML: at 17:15

## 2020-09-02 RX ADMIN — ONDANSETRON 4 MG: 2 INJECTION INTRAMUSCULAR; INTRAVENOUS at 16:38

## 2020-09-02 RX ADMIN — SODIUM CHLORIDE 100 ML: 900 INJECTION, SOLUTION INTRAVENOUS at 17:15

## 2020-09-02 RX ADMIN — ACETAMINOPHEN 500 MG: 500 TABLET ORAL at 19:58

## 2020-09-02 RX ADMIN — MORPHINE SULFATE 2 MG: 2 INJECTION, SOLUTION INTRAMUSCULAR; INTRAVENOUS at 16:38

## 2020-09-02 RX ADMIN — OXYCODONE HYDROCHLORIDE 5 MG: 5 TABLET ORAL at 23:24

## 2020-09-02 RX ADMIN — PIPERACILLIN AND TAZOBACTAM 3.38 G: 3; .375 INJECTION, POWDER, LYOPHILIZED, FOR SOLUTION INTRAVENOUS at 18:57

## 2020-09-02 RX ADMIN — SODIUM CHLORIDE 100 ML/HR: 900 INJECTION, SOLUTION INTRAVENOUS at 20:04

## 2020-09-02 NOTE — ED PROVIDER NOTES
This is a 26-year-old female who on the 24th had Patient had a laparoscopic resection of several masses in the pelvis which had been there for over a year. She had a total laparoscopic hysterectomy, bilateral oophorectomy and salpingectomy as well as an appendectomy. Since that time she had done well until approximately 2 days ago when she started developing low-grade temperatures about 100. Today the temperature was 103 and she has been having pain in the abdomen getting more severe. She has had no drainage from the wound site. She has had some nausea but no vomiting. There is been no shortness of breath or chest pain. She has not moved her bowels since the surgery and is having no difficulty with her urine. She does have some pain extending down into the left thigh. She has had some chills. Patient denies any other acute symptomatology. She has not contacted Dr. Deric Powell to let them know that she is having this postoperative problem.            Past Medical History:   Diagnosis Date    Abscess 12/1/2016    Anemia NEC     Bipolar 1 disorder ([address]     Breast tenderness     bilateral, since 11/2016, on and off     Cancer [facility]) 2008    CERVICAL    Depression     Dizziness     Gastrointestinal disorder     pancreatitis    GERD (gastroesophageal reflux disease)     Increased frequency of headaches     Kidney stones 10/2012    Nausea     Nipple discharge 2009    white/clear discharge since birth of child     Other ill-defined conditions(799.89)     sickle cell trait    Other ill-defined conditions(799.89)     anemia    Psychiatric disorder     depression, bi-polar    Psychotic disorder ([address]     bipolar 1    Sickle cell trait ([address]     Sickle cell trait ([address]     Stomach pain        Past Surgical History:   Procedure Laterality Date    HX GYN      tubaligation    HX GYN      ectopic pregnancy surgery    HX OTHER SURGICAL      hx of blood transfusions         Family History:   Problem Relation Age of Onset    Heart Disease Father     Anesth Problems Neg Hx        Social History     Socioeconomic History    Marital status: SINGLE     Spouse name: Not on file    Number of children: Not on file    Years of education: Not on file    Highest education level: Not on file   Occupational History    Not on file   Social Needs    Financial resource strain: Not on file    Food insecurity     Worry: Not on file     Inability: Not on file    Transportation needs     Medical: Not on file     Non-medical: Not on file   Tobacco Use    Smoking status: Current Every Day Smoker     Packs/day: 0.25     Years: 9.00     Pack years: 2.25    Smokeless tobacco: Never Used   Substance and Sexual Activity    Alcohol use: No    Drug use: Not Currently     Comment: occasional    Sexual activity: Yes     Partners: Male     Birth control/protection: Surgical     Comment: Tubal   Lifestyle    Physical activity     Days per week: Not on file     Minutes per session: Not on file    Stress: Not on file   Relationships    Social connections     Talks on phone: Not on file     Gets together: Not on file     Attends Muslim service: Not on file     Active member of club or organization: Not on file     Attends meetings of clubs or organizations: Not on file     Relationship status: Not on file    Intimate partner violence     Fear of current or ex partner: Not on file     Emotionally abused: Not on file     Physically abused: Not on file     Forced sexual activity: Not on file   Other Topics Concern    Not on file   Social History Narrative    Not on file         ALLERGIES: Latex; Cetirizine; Tramadol; and Naproxen    Review of Systems   Constitutional: Positive for chills, fatigue and fever. Negative for activity change and appetite change. HENT: Negative for ear pain, facial swelling, sore throat and trouble swallowing. Eyes: Negative for pain, discharge and visual disturbance.    Respiratory: Negative for chest tightness, shortness of breath and wheezing. Cardiovascular: Negative for chest pain and palpitations. Gastrointestinal: Positive for abdominal pain, constipation and nausea. Negative for blood in stool and vomiting. Genitourinary: Negative for difficulty urinating, flank pain and hematuria. Musculoskeletal: Negative for arthralgias, joint swelling, myalgias and neck pain. Skin: Negative for color change and rash. Neurological: Negative for dizziness, weakness, numbness and headaches. Hematological: Negative for adenopathy. Does not bruise/bleed easily. Psychiatric/Behavioral: Negative for behavioral problems, confusion and sleep disturbance. All other systems reviewed and are negative. Vitals:    09/02/20 1510   BP: 111/70   Pulse: (!) 125   Resp: 16   Temp: 98.2 °F (36.8 °C)   SpO2: 100%   Weight: 64 kg (141 lb)   Height: 5' 7.5\" (1.715 m)            Physical Exam  Vitals signs and nursing note reviewed. Constitutional:       General: She is not in acute distress. Appearance: She is well-developed. HENT:      Head: Normocephalic and atraumatic. Nose: Nose normal.   Eyes:      General: No scleral icterus. Conjunctiva/sclera: Conjunctivae normal.      Pupils: Pupils are equal, round, and reactive to light. Neck:      Musculoskeletal: Normal range of motion and neck supple. Thyroid: No thyromegaly. Vascular: No JVD. Trachea: No tracheal deviation. Comments: No carotid bruits noted. Cardiovascular:      Rate and Rhythm: Normal rate and regular rhythm. Heart sounds: Normal heart sounds. No murmur. No friction rub. No gallop. Pulmonary:      Effort: Pulmonary effort is normal. No respiratory distress. Breath sounds: Normal breath sounds. No wheezing or rales. Chest:      Chest wall: No tenderness. Abdominal:      General: Bowel sounds are normal. There is no distension. Palpations: Abdomen is soft. There is no mass.       Tenderness: There is abdominal tenderness (Marked generalized tenderness to palpation with guarding. Wounds look good). There is no guarding or rebound. Comments: No bowel sounds     Musculoskeletal: Normal range of motion. General: No tenderness. Comments: There is tenderness over the medial aspect of the left thigh. Lymphadenopathy:      Cervical: No cervical adenopathy. Skin:     General: Skin is warm and dry. Findings: No erythema or rash. Neurological:      Mental Status: She is alert and oriented to person, place, and time. Cranial Nerves: No cranial nerve deficit. Coordination: Coordination normal.      Deep Tendon Reflexes: Reflexes are normal and symmetric. Psychiatric:         Behavior: Behavior normal.         Thought Content: Thought content normal.         Judgment: Judgment normal.          MDM  Number of Diagnoses or Management Options     Amount and/or Complexity of Data Reviewed  Clinical lab tests: ordered and reviewed  Tests in the radiology section of CPT®: ordered and reviewed  Decide to obtain previous medical records or to obtain history from someone other than the patient: yes  Review and summarize past medical records: yes  Discuss the patient with other providers: yes  Independent visualization of images, tracings, or specimens: yes    Risk of Complications, Morbidity, and/or Mortality  Presenting problems: high  Diagnostic procedures: high  Management options: high    Patient Progress  Patient progress: stable         Procedures    Given the degree of fever reported earlier today, the significance of her abdominal pain and the nausea, will obtain labs, lactate and cultures as well as obtaining a CT of the abdomen and pelvis. A consult is placed with Dr. Dang Gutierrez. She is given IV fluids and medications for pain and nausea. 4:24 PM  I have spoken with Dr. Dang Gutierrez who knows the patient well. He said that she had had extensive endometriosis in the pelvis.   She also had some hydro-on the right side which he anticipated resolving to some degree after removal of the mass. We will consult his partner upon completion of studies. 6:29 PM  Patient is noted to have multiple ill-defined abscesses in the pelvis in the surgical bed. She also continues to have severe right hydronephrosis due to stricture of some type. Consult has been placed with GYN oncology. Antibiotics have been ordered and the patient will require admission.

## 2020-09-02 NOTE — ED TRIAGE NOTES
Patient arrives post opt hysterectomy 8/24 for fevers on and off. Patient states that this morning her fever got the highest 103. Patient took ibuprofen around 0415 with relief. Patient states \"I just dont feel good. \" Ena Russell MD did surgery. Denies any changes in bleeding since surgery. Denies cough, n/v/d, chest pain.

## 2020-09-03 LAB
BASOPHILS # BLD: 0 K/UL (ref 0–0.1)
BASOPHILS NFR BLD: 0 % (ref 0–1)
DIFFERENTIAL METHOD BLD: ABNORMAL
EOSINOPHIL # BLD: 0 K/UL (ref 0–0.4)
EOSINOPHIL NFR BLD: 0 % (ref 0–7)
ERYTHROCYTE [DISTWIDTH] IN BLOOD BY AUTOMATED COUNT: 15.4 % (ref 11.5–14.5)
HCT VFR BLD AUTO: 22.5 % (ref 35–47)
HGB BLD-MCNC: 7.6 G/DL (ref 11.5–16)
IMM GRANULOCYTES # BLD AUTO: 0.1 K/UL (ref 0–0.04)
IMM GRANULOCYTES NFR BLD AUTO: 1 % (ref 0–0.5)
LYMPHOCYTES # BLD: 1 K/UL (ref 0.8–3.5)
LYMPHOCYTES NFR BLD: 10 % (ref 12–49)
MCH RBC QN AUTO: 26.2 PG (ref 26–34)
MCHC RBC AUTO-ENTMCNC: 33.8 G/DL (ref 30–36.5)
MCV RBC AUTO: 77.6 FL (ref 80–99)
MONOCYTES # BLD: 0.9 K/UL (ref 0–1)
MONOCYTES NFR BLD: 10 % (ref 5–13)
NEUTS SEG # BLD: 7.7 K/UL (ref 1.8–8)
NEUTS SEG NFR BLD: 79 % (ref 32–75)
NRBC # BLD: 0 K/UL (ref 0–0.01)
NRBC BLD-RTO: 0 PER 100 WBC
PLATELET # BLD AUTO: 352 K/UL (ref 150–400)
PMV BLD AUTO: 11.3 FL (ref 8.9–12.9)
RBC # BLD AUTO: 2.9 M/UL (ref 3.8–5.2)
WBC # BLD AUTO: 9.6 K/UL (ref 3.6–11)

## 2020-09-03 PROCEDURE — 96375 TX/PRO/DX INJ NEW DRUG ADDON: CPT

## 2020-09-03 PROCEDURE — 74011000250 HC RX REV CODE- 250: Performed by: OBSTETRICS & GYNECOLOGY

## 2020-09-03 PROCEDURE — 74011250636 HC RX REV CODE- 250/636: Performed by: OBSTETRICS & GYNECOLOGY

## 2020-09-03 PROCEDURE — 74011250637 HC RX REV CODE- 250/637: Performed by: OBSTETRICS & GYNECOLOGY

## 2020-09-03 PROCEDURE — C9113 INJ PANTOPRAZOLE SODIUM, VIA: HCPCS | Performed by: OBSTETRICS & GYNECOLOGY

## 2020-09-03 PROCEDURE — 99218 HC RM OBSERVATION: CPT

## 2020-09-03 PROCEDURE — 96376 TX/PRO/DX INJ SAME DRUG ADON: CPT

## 2020-09-03 PROCEDURE — 85025 COMPLETE CBC W/AUTO DIFF WBC: CPT

## 2020-09-03 PROCEDURE — 36415 COLL VENOUS BLD VENIPUNCTURE: CPT

## 2020-09-03 PROCEDURE — 99223 1ST HOSP IP/OBS HIGH 75: CPT | Performed by: OBSTETRICS & GYNECOLOGY

## 2020-09-03 PROCEDURE — 74011000258 HC RX REV CODE- 258: Performed by: OBSTETRICS & GYNECOLOGY

## 2020-09-03 RX ORDER — DOCUSATE SODIUM 100 MG/1
100 CAPSULE, LIQUID FILLED ORAL 2 TIMES DAILY
Status: DISCONTINUED | OUTPATIENT
Start: 2020-09-03 | End: 2020-09-05 | Stop reason: HOSPADM

## 2020-09-03 RX ORDER — ACETAMINOPHEN 500 MG
500 TABLET ORAL EVERY 6 HOURS
Status: DISCONTINUED | OUTPATIENT
Start: 2020-09-03 | End: 2020-09-05 | Stop reason: HOSPADM

## 2020-09-03 RX ORDER — HYDROMORPHONE HYDROCHLORIDE 1 MG/ML
1 INJECTION, SOLUTION INTRAMUSCULAR; INTRAVENOUS; SUBCUTANEOUS
Status: DISCONTINUED | OUTPATIENT
Start: 2020-09-03 | End: 2020-09-05 | Stop reason: HOSPADM

## 2020-09-03 RX ORDER — POLYETHYLENE GLYCOL 3350 17 G/17G
17 POWDER, FOR SOLUTION ORAL DAILY
Status: DISCONTINUED | OUTPATIENT
Start: 2020-09-03 | End: 2020-09-05 | Stop reason: HOSPADM

## 2020-09-03 RX ORDER — IBUPROFEN 600 MG/1
600 TABLET ORAL EVERY 6 HOURS
Status: DISCONTINUED | OUTPATIENT
Start: 2020-09-03 | End: 2020-09-05 | Stop reason: HOSPADM

## 2020-09-03 RX ORDER — PANTOPRAZOLE SODIUM 40 MG/10ML
40 INJECTION, POWDER, LYOPHILIZED, FOR SOLUTION INTRAVENOUS EVERY 12 HOURS
Status: DISCONTINUED | OUTPATIENT
Start: 2020-09-03 | End: 2020-09-03 | Stop reason: CLARIF

## 2020-09-03 RX ORDER — OXYCODONE HYDROCHLORIDE 5 MG/1
5-10 TABLET ORAL
Status: DISCONTINUED | OUTPATIENT
Start: 2020-09-03 | End: 2020-09-05 | Stop reason: HOSPADM

## 2020-09-03 RX ADMIN — IBUPROFEN 600 MG: 600 TABLET, FILM COATED ORAL at 06:45

## 2020-09-03 RX ADMIN — SODIUM CHLORIDE 40 MG: 9 INJECTION INTRAMUSCULAR; INTRAVENOUS; SUBCUTANEOUS at 09:10

## 2020-09-03 RX ADMIN — DOCUSATE SODIUM 100 MG: 100 CAPSULE, LIQUID FILLED ORAL at 09:10

## 2020-09-03 RX ADMIN — DOCUSATE SODIUM 100 MG: 100 CAPSULE, LIQUID FILLED ORAL at 17:28

## 2020-09-03 RX ADMIN — ACETAMINOPHEN 500 MG: 500 TABLET ORAL at 20:21

## 2020-09-03 RX ADMIN — ACETAMINOPHEN 500 MG: 500 TABLET ORAL at 06:44

## 2020-09-03 RX ADMIN — IBUPROFEN 600 MG: 600 TABLET, FILM COATED ORAL at 17:28

## 2020-09-03 RX ADMIN — IBUPROFEN 600 MG: 600 TABLET, FILM COATED ORAL at 12:13

## 2020-09-03 RX ADMIN — PIPERACILLIN AND TAZOBACTAM 3.38 G: 3; .375 INJECTION, POWDER, LYOPHILIZED, FOR SOLUTION INTRAVENOUS at 01:24

## 2020-09-03 RX ADMIN — POLYETHYLENE GLYCOL 3350 17 G: 17 POWDER, FOR SOLUTION ORAL at 09:10

## 2020-09-03 RX ADMIN — ACETAMINOPHEN 500 MG: 500 TABLET ORAL at 12:13

## 2020-09-03 RX ADMIN — SODIUM CHLORIDE 40 MG: 9 INJECTION INTRAMUSCULAR; INTRAVENOUS; SUBCUTANEOUS at 20:21

## 2020-09-03 RX ADMIN — PIPERACILLIN AND TAZOBACTAM 3.38 G: 3; .375 INJECTION, POWDER, LYOPHILIZED, FOR SOLUTION INTRAVENOUS at 09:09

## 2020-09-03 RX ADMIN — PIPERACILLIN AND TAZOBACTAM 3.38 G: 3; .375 INJECTION, POWDER, LYOPHILIZED, FOR SOLUTION INTRAVENOUS at 17:28

## 2020-09-03 NOTE — PROGRESS NOTES
2515- RN assisted pt to bathroom, Pt states that she feels hot and complains of 10/10 pain. Skin is hot to touch, Pt temperature 102.5.    0645- RN gave tylenol and motrin to aid temperature and aches.

## 2020-09-03 NOTE — PROGRESS NOTES
T.O.C.:              Pt expected to d/c to home              Family to provide transport at d/c              Emergency contact: Kain Donohue [phone]     CM met with pt at bedside; verified demographics, insurance, PCP and emergency contact. Pt expected to d/c to home with family to provide transport. Prior to admission pt ambulates independently; has adequate support. Pt does not have AMD, does not want to complete today. CM explained decision maker as next of kin, pt indicated she understood. Pt stated she will complete AMD at home. [facility] information given to pt for future needs. No other issues at present. CM will continue to follow.     Donte Arellano RN     Care Management Interventions  PCP Verified by CM: Yes(not sure)  Palliative Care Criteria Met (RRAT>21 & CHF Dx)?: No  MyChart Signup: No  Discharge Durable Medical Equipment: No  Physical Therapy Consult: No  Occupational Therapy Consult: No  Speech Therapy Consult: No  Current Support Network: Own Home(pt's 4 children live with her)  Confirm Follow Up Transport: Family  The Plan for Transition of Care is Related to the Following Treatment Goals : medically stable  Discharge Location  Discharge Placement: Home with family assistance      Donte Arellano RN        Observation notice provided in writing to patient and/or caregiver as well as verbal explanation of the policy.   Patients who are in outpatient status also receive the Observation notice.       Donte Arellano RN

## 2020-09-03 NOTE — ROUTINE PROCESS
TRANSFER - OUT REPORT:    Verbal report given to TRACEY Clayton(name) on Carlton Whitley  being transferred to (unit) for routine progression of care       Report consisted of patients Situation, Background, Assessment and   Recommendations(SBAR). Information from the following report(s) SBAR, ED Summary, [facility] and Recent Results was reviewed with the receiving nurse. Lines:   Peripheral IV 09/02/20 Right Antecubital (Active)   Site Assessment Clean, dry, & intact 09/02/20 1627   Phlebitis Assessment 0 09/02/20 1627   Infiltration Assessment 0 09/02/20 1627   Dressing Status Clean, dry, & intact 09/02/20 1627   Dressing Type Transparent 09/02/20 1627   Hub Color/Line Status Pink;Flushed;Patent 09/02/20 1627        Opportunity for questions and clarification was provided.       Patient transported with:   [employer]

## 2020-09-03 NOTE — PROGRESS NOTES
Bedside and Verbal shift change report given to Inessa Talamantes (oncoming nurse) by Matthias Alarcon RN (offgoing nurse). Report included the following information SBAR, Kardex, Intake/Output, MAR and Recent Results.

## 2020-09-03 NOTE — H&P
[facility] Gynecologic Oncology  History and Physical      Brett Emmanuel     [id number] : 1985    Admitted: 2020 Date: 9/3/2020     Subjective:     Brett Emmanuel is a 27 y.o.  premenopausal female who was referred to me for bilateral complex ovarian masses and a markedly elevated CA-125 by Dr. Donna Louis. The right ovary was markedly enlarged, containing a high density cyst with multiple septations. This was causing right-sided hydronephrosis. The hydroureter has been present since at least 2019. The left ovary contained a smaller, but similar appearing cyst.  A CA-125 was drawn and found to be elevated at >300. Other tumor markers, including AFP, HCG, LDH, inhibin A, inhibin B, AMH, DHEAS, and testosterone were negative. I was asked to see her in consultation for further evaluation and management. She had been seen in the ER a month ago with hematuria. That is when the CT scan, as well as a pelvic ultrasound, were done. I took her to the OR on 20 for laparoscopic resection of mass, total laparoscopic hysterectomy, bilateral salpingooophorectomy, appendectomy, cystoscopy. She had extensive pelvic endometriosis and a large right adnexal mass that was densely adherent to the pelvic sidewall and posterior uterus. EBL was 500 cc, as the pelvic peritoneal surfaces were very oozy secondary to the endometriosis. She was discharged home on POD #1. She presented to the ER yesterday afternoon complaining of fever and increasing abdominal pain. She reported some nausea, but no vomiting. She is passing gas but reports not having had a bowel movement since surgery. She reported a fever to 103 at home, but she was afebrile in the ER. Her WBC was only minimally elevated at 11.4 and her lactic acid was normal.  Her creatinine was normal.  A CT revealed what appeared to be a vaginal cuff abscess. We were consulted for admission.        Patient Active Problem List Diagnosis Date Noted    Pelvic fluid collection 09/02/2020    Endometriosis 08/24/2020    UTI (urinary tract infection) 01/05/2020    Complex cyst of right ovary 07/20/2019    Pain in pelvis 07/20/2019    Abscess 12/01/2016    History of depressed bipolar disorder [address] 11/23/2016    H/O schizophrenia 11/23/2016    Myalgia 11/23/2016    Perennial allergic rhinitis 11/23/2016    Iron deficiency 09/30/2015    Elevated cancer antigen 125 (CA-125) 09/27/2015    Nausea and vomiting 01/24/2013    Anemia 01/10/2013    Sickle cell trait ([address] 11/30/2012    Cyst of ovary 10/24/2012     Past Medical History:   Diagnosis Date    Abscess 12/1/2016    Anemia NEC     Bipolar 1 disorder ([address]     Breast tenderness     bilateral, since 11/2016, on and off     Cancer [facility]) 2008    CERVICAL    Depression     Dizziness     Gastrointestinal disorder     pancreatitis    GERD (gastroesophageal reflux disease)     Increased frequency of headaches     Kidney stones 10/2012    Nausea     Nipple discharge 2009    white/clear discharge since birth of child     Other ill-defined conditions(799.89)     sickle cell trait    Other ill-defined conditions(799.89)     anemia    Psychiatric disorder     depression, bi-polar    Psychotic disorder ([address]     bipolar 1    Sickle cell trait ([address]     Sickle cell trait ([facility]     Stomach pain       Past Surgical History:   Procedure Laterality Date    HX GYN      tubaligation    HX GYN      ectopic pregnancy surgery    HX OTHER SURGICAL      hx of blood transfusions      Social History     Tobacco Use    Smoking status: Current Every Day Smoker     Packs/day: 0.25     Years: 9.00     Pack years: 2.25    Smokeless tobacco: Never Used   Substance Use Topics    Alcohol use: No      Family History   Problem Relation Age of Onset    Heart Disease Father     Anesth Problems Neg Hx       Current Facility-Administered Medications   Medication Dose Route Frequency    oxyCODONE IR (ROXICODONE) tablet 5 mg  5 mg Oral Q4H PRN    acetaminophen (TYLENOL) tablet 500 mg  500 mg Oral Q6H PRN    ibuprofen (MOTRIN) tablet 600 mg  600 mg Oral Q6H PRN    0.9% sodium chloride infusion  100 mL/hr IntraVENous CONTINUOUS    piperacillin-tazobactam (ZOSYN) 3.375 g in 0.9% sodium chloride (MBP/ADV) 100 mL  3.375 g IntraVENous Q8H     Allergies   Allergen Reactions    Latex Itching    Cetirizine Vertigo     Other reaction(s): Bradycardia    Tramadol Vertigo    Naproxen Vertigo        Review of Systems:  A comprehensive review of systems was negative except for that written in the History of Present Illness. , 10 point ROS    Objective:     Physical Exam:   Visit Vitals  /70 (BP 1 Location: Right arm, BP Patient Position: At rest)   Pulse 100   Temp (!) 102.5 °F (39.2 °C)   Resp 18   Ht 5' 7.5\" (1.715 m)   Wt 141 lb (64 kg)   SpO2 96%   BMI 21.76 kg/m²     General:  Alert, cooperative, no distress, appears stated age. HEENT:  WNL. Neck: Supple, without masses. Lungs:   Clear to auscultation bilaterally. Chest wall:  No tenderness or deformity. Heart:  Regular rate and rhythm. Abdomen:   Soft, non-distended, mildly tender in the lower abdomen. No guarding/rebound. Incisions clean, dry, and intact. Genitalia:  Deferred. Rectal:  Deferred. Extremities: Extremities normal, atraumatic, no cyanosis or edema. Skin: Skin color, texture, turgor normal. No rashes or lesions. Lymph nodes: Cervical, supraclavicular, and axillary nodes normal.   Neurologic: CNII-XII intact. Labs:    Recent Results (from the past 24 hour(s))   SAMPLES BEING HELD    Collection Time: 09/02/20  4:17 PM   Result Value Ref Range    SAMPLES BEING HELD 1RED 1BLU     COMMENT        Add-on orders for these samples will be processed based on acceptable specimen integrity and analyte stability, which may vary by analyte.    CBC WITH AUTOMATED DIFF    Collection Time: 09/02/20  4:17 PM   Result Value Ref Range    WBC 11.4 (H) 3.6 - 11.0 K/uL    RBC 3.35 (L) 3.80 - 5.20 M/uL    HGB 8.8 (L) 11.5 - 16.0 g/dL    HCT 26.4 (L) 35.0 - 47.0 %    MCV 78.8 (L) 80.0 - 99.0 FL    MCH 26.3 26.0 - 34.0 PG    MCHC 33.3 30.0 - 36.5 g/dL    RDW 15.4 (H) 11.5 - 14.5 %    PLATELET 311 (H) 736 - 400 K/uL    MPV 11.2 8.9 - 12.9 FL    NRBC 0.0 0  WBC    ABSOLUTE NRBC 0.00 0.00 - 0.01 K/uL    NEUTROPHILS 85 (H) 32 - 75 %    LYMPHOCYTES 7 (L) 12 - 49 %    MONOCYTES 7 5 - 13 %    EOSINOPHILS 0 0 - 7 %    BASOPHILS 0 0 - 1 %    IMMATURE GRANULOCYTES 1 (H) 0.0 - 0.5 %    ABS. NEUTROPHILS 9.6 (H) 1.8 - 8.0 K/UL    ABS. LYMPHOCYTES 0.8 0.8 - 3.5 K/UL    ABS. MONOCYTES 0.8 0.0 - 1.0 K/UL    ABS. EOSINOPHILS 0.0 0.0 - 0.4 K/UL    ABS. BASOPHILS 0.0 0.0 - 0.1 K/UL    ABS. IMM. GRANS. 0.1 (H) 0.00 - 0.04 K/UL    DF AUTOMATED     METABOLIC PANEL, COMPREHENSIVE    Collection Time: 09/02/20  4:17 PM   Result Value Ref Range    Sodium 134 (L) 136 - 145 mmol/L    Potassium 3.4 (L) 3.5 - 5.1 mmol/L    Chloride 99 97 - 108 mmol/L    CO2 26 21 - 32 mmol/L    Anion gap 9 5 - 15 mmol/L    Glucose 101 (H) 65 - 100 mg/dL    BUN 8 6 - 20 MG/DL    Creatinine 1.00 0.55 - 1.02 MG/DL    BUN/Creatinine ratio 8 (L) 12 - 20      GFR est AA >60 >60 ml/min/1.73m2    GFR est non-AA >60 >60 ml/min/1.73m2    Calcium 9.4 8.5 - 10.1 MG/DL    Bilirubin, total 0.7 0.2 - 1.0 MG/DL    ALT (SGPT) 11 (L) 12 - 78 U/L    AST (SGOT) 9 (L) 15 - 37 U/L    Alk.  phosphatase 78 45 - 117 U/L    Protein, total 8.3 (H) 6.4 - 8.2 g/dL    Albumin 3.2 (L) 3.5 - 5.0 g/dL    Globulin 5.1 (H) 2.0 - 4.0 g/dL    A-G Ratio 0.6 (L) 1.1 - 2.2     LACTIC ACID    Collection Time: 09/02/20  4:17 PM   Result Value Ref Range    Lactic acid 0.9 0.4 - 2.0 MMOL/L   CBC WITH AUTOMATED DIFF    Collection Time: 09/03/20  7:11 AM   Result Value Ref Range    WBC 9.6 3.6 - 11.0 K/uL    RBC 2.90 (L) 3.80 - 5.20 M/uL    HGB 7.6 (L) 11.5 - 16.0 g/dL    HCT 22.5 (L) 35.0 - 47.0 %    MCV 77.6 (L) 80.0 - 99.0 FL    MCH 26.2 26.0 - 34.0 PG    MCHC 33.8 30.0 - 36.5 g/dL    RDW 15.4 (H) 11.5 - 14.5 %    PLATELET 442 190 - 185 K/uL    MPV 11.3 8.9 - 12.9 FL    NRBC 0.0 0  WBC    ABSOLUTE NRBC 0.00 0.00 - 0.01 K/uL    NEUTROPHILS 79 (H) 32 - 75 %    LYMPHOCYTES 10 (L) 12 - 49 %    MONOCYTES 10 5 - 13 %    EOSINOPHILS 0 0 - 7 %    BASOPHILS 0 0 - 1 %    IMMATURE GRANULOCYTES 1 (H) 0.0 - 0.5 %    ABS. NEUTROPHILS 7.7 1.8 - 8.0 K/UL    ABS. LYMPHOCYTES 1.0 0.8 - 3.5 K/UL    ABS. MONOCYTES 0.9 0.0 - 1.0 K/UL    ABS. EOSINOPHILS 0.0 0.0 - 0.4 K/UL    ABS. BASOPHILS 0.0 0.0 - 0.1 K/UL    ABS. IMM. GRANS. 0.1 (H) 0.00 - 0.04 K/UL    DF AUTOMATED          CT of abdomen/pelvis (9/2/20)  LOWER THORAX: No significant abnormality in the incidentally imaged lower chest.  LIVER: Subcentimeter presumed cysts are unchanged. No mass or abscess. BILIARY TREE: Gallbladder is within normal limits. CBD is not dilated. SPLEEN: within normal limits. PANCREAS: No mass or ductal dilatation. ADRENALS: Unremarkable. KIDNEYS: Severe right hydronephrosis and hydroureter are unchanged. Heterogeneous hypoenhancement of the left kidney is nonspecific. No renal mass  or left hydronephrosis. STOMACH: Incomplete distention, limited evaluation. SMALL BOWEL: No dilatation or wall thickening. COLON: Mild colonic fecal burden in the ascending and transverse colon. No  obstruction. APPENDIX: Appendectomy. PERITONEUM: No ascites or pneumoperitoneum. RETROPERITONEUM: No lymphadenopathy or aortic aneurysm. REPRODUCTIVE ORGANS: Hysterectomy and oophorectomy. Indistinct apex of the  vaginal cuff. Cranial to the vaginal cuff, irregular peripherally enhancing  collection of fluid more than gas measures 4.7 x 2.1 x 2.5 cm. In the right  pelvis, another fluid collection measures 2.3 x 2.2 x 5.8 cm. These collections  may communicate with one another. URINARY BLADDER: Incomplete distention, limited evaluation.  Distal right ureter  is decompressed. BONES: No destructive bone lesion. ABDOMINAL WALL: Post laparoscopic. No abscess. No hernia. ADDITIONAL COMMENTS: N/A     IMPRESSION:  1. Multiloculated ill-defined abscesses in the pelvis are superior to the  vaginal cuff and to the right of midline. Largest abscess measures up to 4.7 cm. Percutaneous drainage would be challenging given size and location. 2. 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 3. Hysterectomy and appendectomy are new. Assessment/Plan:     28 yo BF who is POD #10 s/p laparoscopic resection of mass, total laparoscopic hysterectomy, bilateral salpingooophorectomy, appendectomy, cystoscopy for extensive pelvic endometriosis and a large right adnexal mass/endometrioma. She now presents with a vaginal cuff abscess. She was admitted overnight and started on IV antibiotics. She is febrile to 102.5 this AM.  She is on PO ibuprofen and acetaminophen for fevers. She is in pain, but does not have an acute abdomen. We will continue with antibiotics for now and will follow her temp curve and WBC closely. We will repeat imaging at some point. We may ultimately need to drain the abscess transvaginally, but it might spontaneously drain. It is not amenable to percutaneous drainage and I would like to avoid having to do drain it laparoscopically.       Signed By: Pati Moran MD     September 3, 2020

## 2020-09-03 NOTE — PROGRESS NOTES
GYN Onc Short H&P Note    Admit with pelvic fluid collection from recent [address]. Concerns for vaginal cuff cellulitis. IV antibiotics, Zosyn. IVFs. Full H&P to follow in the morning.      Jamal Roe MD

## 2020-09-03 NOTE — PROGRESS NOTES
Bedside and Verbal shift change report given to CARLI Ozuna (oncoming nurse) by Mara Bella, RN & CONRADO Perez (offgoing nurse). Report included the following information SBAR, Kardex, ED Summary, Intake/Output, MAR, Accordion and Recent Results. 2010:  Yves Arteaga from [email or web address] called to notify of pt's blood cultures results 1 of 3 bottles drawn R antecubital results positive for Gram negative rods.

## 2020-09-04 LAB
BASOPHILS # BLD: 0 K/UL (ref 0–0.1)
BASOPHILS NFR BLD: 0 % (ref 0–1)
DIFFERENTIAL METHOD BLD: ABNORMAL
EOSINOPHIL # BLD: 0.2 K/UL (ref 0–0.4)
EOSINOPHIL NFR BLD: 3 % (ref 0–7)
ERYTHROCYTE [DISTWIDTH] IN BLOOD BY AUTOMATED COUNT: 15.9 % (ref 11.5–14.5)
HCT VFR BLD AUTO: 23 % (ref 35–47)
HGB BLD-MCNC: 7.7 G/DL (ref 11.5–16)
IMM GRANULOCYTES # BLD AUTO: 0 K/UL (ref 0–0.04)
IMM GRANULOCYTES NFR BLD AUTO: 1 % (ref 0–0.5)
LYMPHOCYTES # BLD: 1.3 K/UL (ref 0.8–3.5)
LYMPHOCYTES NFR BLD: 18 % (ref 12–49)
MCH RBC QN AUTO: 26.5 PG (ref 26–34)
MCHC RBC AUTO-ENTMCNC: 33.5 G/DL (ref 30–36.5)
MCV RBC AUTO: 79 FL (ref 80–99)
MONOCYTES # BLD: 0.6 K/UL (ref 0–1)
MONOCYTES NFR BLD: 8 % (ref 5–13)
NEUTS SEG # BLD: 4.9 K/UL (ref 1.8–8)
NEUTS SEG NFR BLD: 70 % (ref 32–75)
NRBC # BLD: 0 K/UL (ref 0–0.01)
NRBC BLD-RTO: 0 PER 100 WBC
PLATELET # BLD AUTO: 336 K/UL (ref 150–400)
PMV BLD AUTO: 11.3 FL (ref 8.9–12.9)
RBC # BLD AUTO: 2.91 M/UL (ref 3.8–5.2)
WBC # BLD AUTO: 7 K/UL (ref 3.6–11)

## 2020-09-04 PROCEDURE — 99232 SBSQ HOSP IP/OBS MODERATE 35: CPT | Performed by: OBSTETRICS & GYNECOLOGY

## 2020-09-04 PROCEDURE — 36415 COLL VENOUS BLD VENIPUNCTURE: CPT

## 2020-09-04 PROCEDURE — C9113 INJ PANTOPRAZOLE SODIUM, VIA: HCPCS | Performed by: OBSTETRICS & GYNECOLOGY

## 2020-09-04 PROCEDURE — 96376 TX/PRO/DX INJ SAME DRUG ADON: CPT

## 2020-09-04 PROCEDURE — 74011000250 HC RX REV CODE- 250: Performed by: OBSTETRICS & GYNECOLOGY

## 2020-09-04 PROCEDURE — 74011000258 HC RX REV CODE- 258: Performed by: OBSTETRICS & GYNECOLOGY

## 2020-09-04 PROCEDURE — 74011250637 HC RX REV CODE- 250/637: Performed by: OBSTETRICS & GYNECOLOGY

## 2020-09-04 PROCEDURE — 74011250636 HC RX REV CODE- 250/636: Performed by: OBSTETRICS & GYNECOLOGY

## 2020-09-04 PROCEDURE — 85025 COMPLETE CBC W/AUTO DIFF WBC: CPT

## 2020-09-04 PROCEDURE — 99218 HC RM OBSERVATION: CPT

## 2020-09-04 RX ORDER — AMOXICILLIN AND CLAVULANATE POTASSIUM 875; 125 MG/1; MG/1
1 TABLET, FILM COATED ORAL EVERY 12 HOURS
Qty: 20 TAB | Refills: 0 | Status: SHIPPED | OUTPATIENT
Start: 2020-09-04 | End: 2020-09-14

## 2020-09-04 RX ADMIN — ACETAMINOPHEN 500 MG: 500 TABLET ORAL at 15:01

## 2020-09-04 RX ADMIN — IBUPROFEN 600 MG: 600 TABLET, FILM COATED ORAL at 12:18

## 2020-09-04 RX ADMIN — SODIUM CHLORIDE 100 ML/HR: 900 INJECTION, SOLUTION INTRAVENOUS at 15:01

## 2020-09-04 RX ADMIN — IBUPROFEN 600 MG: 600 TABLET, FILM COATED ORAL at 07:03

## 2020-09-04 RX ADMIN — ACETAMINOPHEN 500 MG: 500 TABLET ORAL at 04:27

## 2020-09-04 RX ADMIN — PIPERACILLIN AND TAZOBACTAM 3.38 G: 3; .375 INJECTION, POWDER, LYOPHILIZED, FOR SOLUTION INTRAVENOUS at 08:40

## 2020-09-04 RX ADMIN — DOCUSATE SODIUM 100 MG: 100 CAPSULE, LIQUID FILLED ORAL at 18:20

## 2020-09-04 RX ADMIN — ACETAMINOPHEN 500 MG: 500 TABLET ORAL at 20:50

## 2020-09-04 RX ADMIN — PIPERACILLIN AND TAZOBACTAM 3.38 G: 3; .375 INJECTION, POWDER, LYOPHILIZED, FOR SOLUTION INTRAVENOUS at 00:30

## 2020-09-04 RX ADMIN — SODIUM CHLORIDE 40 MG: 9 INJECTION INTRAMUSCULAR; INTRAVENOUS; SUBCUTANEOUS at 08:40

## 2020-09-04 RX ADMIN — ACETAMINOPHEN 500 MG: 500 TABLET ORAL at 08:41

## 2020-09-04 RX ADMIN — SODIUM CHLORIDE 40 MG: 9 INJECTION INTRAMUSCULAR; INTRAVENOUS; SUBCUTANEOUS at 20:50

## 2020-09-04 RX ADMIN — DOCUSATE SODIUM 100 MG: 100 CAPSULE, LIQUID FILLED ORAL at 08:41

## 2020-09-04 RX ADMIN — PIPERACILLIN AND TAZOBACTAM 3.38 G: 3; .375 INJECTION, POWDER, LYOPHILIZED, FOR SOLUTION INTRAVENOUS at 17:04

## 2020-09-04 RX ADMIN — IBUPROFEN 600 MG: 600 TABLET, FILM COATED ORAL at 18:20

## 2020-09-04 NOTE — DISCHARGE INSTRUCTIONS
[address] [facility] [address], [address]  P [phone]  F [phone]     Sadaf Martins      Dear Ms. Damaso Wagner,      Please review your instructions with your nurse and ask any questions so you have all the information you need to recover well at home. If you do not feel you have everything you need to succeed and be safe after you leave the hospital, please discuss these concerns with your nurse. As always, call for any questions at home. Your doctor: Dr. Pati Moran  Diagnosis: Pelvic fluid collection [R18.8]  Procedure:   Date of Discharge: Take Home Medications     See Discharge Medication Review provided to you by your nurse. If you did not receive one, request this prior to your discharge. · It is important that you take your medications as they are prescribed. · Keep your medications in the bottles provided by the pharmacist and keep a list of the medication names, dosages, times to be taken and what they are for in your wallet. · Do not take other medications without consulting your doctor. · If you are prescribed enoxaparin (Lovenox) and are taking a baby aspirin daily, please hold this medication until your course of enoxaparin is completed. · If you no longer need your prescribed pain medication, you may take Tylenol or Aleve alone. · You should take a daily gentle stool softener such as a colace pill or dulcolax suppository for constipation as this is not uncommon after surgery and/or while on pain medication. If not prescribed, this can be found over the counter. If constipation persists for >24 hours you should take a dose of Milk of Magnesia. Call if your constipation continues. Diet    · Stay hydrated and drink fluids such as gatorade and water. This will also help prevent constipation and dehydration.  Limit somewhat any usual caffeine intake of beverages such as soda, tea and coffee as this may serve to dehydrate you. · For the first 2-3 days keep a low fiber diet avoiding raw vegetables or fruits with skin. A diet consisting of soup, cereal, yogurt, eggs, fish, Boost/Ensure. Avoid fatty/greasy foods. · If nauseated, keep your diet limited to liquids and call if this persists. Activity    · If possible, have someone with you at all times until you feel stable. · Gradually increase your activity each day. There are generally no restrictions on walking, climbing stairs or riding in a car. Try to walk at least 4 times per day. · Showers are okay. If you have an incision, no tub bathing/swimming for two weeks. · No driving for 2 week and/or while on pain medication. · There are no lifting restrictions if you did not have surgery. · If you had surgery, the following restrictions are in place:  1. If you had a laparoscopic procedure, no lifting greater than 25 lbs for 3 weeks. 2. If you had an open procedure, no heavy lifting greater than 15 lbs for 8 weeks. 3. If you had a hysterectomy, nothing per vagina for 6-8 weeks. 4. If you had any type of vaginal procedure, you may experience vaginal spotting. Should the bleeding require more that 4 pads a day please call our office. Incision    · You should expect some discomfort in the area of your incision, particularly as you increase your activity. If you notice an area of increasing redness or new drainage, please call your doctor. · Staples are generally removed in 10-14 days. · Many incisions will have buried absorbable sutures, which do not need to be removed and are covered by protective glue. This will come off over time. Causes For Concern    If any of the following occur, please call our office and speak with the Nurse/aid who will help you with your problem or ask the doctor to call you.  Problems with the incision, including increasing pain, swelling, redness or drainage.     Inability to pass urine    Increasing abdominal pain despite medication   Persisting nausea or vomiting.  Fever or chills and a temperature >101F   Constipation (no bowel movement for three days).  Diarrhea (more than three watery stools within 24 hours).  Excessive vaginal or wound bleeding.  If after hours and you cannot reach an on-call physician, call 911. Follow-Up    Call [phone] to schedule an appointment with Dr. Katelyn William in 2 weeks. Information obtained by :  I understand that if any problems occur once I am at home I am to contact my physician. I understand and acknowledge receipt of the instructions indicated above.                                                                                                                                            Physician's or R.N.'s Signature                                                                  Date/Time                                                                                                                                              Patient or Representative Signature                                                          Date/Time

## 2020-09-04 NOTE — PROGRESS NOTES
[facility]  [address] [facility], [address], [address]  P [phone]  F [phone]       Patient Name: Nieves Singh Date: 9/2/2020   OR Date: 8/24/2020   Visit Date: 9/4/2020        SUBJECTIVE:    Feeling better this morning. Pain improved. No nausea/vomiting. Tolerating PO. BM yesterday. Vaginal drainage increased. She is a bit tearful this morning. Wants to go home. OBJECTIVE:    Patient Vitals for the past 24 hrs:   Temp Pulse Resp BP SpO2   09/04/20 0838 99.3 °F (37.4 °C) 76 16 106/67 --   09/04/20 0430 98.6 °F (37 °C) -- -- -- --   09/04/20 0034 97.6 °F (36.4 °C) 67 14 92/57 100 %   09/03/20 2021 97.9 °F (36.6 °C) 87 18 108/64 100 %   09/03/20 1611 97.7 °F (36.5 °C) -- -- -- --       Date 09/03/20 0700 - 09/04/20 0659 09/04/20 0700 - 09/05/20 0659   Shift 0950-9823 6797-3478 24 Hour Total 3244-1757 0509-3817 24 Hour Total   INTAKE   I.V.(mL/kg/hr)  400(0.5) 400(0.3)        Volume (0.9% sodium chloride infusion)  400 400      Shift Total(mL/kg)  400(6.3) 400(6.3)      OUTPUT   Urine(mL/kg/hr)           Urine Occurrence(s)  3 x 3 x      Stool           Stool Occurrence(s) 1 x  1 x      Shift Total(mL/kg)         NET  400 400      Weight (kg) 64 64 64 64 64 64       Physical Exam     General:  alert, cooperative, no distress     Cardiac:  Regular rate and rhythm        Lungs:  clear to auscultation bilaterally  Abdomen:  soft, non-distended, minimally tender       Wound:  clean, dry, no drainage   Extremity: extremities normal, atraumatic, no cyanosis or edema    Data Review  Lab Results   Component Value Date/Time    WBC 7.0 09/04/2020 04:22 AM    ABS.  NEUTROPHILS 4.9 09/04/2020 04:22 AM    HGB 7.7 (L) 09/04/2020 04:22 AM    HCT 23.0 (L) 09/04/2020 04:22 AM    MCV 79.0 (L) 09/04/2020 04:22 AM    MCH 26.5 09/04/2020 04:22 AM    PLATELET 349 58/84/9775 04:22 AM     Lab Results   Component Value Date/Time    Sodium 134 (L) 09/02/2020 04:17 PM    Potassium 3.4 (L) 09/02/2020 04:17 PM    Chloride 99 09/02/2020 04:17 PM    CO2 26 09/02/2020 04:17 PM    Glucose 101 (H) 09/02/2020 04:17 PM    BUN 8 09/02/2020 04:17 PM    Creatinine 1.00 09/02/2020 04:17 PM    Calcium 9.4 09/02/2020 04:17 PM    Albumin 3.2 (L) 09/02/2020 04:17 PM    Bilirubin, total 0.7 09/02/2020 04:17 PM    ALT (SGPT) 11 (L) 09/02/2020 04:17 PM    Alk. phosphatase 78 09/02/2020 04:17 PM     IMPRESSION/PLAN:    Gynecologic:  POD #11 s/p laparoscopic resection of mass, total laparoscopic hysterectomy, bilateral salpingooophorectomy, appendectomy, cystoscopy for extensive pelvic endometriosis and a large right adnexal mass/endometrioma. She now presents with a vaginal cuff abscess. Appears to be draining spontaneously through the vaginal cuff. Heme/CV:  Anemic with Hgb down to 7.7. Asymptomatic. Renal:  Good UOP. Normal creatinine. FEN/GI:  Diet as tolerated. Continue Miralax and Colace. ID/Wound:  Afebrile now for >24 hours. WBC down to 7.0 from 11.4 at time of admission. Will continue current antibiotic regimen. If remains afebrile for another 48 hours and her WBC remains normal, we may consider discharge on PO antibiotics. Will consider repeat imaging prior to discharge to make sure the abscess is resolving. PPX:  Ambulation. Dispostion:  Postoperative course complicated by a vaginal cuff abscess. Possible discharge over the weekend if current course continues.           Godfrey Johnston MD

## 2020-09-04 NOTE — PROGRESS NOTES
Bedside and Verbal shift change report given to Dennis Kaur. (oncoming nurse) by Briseyda Almonte (offgoing nurse). Report included the following information SBAR, Kardex, ED Summary, Procedure Summary, Intake/Output, MAR and Recent Results.

## 2020-09-04 NOTE — PROGRESS NOTES
Bedside and Verbal shift change report given to CARLI Russell (oncoming nurse) by Sampson Hidalgo RN (offgoing nurse). Report included the following information SBAR, Kardex, ED Summary, Procedure Summary, Intake/Output, MAR, Accordion and Recent Results.

## 2020-09-04 NOTE — PROGRESS NOTES
Bedside and Verbal shift change report given to [employer] (oncoming nurse) by Arnold Cisse RN (offgoing nurse). Report included the following information SBAR, Kardex, Intake/Output, MAR, Accordion and Recent Results.

## 2020-09-05 VITALS
RESPIRATION RATE: 16 BRPM | WEIGHT: 141 LBS | HEIGHT: 68 IN | TEMPERATURE: 97.9 F | OXYGEN SATURATION: 100 % | BODY MASS INDEX: 21.37 KG/M2 | DIASTOLIC BLOOD PRESSURE: 68 MMHG | HEART RATE: 72 BPM | SYSTOLIC BLOOD PRESSURE: 116 MMHG

## 2020-09-05 LAB
BASOPHILS # BLD: 0.1 K/UL (ref 0–0.1)
BASOPHILS NFR BLD: 1 % (ref 0–1)
DIFFERENTIAL METHOD BLD: ABNORMAL
EOSINOPHIL # BLD: 0.3 K/UL (ref 0–0.4)
EOSINOPHIL NFR BLD: 5 % (ref 0–7)
ERYTHROCYTE [DISTWIDTH] IN BLOOD BY AUTOMATED COUNT: 16.1 % (ref 11.5–14.5)
HCT VFR BLD AUTO: 19.2 % (ref 35–47)
HGB BLD-MCNC: 6.4 G/DL (ref 11.5–16)
IMM GRANULOCYTES # BLD AUTO: 0 K/UL (ref 0–0.04)
IMM GRANULOCYTES NFR BLD AUTO: 0 % (ref 0–0.5)
LYMPHOCYTES # BLD: 1.4 K/UL (ref 0.8–3.5)
LYMPHOCYTES NFR BLD: 23 % (ref 12–49)
MCH RBC QN AUTO: 26.4 PG (ref 26–34)
MCHC RBC AUTO-ENTMCNC: 33.3 G/DL (ref 30–36.5)
MCV RBC AUTO: 79.3 FL (ref 80–99)
MONOCYTES # BLD: 0.5 K/UL (ref 0–1)
MONOCYTES NFR BLD: 8 % (ref 5–13)
NEUTS SEG # BLD: 3.6 K/UL (ref 1.8–8)
NEUTS SEG NFR BLD: 63 % (ref 32–75)
NRBC # BLD: 0 K/UL (ref 0–0.01)
NRBC BLD-RTO: 0 PER 100 WBC
PLATELET # BLD AUTO: 339 K/UL (ref 150–400)
PMV BLD AUTO: 12 FL (ref 8.9–12.9)
RBC # BLD AUTO: 2.42 M/UL (ref 3.8–5.2)
RBC MORPH BLD: ABNORMAL
WBC # BLD AUTO: 5.9 K/UL (ref 3.6–11)

## 2020-09-05 PROCEDURE — 99218 HC RM OBSERVATION: CPT

## 2020-09-05 PROCEDURE — 74011000258 HC RX REV CODE- 258: Performed by: OBSTETRICS & GYNECOLOGY

## 2020-09-05 PROCEDURE — 74011250637 HC RX REV CODE- 250/637: Performed by: OBSTETRICS & GYNECOLOGY

## 2020-09-05 PROCEDURE — 36415 COLL VENOUS BLD VENIPUNCTURE: CPT

## 2020-09-05 PROCEDURE — 96376 TX/PRO/DX INJ SAME DRUG ADON: CPT

## 2020-09-05 PROCEDURE — 74011250636 HC RX REV CODE- 250/636: Performed by: OBSTETRICS & GYNECOLOGY

## 2020-09-05 PROCEDURE — 85025 COMPLETE CBC W/AUTO DIFF WBC: CPT

## 2020-09-05 RX ORDER — FERROUS SULFATE 325(65) MG
325 TABLET, DELAYED RELEASE (ENTERIC COATED) ORAL 2 TIMES DAILY WITH MEALS
Qty: 60 TAB | Refills: 0 | Status: SHIPPED | OUTPATIENT
Start: 2020-09-05 | End: 2020-10-05

## 2020-09-05 RX ADMIN — ACETAMINOPHEN 500 MG: 500 TABLET ORAL at 04:03

## 2020-09-05 RX ADMIN — IBUPROFEN 600 MG: 600 TABLET, FILM COATED ORAL at 06:07

## 2020-09-05 RX ADMIN — IBUPROFEN 600 MG: 600 TABLET, FILM COATED ORAL at 00:38

## 2020-09-05 RX ADMIN — PIPERACILLIN AND TAZOBACTAM 3.38 G: 3; .375 INJECTION, POWDER, LYOPHILIZED, FOR SOLUTION INTRAVENOUS at 00:39

## 2020-09-05 NOTE — PROGRESS NOTES
Bedside and Verbal shift change report given to Burton Harris RN  (oncoming nurse) by Jacob Wheat. Annika Malone  (offgoing nurse). Report included the following information SBAR, Kardex, ED Summary, MAR, Accordion and Recent Results. I have reviewed discharge instructions with the patient. The patient verbalized understanding.

## 2020-09-05 NOTE — PROGRESS NOTES
[facility]  [address] [place] [address], [address], [address]  P [phone]  F [phone]       Patient Name: Teresita Wagner Date: 9/2/2020   OR Date: 8/24/2020   Visit Date: 9/5/2020        SUBJECTIVE:    Feels good this morning. Denies pain. Denies nausea/vomiting. Tolerating PO. BM yesterday. Vaginal drainage stable. Afebrile >48 hours. The patient reports she is ready to go home. Slightly anemic this morning at Hgb 6.4. Denies CP, SOB, light-headedness, dizziness. OBJECTIVE:    Patient Vitals for the past 24 hrs:   Temp Pulse Resp BP SpO2   09/05/20 0039 97.6 °F (36.4 °C) 77 16 101/67 100 %   09/04/20 2035 98.2 °F (36.8 °C) 77 16 99/65 --   09/04/20 1218 98 °F (36.7 °C) -- -- -- --   09/04/20 0838 99.3 °F (37.4 °C) 76 16 106/67 --       Date 09/04/20 0700 - 09/05/20 0659 09/05/20 0700 - 09/06/20 0659   Shift 0469-6716 9264-9097 24 Hour Total 4856-5779 4609-3504 24 Hour Total   INTAKE   P.O. 360  360        P. O. 360  360      Shift Total(mL/kg) 360(5.6)  360(5.6)      OUTPUT   Shift Total(mL/kg)           360      Weight (kg) 64 64 64 64 64 64       Physical Exam     General:  alert, cooperative, no distress     Cardiac:  Regular rate and rhythm        Lungs:  clear to auscultation bilaterally  Abdomen:  soft, non-distended, minimally tender       Wound:  clean, dry, no drainage   Extremity: extremities normal, atraumatic, no cyanosis or edema    Data Review  Lab Results   Component Value Date/Time    WBC 5.9 09/05/2020 06:12 AM    ABS.  NEUTROPHILS 3.6 09/05/2020 06:12 AM    HGB 6.4 (L) 09/05/2020 06:12 AM    HCT 19.2 (L) 09/05/2020 06:12 AM    MCV 79.3 (L) 09/05/2020 06:12 AM    MCH 26.4 09/05/2020 06:12 AM    PLATELET 840 11/87/5312 06:12 AM     Lab Results   Component Value Date/Time    Sodium 134 (L) 09/02/2020 04:17 PM    Potassium 3.4 (L) 09/02/2020 04:17 PM    Chloride 99 09/02/2020 04:17 PM    CO2 26 09/02/2020 04:17 PM    Glucose 101 (H) 09/02/2020 04:17 PM    BUN 8 09/02/2020 04:17 PM    Creatinine 1.00 09/02/2020 04:17 PM    Calcium 9.4 09/02/2020 04:17 PM    Albumin 3.2 (L) 09/02/2020 04:17 PM    Bilirubin, total 0.7 09/02/2020 04:17 PM    ALT (SGPT) 11 (L) 09/02/2020 04:17 PM    Alk. phosphatase 78 09/02/2020 04:17 PM     IMPRESSION/PLAN:    Gynecologic:  POD #12 s/p laparoscopic resection of mass, total laparoscopic hysterectomy, bilateral salpingooophorectomy, appendectomy, cystoscopy for extensive pelvic endometriosis and a large right adnexal mass/endometrioma. She now presents with a vaginal cuff abscess. Appears to be draining spontaneously through the vaginal cuff. WBC normalized. Heme/CV:  Anemic with Hgb down to 6.4. Asymptomatic. VSS WNL. Not tachycardic. Discussed iron supplementation. Patient is ready to go home. Renal:  Good UOP. Normal creatinine. FEN/GI:  Diet as tolerated. Continue Miralax and Colace. ID/Wound:  Afebrile now for >48 hours. WBC down to 5.9 from 7.0 from 11.4 at time of admission. Giving her continued vaginal drainage, I believe her abscess is draining vaginally. She has improved clinically and normalized WBC. Discharge home today was discussed by Dr. Dang Gutierrez with the patient yesterday. Given she has improved clinically, will d/c home to complete course of augmentin. Gerldine Prost PPX:  Ambulation. Dispostion:  Postoperative course complicated by a vaginal cuff abscess. Afebrile >48 hours. Patient ready to go home. Improved clinical as per above. D/c home today to complete course of Augmentin; and given iron supplementation.           Corry Ogden MD

## 2020-09-07 LAB
BACTERIA SPEC CULT: ABNORMAL
SERVICE CMNT-IMP: ABNORMAL

## 2020-09-24 ENCOUNTER — OFFICE VISIT (OUTPATIENT)
Dept: GYNECOLOGY | Age: 35
End: 2020-09-24
Payer: MEDICAID

## 2020-09-24 VITALS
HEIGHT: 68 IN | SYSTOLIC BLOOD PRESSURE: 116 MMHG | DIASTOLIC BLOOD PRESSURE: 81 MMHG | BODY MASS INDEX: 19.52 KG/M2 | WEIGHT: 128.8 LBS | HEART RATE: 100 BPM

## 2020-09-24 DIAGNOSIS — N73.9 PELVIC ABSCESS IN FEMALE: ICD-10-CM

## 2020-09-24 DIAGNOSIS — R19.00 PELVIC MASS: Primary | ICD-10-CM

## 2020-09-24 DIAGNOSIS — N80.9 ENDOMETRIOSIS: ICD-10-CM

## 2020-09-24 PROCEDURE — 99024 POSTOP FOLLOW-UP VISIT: CPT | Performed by: OBSTETRICS & GYNECOLOGY

## 2020-09-24 RX ORDER — MEDROXYPROGESTERONE ACETATE 5 MG/1
5 TABLET ORAL DAILY
Qty: 30 TAB | Refills: 11 | Status: SHIPPED | OUTPATIENT
Start: 2020-09-24

## 2020-09-24 NOTE — PROGRESS NOTES
[facility]  [address] [facility], [address], [address]  P [phone]  F [phone]    Postoperative Office Note  Patient ID:  Name: Afua Sparks  MRM: [id number]  : 1985/34 y.o. Date: 2020    Diagnosis:     ICD-10-CM ICD-9-CM    1. Pelvic mass  R19.00 789.30    2. Endometriosis  N80.9 617.9    3. Pelvic abscess in female  N73.9 614.4        Problem List:   Patient Active Problem List   Diagnosis Code    Cyst of ovary N83.209    Sickle cell trait (HCC) D57.3    Anemia D64.9    Nausea and vomiting R11.2    Elevated cancer antigen 125 (CA-125) R97.1    Iron deficiency E61.1    History of depressed bipolar disorder ([facility] [address] F31.70    H/O schizophrenia Z86.59    Myalgia M79.10    Perennial allergic rhinitis J30.89    Abscess L02.91    UTI (urinary tract infection) N39.0    Complex cyst of right ovary N83.291    Pain in pelvis R10.2    Endometriosis N80.9    Pelvic fluid collection R18.8       SUBJECTIVE:    Afua Sparks is a 27 y.o. female who presents for followup postoperative care following laparoscopic resection of mass (5-10 cm), total laparoscopic hysterectomy, bilateral salpingooophorectomy, appendectomy, cystoscopy. Operative findings:  Large right adnexal mass.  Extensive pelvic endometriosis.  No evidence of malignancy on frozen section pathology. The patient's pathology revealed: FINAL PATHOLOGIC DIAGNOSIS   1. Labeled as pelvic mass, biopsy:   Deposit of benign secretory endometrium compatible with endometriosis   2. Uterus, bilateral ovaries, portions of bilateral fallopian tube, and appendix, hysterectomy, bilateral salpingo-oophorectomy, and appendectomy:   Benign bilateral ovaries with endometriomas, deposits of endometriosis, and physiologic changes   Uterus.  324 g:   Serosa: Deposits of endometriosis   Cervix: Benign uterine cervix with chronic cervicitis and nabothian cysts   Endometrium: Benign endometrial polyp with background benign secretory endometrium   Myometrium: Adenomyosis   Benign left fallopian tube remnant, right fallopian tube, and appendix with deposits of endometriosis     Her postoperative course was complicated by a vaginal cuff abscess requiring hospitalization and IV antibiotics, but she did not require surgical intervention. It spontaneously drained and she was discharged on a prolonged course of PO antibiotics. She continues to have pelvic pain, but it has improved. She still reports vaginal discharge, but that has also improved. Medications:     Current Outpatient Medications on File Prior to Visit   Medication Sig Dispense Refill    ibuprofen (MOTRIN) 800 mg tablet Take 1 Tab by mouth every eight (8) hours as needed for Pain. 40 Tab 0    docusate sodium (COLACE) 100 mg capsule Take 1 Cap by mouth two (2) times a day. Stop for loose stools 60 Cap 0    ferrous sulfate (IRON) 325 mg (65 mg iron) EC tablet Take 1 Tab by mouth two (2) times daily (with meals) for 30 days. 60 Tab 0     No current facility-administered medications on file prior to visit. Allergies:      Allergies   Allergen Reactions    Latex Itching    Cetirizine Vertigo     Other reaction(s): Bradycardia    Tramadol Vertigo    Naproxen Vertigo     Past Medical History:   Diagnosis Date    Abscess 12/1/2016    Anemia NEC     Bipolar 1 disorder ([facility] [address]     Breast tenderness     bilateral, since 11/2016, on and off     Cancer [facility]) 2008    CERVICAL    Depression     Dizziness     Gastrointestinal disorder     pancreatitis    GERD (gastroesophageal reflux disease)     Increased frequency of headaches     Kidney stones 10/2012    Nausea     Nipple discharge 2009    white/clear discharge since birth of child     Other ill-defined conditions(799.89)     sickle cell trait    Other ill-defined conditions(799.89)     anemia    Psychiatric disorder     depression, bi-polar    Psychotic disorder ([address]     bipolar 1    Sickle cell trait (HCC)     Sickle cell trait ([facility] [address]     Stomach pain      Past Surgical History:   Procedure Laterality Date    HX GYN      tubaligation    HX GYN      ectopic pregnancy surgery    HX OTHER SURGICAL      hx of blood transfusions     Social History     Socioeconomic History    Marital status: SINGLE     Spouse name: Not on file    Number of children: Not on file    Years of education: Not on file    Highest education level: Not on file   Occupational History    Not on file   Social Needs    Financial resource strain: Not on file    Food insecurity     Worry: Not on file     Inability: Not on file    Transportation needs     Medical: Not on file     Non-medical: Not on file   Tobacco Use    Smoking status: Current Every Day Smoker     Packs/day: 0.25     Years: 9.00     Pack years: 2.25    Smokeless tobacco: Never Used   Substance and Sexual Activity    Alcohol use: No    Drug use: Not Currently     Comment: occasional    Sexual activity: Yes     Partners: Male     Birth control/protection: Surgical     Comment: Tubal   Lifestyle    Physical activity     Days per week: Not on file     Minutes per session: Not on file    Stress: Not on file   Relationships    Social connections     Talks on phone: Not on file     Gets together: Not on file     Attends Mandaeism service: Not on file     Active member of club or organization: Not on file     Attends meetings of clubs or organizations: Not on file     Relationship status: Not on file    Intimate partner violence     Fear of current or ex partner: Not on file     Emotionally abused: Not on file     Physically abused: Not on file     Forced sexual activity: Not on file   Other Topics Concern    Not on file   Social History Narrative    Not on file       OBJECTIVE:    Vitals:   Vitals:    09/24/20 1544   BP: 116/81   Pulse: 100   Weight: 128 lb 12.8 oz (58.4 kg)   Height: 5' 7.52\" (1.715 m)     Physical Examination:     General: alert, cooperative, no distress   Lungs: lungs clear to auscultation   Cardiac: Regular rate and rhythm   Abdomen: soft, bowel sounds active, non-tender  No CVA tenderness   Incision: healing well   Pelvic: External genitalia: normal general appearance  Urinary system: urethral meatus normal  Vaginal: cuff remains inflamed. Small opening at right apex. Mild drainage noted. Tender on bimanual exam.  Cervix: absent  Adnexa: removed surgically  Uterus: absent   Rectal: not done   Extremity:   extremities normal, atraumatic, no cyanosis or edema     IMPRESSION:    Christopher Alvarez is doing well postoperatively. She has a diagnosis of sever pelvic endometriosis. Her postoperative course was complicated by a vaginal cuff abscess. Medical problems:     Patient Active Problem List   Diagnosis Code    Cyst of ovary N83.209    Sickle cell trait ([facility]) D57.3    Anemia D64.9    Nausea and vomiting R11.2    Elevated cancer antigen 125 (CA-125) R97.1    Iron deficiency E61.1    History of depressed bipolar disorder ([facility]) F31.70    H/O schizophrenia Z86.59    Myalgia M79.10    Perennial allergic rhinitis J30.89    Abscess L02.91    UTI (urinary tract infection) N39.0    Complex cyst of right ovary N83.291    Pain in pelvis R10.2    Endometriosis N80.9    Pelvic fluid collection R18.8       PLAN:    The operative procedures and clinical results have been reviewed with the patient. Implications of diagnosis discussed at length. All questions answered. I will see her back in 2-3 weeks to reevaluate. I am going to start her on daily Provera to suppress the endometriosis. We can consider stopping that after 6 months to a year. The patient is advised to call our office with any problems or concerns.     Scott Thapa MD  9/24/2020/1:20 PM

## 2020-10-15 ENCOUNTER — OFFICE VISIT (OUTPATIENT)
Dept: GYNECOLOGY | Age: 35
End: 2020-10-15
Payer: MEDICAID

## 2020-10-15 VITALS
WEIGHT: 134 LBS | SYSTOLIC BLOOD PRESSURE: 107 MMHG | HEIGHT: 68 IN | DIASTOLIC BLOOD PRESSURE: 72 MMHG | BODY MASS INDEX: 20.31 KG/M2

## 2020-10-15 DIAGNOSIS — N73.9 PELVIC ABSCESS IN FEMALE: ICD-10-CM

## 2020-10-15 DIAGNOSIS — N80.9 ENDOMETRIOSIS: Primary | ICD-10-CM

## 2020-10-15 PROCEDURE — 99024 POSTOP FOLLOW-UP VISIT: CPT | Performed by: OBSTETRICS & GYNECOLOGY

## 2020-10-15 NOTE — PROGRESS NOTES
[facility] GYNECOLOGIC ONCOLOGY  [address] [facility]  P [phone]  F [phone]    Postoperative Office Note  Patient ID:  Name: Beata Farris  MRM: [id number]  : 1985/34 y.o. Date: 10/15/2020    Diagnosis:     ICD-10-CM ICD-9-CM    1. Endometriosis  N80.9 617.9    2. Pelvic abscess in female  N73.9 614.4        Problem List:   Patient Active Problem List   Diagnosis Code    Cyst of ovary N83.209    Sickle cell trait (HCC) D57.3    Anemia D64.9    Nausea and vomiting R11.2    Elevated cancer antigen 125 (CA-125) R97.1    Iron deficiency E61.1    History of depressed bipolar disorder ([address] F31.70    H/O schizophrenia Z86.59    Myalgia M79.10    Perennial allergic rhinitis J30.89    Abscess L02.91    UTI (urinary tract infection) N39.0    Complex cyst of right ovary N83.291    Pain in pelvis R10.2    Endometriosis N80.9    Pelvic fluid collection R18.8       SUBJECTIVE:    Beata Farris is a 27 y.o. female who presents for followup postoperative care following laparoscopic resection of mass (5-10 cm), total laparoscopic hysterectomy, bilateral salpingooophorectomy, appendectomy, cystoscopy. Operative findings:  Large right adnexal mass.  Extensive pelvic endometriosis.  No evidence of malignancy on frozen section pathology. The patient's pathology revealed: FINAL PATHOLOGIC DIAGNOSIS   1. Labeled as pelvic mass, biopsy:   Deposit of benign secretory endometrium compatible with endometriosis   2. Uterus, bilateral ovaries, portions of bilateral fallopian tube, and appendix, hysterectomy, bilateral salpingo-oophorectomy, and appendectomy:   Benign bilateral ovaries with endometriomas, deposits of endometriosis, and physiologic changes   Uterus.  324 g:   Serosa: Deposits of endometriosis   Cervix: Benign uterine cervix with chronic cervicitis and nabothian cysts   Endometrium: Benign endometrial polyp with background benign secretory endometrium   Myometrium: Adenomyosis   Benign left fallopian tube remnant, right fallopian tube, and appendix with deposits of endometriosis     Her postoperative course was complicated by a vaginal cuff abscess requiring hospitalization and IV antibiotics, but she did not require surgical intervention. It spontaneously drained and she was discharged on a prolonged course of PO antibiotics. Her pelvic pain continues to improve. She still reports vaginal discharge, but that has also improved. Medications:     Current Outpatient Medications on File Prior to Visit   Medication Sig Dispense Refill    medroxyPROGESTERone (PROVERA) 5 mg tablet Take 1 Tab by mouth daily. 30 Tab 11    ibuprofen (MOTRIN) 800 mg tablet Take 1 Tab by mouth every eight (8) hours as needed for Pain. 40 Tab 0    docusate sodium (COLACE) 100 mg capsule Take 1 Cap by mouth two (2) times a day. Stop for loose stools 60 Cap 0     No current facility-administered medications on file prior to visit. Allergies:      Allergies   Allergen Reactions    Latex Itching    Cetirizine Vertigo     Other reaction(s): Bradycardia    Tramadol Vertigo    Naproxen Vertigo     Past Medical History:   Diagnosis Date    Abscess 12/1/2016    Anemia NEC     Bipolar 1 disorder ([address]     Breast tenderness     bilateral, since 11/2016, on and off     Cancer [facility]) 2008    CERVICAL    Depression     Dizziness     Gastrointestinal disorder     pancreatitis    GERD (gastroesophageal reflux disease)     Increased frequency of headaches     Kidney stones 10/2012    Nausea     Nipple discharge 2009    white/clear discharge since birth of child     Other ill-defined conditions(799.89)     sickle cell trait    Other ill-defined conditions(799.89)     anemia    Psychiatric disorder     depression, bi-polar    Psychotic disorder ([address]     bipolar 1    Sickle cell trait ([address]     Sickle cell trait ([address]     Stomach pain      Past Surgical History:   Procedure Laterality Date    HX GYN      tubaligation    HX GYN      ectopic pregnancy surgery    HX OTHER SURGICAL      hx of blood transfusions     Social History     Socioeconomic History    Marital status: SINGLE     Spouse name: Not on file    Number of children: Not on file    Years of education: Not on file    Highest education level: Not on file   Occupational History    Not on file   Social Needs    Financial resource strain: Not on file    Food insecurity     Worry: Not on file     Inability: Not on file    Transportation needs     Medical: Not on file     Non-medical: Not on file   Tobacco Use    Smoking status: Current Every Day Smoker     Packs/day: 0.25     Years: 9.00     Pack years: 2.25    Smokeless tobacco: Never Used   Substance and Sexual Activity    Alcohol use: No    Drug use: Not Currently     Comment: occasional    Sexual activity: Yes     Partners: Male     Birth control/protection: Surgical     Comment: Tubal   Lifestyle    Physical activity     Days per week: Not on file     Minutes per session: Not on file    Stress: Not on file   Relationships    Social connections     Talks on phone: Not on file     Gets together: Not on file     Attends Restorationism service: Not on file     Active member of club or organization: Not on file     Attends meetings of clubs or organizations: Not on file     Relationship status: Not on file    Intimate partner violence     Fear of current or ex partner: Not on file     Emotionally abused: Not on file     Physically abused: Not on file     Forced sexual activity: Not on file   Other Topics Concern    Not on file   Social History Narrative    Not on file       OBJECTIVE:    Vitals:   Vitals:    10/15/20 1547   BP: 107/72   Weight: 134 lb (60.8 kg)   Height: 5' 7.52\" (1.715 m)     Physical Examination:     General:  alert, cooperative, no distress   Lungs: lungs clear to auscultation   Cardiac: Regular rate and rhythm   Abdomen: soft, bowel sounds active, non-tender  No CVA tenderness   Incision: healing well   Pelvic: External genitalia: normal general appearance  Urinary system: urethral meatus normal  Vaginal: cuff remains inflamed. Small opening at right apex. Mild drainage noted. Minimally tender on bimanual exam.  Cervix: absent  Adnexa: removed surgically  Uterus: absent   Rectal: not done   Extremity:   extremities normal, atraumatic, no cyanosis or edema     IMPRESSION:    Luz Maria Lopez is doing well postoperatively. She has a diagnosis of sever pelvic endometriosis. Her postoperative course was complicated by a vaginal cuff abscess. Medical problems:     Patient Active Problem List   Diagnosis Code    Cyst of ovary N83.209    Sickle cell trait ([facility]) D57.3    Anemia D64.9    Nausea and vomiting R11.2    Elevated cancer antigen 125 (CA-125) R97.1    Iron deficiency E61.1    History of depressed bipolar disorder ([facility]) F31.70    H/O schizophrenia Z86.59    Myalgia M79.10    Perennial allergic rhinitis J30.89    Abscess L02.91    UTI (urinary tract infection) N39.0    Complex cyst of right ovary N83.291    Pain in pelvis R10.2    Endometriosis N80.9    Pelvic fluid collection R18.8       PLAN:    The operative procedures and clinical results have been reviewed with the patient. Implications of diagnosis discussed at length. All questions answered. I will see her back in 3 months to reevaluate. I started her on daily Provera to suppress the endometriosis. We can consider stopping that after 6 months to a year. We may add back some estrogen at some point. The patient is advised to call our office with any problems or concerns.     Basim Thomas MD  10/15/2020/1:20 PM

## 2021-01-19 ENCOUNTER — OFFICE VISIT (OUTPATIENT)
Dept: GYNECOLOGY | Age: 36
End: 2021-01-19
Payer: MEDICAID

## 2021-01-19 VITALS
HEIGHT: 68 IN | DIASTOLIC BLOOD PRESSURE: 77 MMHG | SYSTOLIC BLOOD PRESSURE: 115 MMHG | WEIGHT: 132 LBS | BODY MASS INDEX: 20 KG/M2

## 2021-01-19 DIAGNOSIS — N80.9 ENDOMETRIOSIS: Primary | ICD-10-CM

## 2021-01-19 PROCEDURE — 99214 OFFICE O/P EST MOD 30 MIN: CPT | Performed by: OBSTETRICS & GYNECOLOGY

## 2021-01-19 RX ORDER — MAGNESIUM CITRATE
296 SOLUTION, ORAL ORAL
COMMUNITY
End: 2021-08-21

## 2021-01-19 RX ORDER — MAG HYDROX/ALUMINUM HYD/SIMETH 200-200-20
30 SUSPENSION, ORAL (FINAL DOSE FORM) ORAL
COMMUNITY

## 2021-01-19 NOTE — PROGRESS NOTES
Three month check up for history of endometrial cancer. Patient c/o constipation,  pain in lower back and hips.

## 2021-01-19 NOTE — PROGRESS NOTES
[address] [facility] [address], [address]  P [phone]  F [phone]    Office Note  Patient ID:  Name:  Celeste Milligan  MRN:  [id number]  :  1985/35 y.o. Date:  2021      HISTORY OF PRESENT ILLNESS:  Celeste Milligan is a 38 y.o.  female who is an established patient with a diagnosis of severe pelvic endometriosis. I took her to the OR on 20 for laparoscopic resection of mass, total laparoscopic hysterectomy, bilateral salpingooophorectomy, appendectomy, cystoscopy. She had extensive pelvic endometriosis and a large right adnexal mass that was densely adherent to the pelvic sidewall and posterior uterus. She subsequently presented to the ER with fever and abdominal pain. A CT revealed a vaginal cuff abscess. She was started on antibiotics and admitted for observation. The abscess drained spontaneously vaginally and she did not require surgical intervention. I subsequently started her on daily Provera to suppress her pelvic endometriosis, as it was not completely resected, with the plan being to consider discontinuation after 6 months. She presents today for follow-up. Her only complaint is constipation. ROS:   and GI review:  Negative  Cardiopulmonary review:  Negative   Musculoskeletal:  Negative    A comprehensive review of systems was negative except for that written in the History of Present Illness. , 10 point ROS      OB/GYN ROS/HX:  [id number]  Vaginal delivery x 4  Hx of ectopic pregnancy  Hx of BTL  Hx of TLH, BSO for pelvic endometriosis      Problem List:  Patient Active Problem List    Diagnosis Date Noted    Pelvic fluid collection 2020    Endometriosis 2020    UTI (urinary tract infection) 2020    Complex cyst of right ovary 2019    Pain in pelvis 2019    Abscess 2016    History of depressed bipolar disorder [facility] 2016    H/O schizophrenia 2016  Myalgia 11/23/2016    Perennial allergic rhinitis 11/23/2016    Iron deficiency 09/30/2015    Elevated cancer antigen 125 (CA-125) 09/27/2015    Nausea and vomiting 01/24/2013    Anemia 01/10/2013    Sickle cell trait ([address] 11/30/2012    Cyst of ovary 10/24/2012     PMH:  Past Medical History:   Diagnosis Date    Abscess 12/1/2016    Anemia NEC     Bipolar 1 disorder (HCC)     Breast tenderness     bilateral, since 11/2016, on and off     Cancer [facility]) 2008    CERVICAL    Depression     Dizziness     Gastrointestinal disorder     pancreatitis    GERD (gastroesophageal reflux disease)     Increased frequency of headaches     Kidney stones 10/2012    Nausea     Nipple discharge 2009    white/clear discharge since birth of child     Other ill-defined conditions(799.89)     sickle cell trait    Other ill-defined conditions(799.89)     anemia    Psychiatric disorder     depression, bi-polar    Psychotic disorder ([address]     bipolar 1    Sickle cell trait ([address]     Sickle cell trait ([address]     Stomach pain       PSH:  Past Surgical History:   Procedure Laterality Date    HX GYN      tubaligation    HX GYN      ectopic pregnancy surgery    HX OTHER SURGICAL      hx of blood transfusions      Social History:  Social History     Tobacco Use    Smoking status: Current Every Day Smoker     Packs/day: 0.25     Years: 9.00     Pack years: 2.25    Smokeless tobacco: Never Used   Substance Use Topics    Alcohol use: No      Family History:  Family History   Problem Relation Age of Onset    Heart Disease Father     Anesth Problems Neg Hx       Medications: (reviewed)  Current Outpatient Medications   Medication Sig    alum-mag hydroxide-simeth (Maalox Advanced) 200-200-20 mg/5 mL susp Take 30 mL by mouth every four (4) hours as needed for Indigestion.  magnesium citrate solution Take 296 mL by mouth now.  medroxyPROGESTERone (PROVERA) 5 mg tablet Take 1 Tab by mouth daily.     ibuprofen (MOTRIN) 800 mg tablet Take 1 Tab by mouth every eight (8) hours as needed for Pain.  docusate sodium (COLACE) 100 mg capsule Take 1 Cap by mouth two (2) times a day. Stop for loose stools     No current facility-administered medications for this visit. Allergies: (reviewed)  Allergies   Allergen Reactions    Latex Itching    Cetirizine Vertigo     Other reaction(s): Bradycardia    Tramadol Vertigo    Naproxen Vertigo          OBJECTIVE:    Physical Exam:  VITAL SIGNS: Vitals:    01/19/21 1455   BP: 115/77   Weight: 132 lb (59.9 kg)   Height: 5' 7.52\" (1.715 m)     Body mass index is 20.36 kg/m². GENERAL GARETH: Conversant, alert, oriented. No acute distress. HEENT: HEENT. No thyroid enlargement. No JVD. Neck: Supple without restrictions. RESPIRATORY: Clear to auscultation and percussion to the bases. No CVAT. CARDIOVASC: RRR without murmur/rub. GASTROINT: soft, non-tender, without masses or organomegaly   MUSCULOSKEL: no joint tenderness, deformity or swelling   EXTREMITIES: extremities normal, atraumatic, no cyanosis or edema   PELVIC: External genitalia: normal general appearance  Urinary system: urethral meatus normal  Vaginal: normal without tenderness, induration or masses  Cervix: absent  Adnexa: removed surgically  Uterus: absent   RECTAL: Deferred   DON SURVEY: No suspicious lymphadenopathy or edema noted. NEURO: Grossly intact. No acute deficit.        Lab Data:    Lab Results   Component Value Date/Time    WBC 5.9 09/05/2020 06:12 AM    HGB 6.4 (L) 09/05/2020 06:12 AM    HCT 19.2 (L) 09/05/2020 06:12 AM    PLATELET 951 44/63/3104 06:12 AM    MCV 79.3 (L) 09/05/2020 06:12 AM     Lab Results   Component Value Date/Time    Sodium 134 (L) 09/02/2020 04:17 PM    Potassium 3.4 (L) 09/02/2020 04:17 PM    Chloride 99 09/02/2020 04:17 PM    CO2 26 09/02/2020 04:17 PM    Anion gap 9 09/02/2020 04:17 PM    Glucose 101 (H) 09/02/2020 04:17 PM    BUN 8 09/02/2020 04:17 PM    Creatinine 1.00 09/02/2020 04:17 PM    BUN/Creatinine ratio 8 (L) 09/02/2020 04:17 PM    GFR est AA >60 09/02/2020 04:17 PM    GFR est non-AA >60 09/02/2020 04:17 PM    Calcium 9.4 09/02/2020 04:17 PM         IMPRESSION/PLAN:  Mary Nascimento is a 38 y.o. female with a diagnosis of pelvic endometriosis, s/p TLH, BSO. Her postoperative course was complicated by a vaginal cuff abscess. She has been on Provera since her surgery to suppress any remaining endometriosis. I recommended that we stop the progesterone at this time, as 6 months should be adequate therapy post-oophorectomy. Stopping the progesterone might also help with her constipation, as that is a known side effect. I suggested she call back if she has not seen any improvement in a few weeks. She agrees with that plan. An electronic signature was used to sign this note.     Signed By: Constantine Manriquez MD     1/19/2021

## 2021-01-29 ENCOUNTER — HOSPITAL ENCOUNTER (EMERGENCY)
Age: 36
Discharge: HOME | End: 2021-01-29
Attending: EMERGENCY MEDICINE | Admitting: EMERGENCY MEDICINE
Payer: MEDICAID

## 2021-01-29 ENCOUNTER — APPOINTMENT (OUTPATIENT)
Dept: CT IMAGING | Age: 36
End: 2021-01-29
Attending: EMERGENCY MEDICINE
Payer: MEDICAID

## 2021-01-29 VITALS
DIASTOLIC BLOOD PRESSURE: 73 MMHG | RESPIRATION RATE: 20 BRPM | SYSTOLIC BLOOD PRESSURE: 123 MMHG | HEART RATE: 82 BPM | OXYGEN SATURATION: 93 % | TEMPERATURE: 99.5 F

## 2021-01-29 DIAGNOSIS — R10.2 ABDOMINAL PAIN, SUPRAPUBIC: Primary | ICD-10-CM

## 2021-01-29 DIAGNOSIS — N30.00 ACUTE CYSTITIS WITHOUT HEMATURIA: ICD-10-CM

## 2021-01-29 LAB
ALBUMIN SERPL-MCNC: 4.1 G/DL (ref 3.5–5)
ALBUMIN/GLOB SERPL: 1.1 {RATIO} (ref 1.1–2.2)
ALP SERPL-CCNC: 78 U/L (ref 45–117)
ALT SERPL-CCNC: 16 U/L (ref 12–78)
ANION GAP SERPL CALC-SCNC: 3 MMOL/L (ref 5–15)
APPEARANCE UR: CLEAR
AST SERPL-CCNC: 9 U/L (ref 15–37)
BACTERIA URNS QL MICRO: ABNORMAL /HPF
BASOPHILS # BLD: 0.1 K/UL (ref 0–0.1)
BASOPHILS NFR BLD: 1 % (ref 0–1)
BILIRUB SERPL-MCNC: 0.5 MG/DL (ref 0.2–1)
BILIRUB UR QL: NEGATIVE
BUN SERPL-MCNC: 8 MG/DL (ref 6–20)
BUN/CREAT SERPL: 9 (ref 12–20)
CALCIUM SERPL-MCNC: 9.2 MG/DL (ref 8.5–10.1)
CHLORIDE SERPL-SCNC: 108 MMOL/L (ref 97–108)
CO2 SERPL-SCNC: 30 MMOL/L (ref 21–32)
COLOR UR: ABNORMAL
COMMENT, HOLDF: NORMAL
CREAT SERPL-MCNC: 0.88 MG/DL (ref 0.55–1.02)
DIFFERENTIAL METHOD BLD: ABNORMAL
EOSINOPHIL # BLD: 0.8 K/UL (ref 0–0.4)
EOSINOPHIL NFR BLD: 11 % (ref 0–7)
EPITH CASTS URNS QL MICRO: ABNORMAL /LPF
ERYTHROCYTE [DISTWIDTH] IN BLOOD BY AUTOMATED COUNT: 21.1 % (ref 11.5–14.5)
GLOBULIN SER CALC-MCNC: 3.8 G/DL (ref 2–4)
GLUCOSE SERPL-MCNC: 114 MG/DL (ref 65–100)
GLUCOSE UR STRIP.AUTO-MCNC: NEGATIVE MG/DL
HCT VFR BLD AUTO: 36.2 % (ref 35–47)
HGB BLD-MCNC: 12.2 G/DL (ref 11.5–16)
HGB UR QL STRIP: ABNORMAL
HYALINE CASTS URNS QL MICRO: ABNORMAL /LPF (ref 0–5)
IMM GRANULOCYTES # BLD AUTO: 0 K/UL (ref 0–0.04)
IMM GRANULOCYTES NFR BLD AUTO: 0 % (ref 0–0.5)
KETONES UR QL STRIP.AUTO: NEGATIVE MG/DL
LEUKOCYTE ESTERASE UR QL STRIP.AUTO: ABNORMAL
LIPASE SERPL-CCNC: 179 U/L (ref 73–393)
LYMPHOCYTES # BLD: 2.8 K/UL (ref 0.8–3.5)
LYMPHOCYTES NFR BLD: 39 % (ref 12–49)
MCH RBC QN AUTO: 25.8 PG (ref 26–34)
MCHC RBC AUTO-ENTMCNC: 33.7 G/DL (ref 30–36.5)
MCV RBC AUTO: 76.7 FL (ref 80–99)
MONOCYTES # BLD: 0.4 K/UL (ref 0–1)
MONOCYTES NFR BLD: 6 % (ref 5–13)
NEUTS SEG # BLD: 3.1 K/UL (ref 1.8–8)
NEUTS SEG NFR BLD: 43 % (ref 32–75)
NITRITE UR QL STRIP.AUTO: NEGATIVE
NRBC # BLD: 0 K/UL (ref 0–0.01)
NRBC BLD-RTO: 0 PER 100 WBC
PH UR STRIP: 5.5 [PH] (ref 5–8)
PLATELET # BLD AUTO: 230 K/UL (ref 150–400)
POTASSIUM SERPL-SCNC: 3.1 MMOL/L (ref 3.5–5.1)
PROT SERPL-MCNC: 7.9 G/DL (ref 6.4–8.2)
PROT UR STRIP-MCNC: ABNORMAL MG/DL
RBC # BLD AUTO: 4.72 M/UL (ref 3.8–5.2)
RBC #/AREA URNS HPF: ABNORMAL /HPF (ref 0–5)
RBC MORPH BLD: ABNORMAL
RBC MORPH BLD: ABNORMAL
SAMPLES BEING HELD,HOLD: NORMAL
SODIUM SERPL-SCNC: 141 MMOL/L (ref 136–145)
SP GR UR REFRACTOMETRY: 1.01 (ref 1–1.03)
UR CULT HOLD, URHOLD: NORMAL
UROBILINOGEN UR QL STRIP.AUTO: 0.2 EU/DL (ref 0.2–1)
WBC # BLD AUTO: 7.2 K/UL (ref 3.6–11)
WBC URNS QL MICRO: ABNORMAL /HPF (ref 0–4)

## 2021-01-29 PROCEDURE — 74011250636 HC RX REV CODE- 250/636: Performed by: EMERGENCY MEDICINE

## 2021-01-29 PROCEDURE — 74011250637 HC RX REV CODE- 250/637: Performed by: EMERGENCY MEDICINE

## 2021-01-29 PROCEDURE — 99284 EMERGENCY DEPT VISIT MOD MDM: CPT

## 2021-01-29 PROCEDURE — 36415 COLL VENOUS BLD VENIPUNCTURE: CPT

## 2021-01-29 PROCEDURE — 87086 URINE CULTURE/COLONY COUNT: CPT

## 2021-01-29 PROCEDURE — 74176 CT ABD & PELVIS W/O CONTRAST: CPT

## 2021-01-29 PROCEDURE — 96375 TX/PRO/DX INJ NEW DRUG ADDON: CPT

## 2021-01-29 PROCEDURE — 83690 ASSAY OF LIPASE: CPT

## 2021-01-29 PROCEDURE — C9113 INJ PANTOPRAZOLE SODIUM, VIA: HCPCS | Performed by: EMERGENCY MEDICINE

## 2021-01-29 PROCEDURE — 81001 URINALYSIS AUTO W/SCOPE: CPT

## 2021-01-29 PROCEDURE — 85025 COMPLETE CBC W/AUTO DIFF WBC: CPT

## 2021-01-29 PROCEDURE — 96374 THER/PROPH/DIAG INJ IV PUSH: CPT

## 2021-01-29 PROCEDURE — 74011000250 HC RX REV CODE- 250: Performed by: EMERGENCY MEDICINE

## 2021-01-29 PROCEDURE — 80053 COMPREHEN METABOLIC PANEL: CPT

## 2021-01-29 RX ORDER — ONDANSETRON 2 MG/ML
4 INJECTION INTRAMUSCULAR; INTRAVENOUS
Status: COMPLETED | OUTPATIENT
Start: 2021-01-29 | End: 2021-01-29

## 2021-01-29 RX ORDER — ONDANSETRON 4 MG/1
4 TABLET, ORALLY DISINTEGRATING ORAL
Qty: 20 TAB | Refills: 0 | Status: SHIPPED | OUTPATIENT
Start: 2021-01-29 | End: 2021-01-29 | Stop reason: SDUPTHER

## 2021-01-29 RX ORDER — CEPHALEXIN 500 MG/1
500 CAPSULE ORAL
Status: COMPLETED | OUTPATIENT
Start: 2021-01-29 | End: 2021-01-29

## 2021-01-29 RX ORDER — PHENAZOPYRIDINE HYDROCHLORIDE 200 MG/1
200 TABLET, FILM COATED ORAL 3 TIMES DAILY
Qty: 6 TAB | Refills: 0 | Status: SHIPPED | OUTPATIENT
Start: 2021-01-29 | End: 2021-01-31

## 2021-01-29 RX ORDER — ONDANSETRON 4 MG/1
4 TABLET, ORALLY DISINTEGRATING ORAL
Qty: 10 TAB | Refills: 0 | OUTPATIENT
Start: 2021-01-29 | End: 2021-06-20

## 2021-01-29 RX ORDER — KETOROLAC TROMETHAMINE 30 MG/ML
30 INJECTION, SOLUTION INTRAMUSCULAR; INTRAVENOUS
Status: COMPLETED | OUTPATIENT
Start: 2021-01-29 | End: 2021-01-29

## 2021-01-29 RX ORDER — CEPHALEXIN 250 MG/1
250 CAPSULE ORAL 2 TIMES DAILY
Qty: 14 CAP | Refills: 0 | Status: SHIPPED | OUTPATIENT
Start: 2021-01-29 | End: 2021-02-05

## 2021-01-29 RX ORDER — PHENAZOPYRIDINE HYDROCHLORIDE 100 MG/1
200 TABLET, FILM COATED ORAL
Status: COMPLETED | OUTPATIENT
Start: 2021-01-29 | End: 2021-01-29

## 2021-01-29 RX ADMIN — PHENAZOPYRIDINE HYDROCHLORIDE 200 MG: 100 TABLET ORAL at 06:46

## 2021-01-29 RX ADMIN — ONDANSETRON 4 MG: 2 INJECTION INTRAMUSCULAR; INTRAVENOUS at 03:26

## 2021-01-29 RX ADMIN — CEPHALEXIN 500 MG: 500 CAPSULE ORAL at 06:46

## 2021-01-29 RX ADMIN — PANTOPRAZOLE SODIUM 40 MG: 40 INJECTION, POWDER, FOR SOLUTION INTRAVENOUS at 03:26

## 2021-01-29 RX ADMIN — KETOROLAC TROMETHAMINE 30 MG: 30 INJECTION, SOLUTION INTRAMUSCULAR at 03:26

## 2021-01-29 NOTE — ED NOTES
Pt given written and verbal discharge instructions, 3 efiled Rx; pt verbalized understanding of such. VSS at time of discharge. Belongings in pt possession at time of discharge. Pt ambulatory out of ED without difficulty in NAD. No complaints, needs, or questions at this time. Pt to call PCP ASAP for follow-up    Set up a ride through [facility] ([phone]) reservation # [phone]. Representative states she spoke with yellow cab and that she will be waiting 2 hours.

## 2021-01-29 NOTE — DISCHARGE INSTRUCTIONS
Work up in the emergency room including blood work, urinalysis and cat scan of the abdomen/pelvis was reassuring except possibly a urinary tract infection. I have sent antibiotics and pain medication for the bladder to the pharmacy, as well a nausea medication. It is safe to also take Tylenol for the pain. Follow up with your primary care doctor in 2 days if no improvement.  Return to the ED if the pain is getting worse despite the medication, fever, recurrent vomiting, etc.

## 2021-01-29 NOTE — ED NOTES
This nurse at bedside to medicate, pt on the phone, vomiting has subsided. Pt states she believes her vomiting and pain is r/t the hamburger she ate. Pt aware of urine sample order, requested that she use the call bell when she's ready to walk to the restroom.

## 2021-01-29 NOTE — ED TRIAGE NOTES
She arrives by EMS vomiting. She started to vomit with abdominal pain 30 min prior to EMS arrival. She has hx of hysterectomy with post op abscess. She said she recently stopped taking medication for this.

## 2021-01-29 NOTE — ED PROVIDER NOTES
HPI     19-year-old female with a history of pancreatitis, acid reflux status post hysterectomy in August of last year,  kidney stones, bipolar disorder, presents the emergency department complaining of 10 of 10   suprapubic abdominal pain starting at 2:00 this morning. Patient states she ate hamburger and fries last night and was in her normal state of health. She woke up this morning with severe pain. It does not radiate. She has been urinating normally. She had a bowel movement this morning as well. She has not had a fever. She did not take anything for her symptoms. She has had associated nausea vomiting. Patient states she smokes cigarettes but does not drink alcohol.   Past Medical History:   Diagnosis Date    Abscess 12/1/2016    Anemia NEC     Bipolar 1 disorder ([address]     Breast tenderness     bilateral, since 11/2016, on and off     Cancer [facility]) 2008    CERVICAL    Depression     Dizziness     Gastrointestinal disorder     pancreatitis    GERD (gastroesophageal reflux disease)     Increased frequency of headaches     Kidney stones 10/2012    Nausea     Nipple discharge 2009    white/clear discharge since birth of child     Other ill-defined conditions(799.89)     sickle cell trait    Other ill-defined conditions(799.89)     anemia    Psychiatric disorder     depression, bi-polar    Psychotic disorder ([address]     bipolar 1    Sickle cell trait ([address]     Sickle cell trait ([address]     Stomach pain        Past Surgical History:   Procedure Laterality Date    HX GYN      tubaligation    HX GYN      ectopic pregnancy surgery    HX HYSTERECTOMY      HX OTHER SURGICAL      hx of blood transfusions         Family History:   Problem Relation Age of Onset    Heart Disease Father     Anesth Problems Neg Hx        Social History     Socioeconomic History    Marital status: SINGLE     Spouse name: Not on file    Number of children: Not on file    Years of education: Not on file    Highest education level: Not on file   Occupational History    Not on file   Social Needs    Financial resource strain: Not on file    Food insecurity     Worry: Not on file     Inability: Not on file    Transportation needs     Medical: Not on file     Non-medical: Not on file   Tobacco Use    Smoking status: Current Every Day Smoker     Packs/day: 0.25     Years: 9.00     Pack years: 2.25    Smokeless tobacco: Never Used   Substance and Sexual Activity    Alcohol use: No    Drug use: Not Currently     Comment: occasional    Sexual activity: Yes     Partners: Male     Birth control/protection: Surgical     Comment: Tubal   Lifestyle    Physical activity     Days per week: Not on file     Minutes per session: Not on file    Stress: Not on file   Relationships    Social connections     Talks on phone: Not on file     Gets together: Not on file     Attends Sikhism service: Not on file     Active member of club or organization: Not on file     Attends meetings of clubs or organizations: Not on file     Relationship status: Not on file    Intimate partner violence     Fear of current or ex partner: Not on file     Emotionally abused: Not on file     Physically abused: Not on file     Forced sexual activity: Not on file   Other Topics Concern    Not on file   Social History Narrative    Not on file         ALLERGIES: Latex, Cetirizine, Tramadol, and Naproxen    Review of Systems   Constitutional: Negative for fever. HENT: Negative for congestion and sore throat. Eyes: Negative for visual disturbance. Respiratory: Negative for cough and shortness of breath. Cardiovascular: Negative for chest pain. Gastrointestinal: Positive for abdominal pain, nausea and vomiting. Negative for constipation and diarrhea. Genitourinary: Negative for dysuria. Musculoskeletal: Negative for gait problem. Skin: Negative for rash. Neurological: Positive for headaches.    Psychiatric/Behavioral: Negative for dysphoric mood. Vitals:    01/29/21 0304   BP: (!) 149/96   Pulse: 82   Resp: 20   Temp: 99.5 °F (37.5 °C)   SpO2: 100%            Physical Exam  Constitutional:       General: She is not in acute distress. Appearance: She is well-developed. HENT:      Head: Normocephalic and atraumatic. Mouth/Throat:      Pharynx: No oropharyngeal exudate. Eyes:      General: No scleral icterus. Right eye: No discharge. Left eye: No discharge. Pupils: Pupils are equal, round, and reactive to light. Neck:      Musculoskeletal: Normal range of motion and neck supple. Vascular: No JVD. Cardiovascular:      Rate and Rhythm: Normal rate and regular rhythm. Heart sounds: Normal heart sounds. No murmur. Pulmonary:      Effort: Pulmonary effort is normal. No respiratory distress. Breath sounds: Normal breath sounds. No stridor. No wheezing or rales. Chest:      Chest wall: No tenderness. Abdominal:      General: Bowel sounds are normal. There is no distension. Palpations: Abdomen is soft. There is no mass. Tenderness: There is abdominal tenderness in the epigastric area and suprapubic area. There is no guarding or rebound. Musculoskeletal: Normal range of motion. Skin:     General: Skin is warm and dry. Capillary Refill: Capillary refill takes less than 2 seconds. Findings: No rash. Neurological:      Mental Status: She is oriented to person, place, and time. Psychiatric:         Behavior: Behavior normal.         Thought Content: Thought content normal.         Judgment: Judgment normal.          MDM       Procedures      Labs and CT scan were reassuring. Questionable based on urinalysis.   Given no other source of pain and her discomfort in the suprapubic area will start antibiotics, Pyridium and with close follow-up with her primary care doctor patient was instructed to return should she develop fever worsening pain persistent vomiting etc.

## 2021-01-30 LAB
BACTERIA SPEC CULT: NORMAL
CC UR VC: NORMAL
SERVICE CMNT-IMP: NORMAL

## 2021-02-19 ENCOUNTER — HOSPITAL ENCOUNTER (EMERGENCY)
Age: 36
Discharge: HOME | End: 2021-02-19
Attending: EMERGENCY MEDICINE | Admitting: EMERGENCY MEDICINE
Payer: MEDICAID

## 2021-02-19 VITALS
RESPIRATION RATE: 15 BRPM | OXYGEN SATURATION: 100 % | SYSTOLIC BLOOD PRESSURE: 114 MMHG | HEART RATE: 77 BPM | TEMPERATURE: 98.6 F | DIASTOLIC BLOOD PRESSURE: 73 MMHG

## 2021-02-19 DIAGNOSIS — N30.00 ACUTE CYSTITIS WITHOUT HEMATURIA: Primary | ICD-10-CM

## 2021-02-19 LAB
APPEARANCE UR: ABNORMAL
BACTERIA URNS QL MICRO: NEGATIVE /HPF
BILIRUB UR QL: NEGATIVE
COLOR UR: ABNORMAL
EPITH CASTS URNS QL MICRO: ABNORMAL /LPF
GLUCOSE UR STRIP.AUTO-MCNC: NEGATIVE MG/DL
HGB UR QL STRIP: ABNORMAL
HYALINE CASTS URNS QL MICRO: ABNORMAL /LPF (ref 0–5)
KETONES UR QL STRIP.AUTO: NEGATIVE MG/DL
LEUKOCYTE ESTERASE UR QL STRIP.AUTO: ABNORMAL
NITRITE UR QL STRIP.AUTO: NEGATIVE
PH UR STRIP: 5.5 [PH] (ref 5–8)
PROT UR STRIP-MCNC: 30 MG/DL
RBC #/AREA URNS HPF: ABNORMAL /HPF (ref 0–5)
SP GR UR REFRACTOMETRY: 1.01 (ref 1–1.03)
UR CULT HOLD, URHOLD: NORMAL
UROBILINOGEN UR QL STRIP.AUTO: 1 EU/DL (ref 0.2–1)
WBC URNS QL MICRO: >100 /HPF (ref 0–4)

## 2021-02-19 PROCEDURE — 81001 URINALYSIS AUTO W/SCOPE: CPT

## 2021-02-19 PROCEDURE — 99284 EMERGENCY DEPT VISIT MOD MDM: CPT

## 2021-02-19 PROCEDURE — 87086 URINE CULTURE/COLONY COUNT: CPT

## 2021-02-19 RX ORDER — CIPROFLOXACIN 250 MG/1
250 TABLET, FILM COATED ORAL DAILY
Qty: 5 TAB | Refills: 0 | Status: SHIPPED | OUTPATIENT
Start: 2021-02-19 | End: 2021-02-24

## 2021-02-19 NOTE — ED PROVIDER NOTES
71-year-old female with past medical history of anemia, bipolar disorder, cervical cancer, pancreatitis, GERD, kidney stones, sickle cell trait presents to ED due to dysuria which onset last night. Patient states that it feels like a burning sharp pain when she pees. Denies seeing any blood in her urine or having any foul odor. States she was treated for urinary tract infection on 1/29/2021 and in it seems that her symptoms got better but have suddenly come back. Denies any fever, chills, nausea, vomiting, or back pain.            Past Medical History:   Diagnosis Date    Abscess 12/1/2016    Anemia NEC     Bipolar 1 disorder ([address]     Breast tenderness     bilateral, since 11/2016, on and off     Cancer [facility]) 2008    CERVICAL    Depression     Dizziness     Gastrointestinal disorder     pancreatitis    GERD (gastroesophageal reflux disease)     Increased frequency of headaches     Kidney stones 10/2012    Nausea     Nipple discharge 2009    white/clear discharge since birth of child     Other ill-defined conditions(799.89)     sickle cell trait    Other ill-defined conditions(799.89)     anemia    Psychiatric disorder     depression, bi-polar    Psychotic disorder ([address]     bipolar 1    Sickle cell trait ([address]     Sickle cell trait ([address]     Stomach pain        Past Surgical History:   Procedure Laterality Date    HX GYN      tubaligation    HX GYN      ectopic pregnancy surgery    HX HYSTERECTOMY      HX OTHER SURGICAL      hx of blood transfusions         Family History:   Problem Relation Age of Onset    Heart Disease Father     Anesth Problems Neg Hx        Social History     Socioeconomic History    Marital status: SINGLE     Spouse name: Not on file    Number of children: Not on file    Years of education: Not on file    Highest education level: Not on file   Occupational History    Not on file   Social Needs    Financial resource strain: Not on file    Food insecurity Worry: Not on file     Inability: Not on file    Transportation needs     Medical: Not on file     Non-medical: Not on file   Tobacco Use    Smoking status: Current Every Day Smoker     Packs/day: 0.25     Years: 9.00     Pack years: 2.25    Smokeless tobacco: Never Used   Substance and Sexual Activity    Alcohol use: No    Drug use: Not Currently     Comment: occasional    Sexual activity: Yes     Partners: Male     Birth control/protection: Surgical     Comment: Tubal   Lifestyle    Physical activity     Days per week: Not on file     Minutes per session: Not on file    Stress: Not on file   Relationships    Social connections     Talks on phone: Not on file     Gets together: Not on file     Attends Ca[employer] service: Not on file     Active member of club or organization: Not on file     Attends meetings of clubs or organizations: Not on file     Relationship status: Not on file    Intimate partner violence     Fear of current or ex partner: Not on file     Emotionally abused: Not on file     Physically abused: Not on file     Forced sexual activity: Not on file   Other Topics Concern    Not on file   Social History Narrative    Not on file         ALLERGIES: Latex, Cetirizine, Tramadol, and Naproxen    Review of Systems   Constitutional: Negative for fever. HENT: Negative for congestion and sore throat. Respiratory: Negative for cough and shortness of breath. Cardiovascular: Negative for chest pain. Gastrointestinal: Negative for nausea and vomiting. Genitourinary: Positive for dysuria and urgency. Negative for flank pain, vaginal bleeding and vaginal discharge. Musculoskeletal: Negative for myalgias. Skin: Negative for rash. Neurological: Negative for dizziness and weakness. Vitals:    02/19/21 1137   BP: 116/79   Pulse: 84   Resp: 16   Temp: 97.6 °F (36.4 °C)   SpO2: 99%            Physical Exam  Vitals signs and nursing note reviewed.    Constitutional:       General: She is not in acute distress. HENT:      Head: Normocephalic. Nose: Nose normal.      Mouth/Throat:      Mouth: Mucous membranes are moist.   Eyes:      Extraocular Movements: Extraocular movements intact. Neck:      Musculoskeletal: Normal range of motion. Pulmonary:      Effort: Pulmonary effort is normal. No respiratory distress. Abdominal:      General: Abdomen is flat. Tenderness: There is no abdominal tenderness. There is no right CVA tenderness or left CVA tenderness. Skin:     General: Skin is dry. Findings: No rash. Neurological:      Mental Status: She is alert and oriented to person, place, and time. Psychiatric:         Mood and Affect: Mood normal.       Medications - No data to display  Labs Reviewed   URINALYSIS W/MICROSCOPIC - Abnormal; Notable for the following components:       Result Value    Appearance CLOUDY (*)     Protein 30 (*)     Blood SMALL (*)     Leukocyte Esterase LARGE (*)     WBC >100 (*)     Epithelial cells MODERATE (*)     All other components within normal limits   URINE CULTURE HOLD SAMPLE   CULTURE, URINE     No orders to display        MDM  Number of Diagnoses or Management Options  Acute cystitis without hematuria  Diagnosis management comments: Differential diagnosis includes acute cystitis, pyelonephritis, kidney stone, and others    Urinalysis reveals significant urinary tract infection. Unsure if this is recurrent infection or worsening of same infection which may have failed prior treatment. Patient was treated with Keflex previously. Will treat with ciprofloxacin. Urine culture ordered. Previous urine culture grew various sade and was not helpful in antibiotic decision today. Return precautions reviewed. Discussed appropriate follow-up with primary care provider.        Amount and/or Complexity of Data Reviewed  Clinical lab tests: reviewed           Procedures    Praveena Woo PA-C  2/19/2021

## 2021-02-19 NOTE — ED TRIAGE NOTES
Triage: Pt arrives from home via EMS with CC of stinging when she urinates. She endorses urinating more frequently. Denies vaginal bleeding or discharge. Denies flank pain.

## 2021-02-20 LAB
BACTERIA SPEC CULT: NORMAL
SERVICE CMNT-IMP: NORMAL

## 2021-06-20 ENCOUNTER — HOSPITAL ENCOUNTER (EMERGENCY)
Age: 36
Discharge: HOME | End: 2021-06-20
Attending: EMERGENCY MEDICINE
Payer: MEDICAID

## 2021-06-20 ENCOUNTER — APPOINTMENT (OUTPATIENT)
Dept: CT IMAGING | Age: 36
End: 2021-06-20
Attending: PHYSICIAN ASSISTANT
Payer: MEDICAID

## 2021-06-20 VITALS
HEIGHT: 64 IN | WEIGHT: 140 LBS | RESPIRATION RATE: 18 BRPM | HEART RATE: 88 BPM | DIASTOLIC BLOOD PRESSURE: 77 MMHG | BODY MASS INDEX: 23.9 KG/M2 | OXYGEN SATURATION: 98 % | TEMPERATURE: 98.1 F | SYSTOLIC BLOOD PRESSURE: 119 MMHG

## 2021-06-20 DIAGNOSIS — R10.31 ABDOMINAL PAIN, RIGHT LOWER QUADRANT: ICD-10-CM

## 2021-06-20 DIAGNOSIS — R91.8 LUNG NODULES: ICD-10-CM

## 2021-06-20 DIAGNOSIS — N30.01 ACUTE CYSTITIS WITH HEMATURIA: Primary | ICD-10-CM

## 2021-06-20 DIAGNOSIS — R11.2 NON-INTRACTABLE VOMITING WITH NAUSEA, UNSPECIFIED VOMITING TYPE: ICD-10-CM

## 2021-06-20 DIAGNOSIS — N13.30 HYDRONEPHROSIS, UNSPECIFIED HYDRONEPHROSIS TYPE: ICD-10-CM

## 2021-06-20 LAB
ALBUMIN SERPL-MCNC: 4 G/DL (ref 3.5–5)
ALBUMIN/GLOB SERPL: 1 {RATIO} (ref 1.1–2.2)
ALP SERPL-CCNC: 77 U/L (ref 45–117)
ALT SERPL-CCNC: 17 U/L (ref 12–78)
AMPHET UR QL SCN: NEGATIVE
ANION GAP SERPL CALC-SCNC: 10 MMOL/L (ref 5–15)
APPEARANCE UR: ABNORMAL
AST SERPL-CCNC: 20 U/L (ref 15–37)
BACTERIA URNS QL MICRO: ABNORMAL /HPF
BARBITURATES UR QL SCN: NEGATIVE
BASOPHILS # BLD: 0 K/UL (ref 0–0.1)
BASOPHILS NFR BLD: 0 % (ref 0–1)
BENZODIAZ UR QL: NEGATIVE
BILIRUB SERPL-MCNC: 0.8 MG/DL (ref 0.2–1)
BILIRUB UR QL: NEGATIVE
BUN SERPL-MCNC: 15 MG/DL (ref 6–20)
BUN/CREAT SERPL: 16 (ref 12–20)
CALCIUM SERPL-MCNC: 9.1 MG/DL (ref 8.5–10.1)
CANNABINOIDS UR QL SCN: POSITIVE
CHLORIDE SERPL-SCNC: 107 MMOL/L (ref 97–108)
CO2 SERPL-SCNC: 30 MMOL/L (ref 21–32)
COCAINE UR QL SCN: NEGATIVE
COLOR UR: ABNORMAL
CREAT SERPL-MCNC: 0.91 MG/DL (ref 0.55–1.02)
DIFFERENTIAL METHOD BLD: ABNORMAL
DRUG SCRN COMMENT,DRGCM: ABNORMAL
EOSINOPHIL # BLD: 0.2 K/UL (ref 0–0.4)
EOSINOPHIL NFR BLD: 2 % (ref 0–7)
EPITH CASTS URNS QL MICRO: ABNORMAL /LPF
ERYTHROCYTE [DISTWIDTH] IN BLOOD BY AUTOMATED COUNT: 14.2 % (ref 11.5–14.5)
ETHANOL SERPL-MCNC: <10 MG/DL
GLOBULIN SER CALC-MCNC: 3.9 G/DL (ref 2–4)
GLUCOSE SERPL-MCNC: 91 MG/DL (ref 65–100)
GLUCOSE UR STRIP.AUTO-MCNC: NEGATIVE MG/DL
HCT VFR BLD AUTO: 36.7 % (ref 35–47)
HGB BLD-MCNC: 12.3 G/DL (ref 11.5–16)
HGB UR QL STRIP: ABNORMAL
IMM GRANULOCYTES # BLD AUTO: 0 K/UL (ref 0–0.04)
IMM GRANULOCYTES NFR BLD AUTO: 0 % (ref 0–0.5)
KETONES UR QL STRIP.AUTO: 15 MG/DL
LACTATE SERPL-SCNC: 1.2 MMOL/L (ref 0.4–2)
LEUKOCYTE ESTERASE UR QL STRIP.AUTO: ABNORMAL
LIPASE SERPL-CCNC: 68 U/L (ref 73–393)
LYMPHOCYTES # BLD: 0.9 K/UL (ref 0.8–3.5)
LYMPHOCYTES NFR BLD: 10 % (ref 12–49)
MCH RBC QN AUTO: 29.4 PG (ref 26–34)
MCHC RBC AUTO-ENTMCNC: 33.5 G/DL (ref 30–36.5)
MCV RBC AUTO: 87.8 FL (ref 80–99)
METHADONE UR QL: NEGATIVE
MONOCYTES # BLD: 0.5 K/UL (ref 0–1)
MONOCYTES NFR BLD: 6 % (ref 5–13)
NEUTS SEG # BLD: 7.3 K/UL (ref 1.8–8)
NEUTS SEG NFR BLD: 82 % (ref 32–75)
NITRITE UR QL STRIP.AUTO: POSITIVE
NRBC # BLD: 0 K/UL (ref 0–0.01)
NRBC BLD-RTO: 0 PER 100 WBC
OPIATES UR QL: NEGATIVE
PCP UR QL: NEGATIVE
PH UR STRIP: 6 [PH] (ref 5–8)
PLATELET # BLD AUTO: 216 K/UL (ref 150–400)
PMV BLD AUTO: 12.3 FL (ref 8.9–12.9)
POTASSIUM SERPL-SCNC: 4 MMOL/L (ref 3.5–5.1)
PROT SERPL-MCNC: 7.9 G/DL (ref 6.4–8.2)
PROT UR STRIP-MCNC: >300 MG/DL
RBC # BLD AUTO: 4.18 M/UL (ref 3.8–5.2)
RBC #/AREA URNS HPF: ABNORMAL /HPF (ref 0–5)
SODIUM SERPL-SCNC: 147 MMOL/L (ref 136–145)
SP GR UR REFRACTOMETRY: 1.01 (ref 1–1.03)
UA: UC IF INDICATED,UAUC: ABNORMAL
UROBILINOGEN UR QL STRIP.AUTO: 1 EU/DL (ref 0.2–1)
WBC # BLD AUTO: 8.9 K/UL (ref 3.6–11)
WBC URNS QL MICRO: ABNORMAL /HPF (ref 0–4)

## 2021-06-20 PROCEDURE — 83690 ASSAY OF LIPASE: CPT

## 2021-06-20 PROCEDURE — 74177 CT ABD & PELVIS W/CONTRAST: CPT

## 2021-06-20 PROCEDURE — 83605 ASSAY OF LACTIC ACID: CPT

## 2021-06-20 PROCEDURE — 81001 URINALYSIS AUTO W/SCOPE: CPT

## 2021-06-20 PROCEDURE — 80053 COMPREHEN METABOLIC PANEL: CPT

## 2021-06-20 PROCEDURE — 87086 URINE CULTURE/COLONY COUNT: CPT

## 2021-06-20 PROCEDURE — 99283 EMERGENCY DEPT VISIT LOW MDM: CPT

## 2021-06-20 PROCEDURE — 36415 COLL VENOUS BLD VENIPUNCTURE: CPT

## 2021-06-20 PROCEDURE — 87040 BLOOD CULTURE FOR BACTERIA: CPT

## 2021-06-20 PROCEDURE — 74011250636 HC RX REV CODE- 250/636: Performed by: PHYSICIAN ASSISTANT

## 2021-06-20 PROCEDURE — 85025 COMPLETE CBC W/AUTO DIFF WBC: CPT

## 2021-06-20 PROCEDURE — 96375 TX/PRO/DX INJ NEW DRUG ADDON: CPT

## 2021-06-20 PROCEDURE — 82077 ASSAY SPEC XCP UR&BREATH IA: CPT

## 2021-06-20 PROCEDURE — 96361 HYDRATE IV INFUSION ADD-ON: CPT

## 2021-06-20 PROCEDURE — 80307 DRUG TEST PRSMV CHEM ANLYZR: CPT

## 2021-06-20 PROCEDURE — 87077 CULTURE AEROBIC IDENTIFY: CPT

## 2021-06-20 PROCEDURE — 74011000636 HC RX REV CODE- 636: Performed by: EMERGENCY MEDICINE

## 2021-06-20 PROCEDURE — 96374 THER/PROPH/DIAG INJ IV PUSH: CPT

## 2021-06-20 PROCEDURE — 74011000250 HC RX REV CODE- 250: Performed by: PHYSICIAN ASSISTANT

## 2021-06-20 PROCEDURE — 87186 SC STD MICRODIL/AGAR DIL: CPT

## 2021-06-20 RX ORDER — CIPROFLOXACIN 500 MG/1
500 TABLET ORAL 2 TIMES DAILY
Qty: 14 TABLET | Refills: 0 | Status: SHIPPED | OUTPATIENT
Start: 2021-06-20 | End: 2021-06-27

## 2021-06-20 RX ORDER — METOCLOPRAMIDE HYDROCHLORIDE 5 MG/ML
10 INJECTION INTRAMUSCULAR; INTRAVENOUS
Status: COMPLETED | OUTPATIENT
Start: 2021-06-20 | End: 2021-06-20

## 2021-06-20 RX ORDER — ONDANSETRON 2 MG/ML
4 INJECTION INTRAMUSCULAR; INTRAVENOUS
Status: COMPLETED | OUTPATIENT
Start: 2021-06-20 | End: 2021-06-20

## 2021-06-20 RX ORDER — ONDANSETRON 4 MG/1
4 TABLET, ORALLY DISINTEGRATING ORAL
Qty: 8 TABLET | Refills: 0 | Status: SHIPPED | OUTPATIENT
Start: 2021-06-20

## 2021-06-20 RX ORDER — KETOROLAC TROMETHAMINE 30 MG/ML
15 INJECTION, SOLUTION INTRAMUSCULAR; INTRAVENOUS
Status: COMPLETED | OUTPATIENT
Start: 2021-06-20 | End: 2021-06-20

## 2021-06-20 RX ADMIN — IOPAMIDOL 100 ML: 755 INJECTION, SOLUTION INTRAVENOUS at 15:17

## 2021-06-20 RX ADMIN — KETOROLAC TROMETHAMINE 15 MG: 30 INJECTION, SOLUTION INTRAMUSCULAR; INTRAVENOUS at 14:01

## 2021-06-20 RX ADMIN — WATER 1 G: 1 INJECTION INTRAMUSCULAR; INTRAVENOUS; SUBCUTANEOUS at 15:29

## 2021-06-20 RX ADMIN — SODIUM CHLORIDE 1000 ML: 9 INJECTION, SOLUTION INTRAVENOUS at 14:01

## 2021-06-20 RX ADMIN — ONDANSETRON 4 MG: 2 INJECTION INTRAMUSCULAR; INTRAVENOUS at 14:01

## 2021-06-20 RX ADMIN — METOCLOPRAMIDE 10 MG: 5 INJECTION, SOLUTION INTRAMUSCULAR; INTRAVENOUS at 14:01

## 2021-06-20 NOTE — DISCHARGE INSTRUCTIONS
It was a pleasure taking care of you in our Emergency Department today. We know that when you come to [address] [place] [address], you are entrusting us with your health, comfort, and safety. Our physicians and nurses honor that trust, and truly appreciate the opportunity to care for you and your loved ones. We also value your feedback. If you receive a survey about your Emergency Department experience today, please fill it out. We care about our patients' feedback, and we listen to what you have to say. Thank you!

## 2021-06-20 NOTE — ED NOTES
Patient presents to ED with c/o abdominal pain with vomiting for 1 day. Additionally states nausea and vomiting starting 2 hours prior to arrival. Patient is alert and oriented x 4 and in no acute distress at this time. Respirations are at a regular rate, depth, and pattern. Patient updated on plan of care and has no questions or concerns at this time. Call bell within reach. Will continue to monitor. Please reference nursing assessment. Emergency Department Nursing Plan of Care       The Nursing Plan of Care is developed from the Nursing assessment and Emergency Department Attending provider initial evaluation. The plan of care may be reviewed in the ED Provider note.     The Plan of Care was developed with the following considerations:   Patient / Family readiness to learn indicated by:verbalized understanding and successful return demonstration  Persons(s) to be included in education: patient  Barriers to Learning/Limitations:No    Signed     [id number] RN    6/20/2021  1:45 PM

## 2021-06-20 NOTE — ED NOTES
Patient states that she feels much better. Patient states that's she has to leave ED because she needs to get to her children. Patient (s) 1 given copy of dc instructions and 0 paper script(s) and 2 electronic scripts. Patient (s)  verbalized understanding of instructions and script (s). Patient given a current medication reconciliation form and verbalized understanding of their medications. Patient (s) verbalized understanding of the importance of discussing medications with  his or her physician or clinic they will be following up with. Patient alert and oriented and in no acute distress.

## 2021-06-20 NOTE — ED PROVIDER NOTES
EMERGENCY DEPARTMENT HISTORY AND PHYSICAL EXAM      Date: 6/20/2021  Patient Name: Eryn Krause    History of Presenting Illness     Chief Complaint   Patient presents with    Abdominal Pain     complains of abdominal pain that began this am: per pt she was at [facility] ED and they were \"mean\" to her    Abdominal Pain     History Provided By: Patient    HPI: Eryn Krause, 38 y.o. female with medical history significant for bipolar 1 disorder, sickle cell trait, anemia, pancreatitis, cervical cancer, status post hysterectomy and tubal ligation, tobacco abuse who presents via self to the ED with cc of acute moderate sharp and aching right lower quadrant abdominal pain X 1 day. Additionally endorses nausea and vomiting starting 2 hours prior to arrival.  Patient endorses that she has been taking Keflex for UTI X 1 week. Last dose 2 days prior to arrival.  Endorse that she tried to take it today, but was unable to due to her nausea and vomiting. Patient additionally endorses drinking alcohol yesterday evening. Endorse that she does smoke marijuana. States that she went to [facility] today, but they were \"mean\" and so she left prior to completion of work-up. Denies take any medicine for symptoms today. Denies known fever, chills, chest pain, shortness of breath, lightheadedness, dizziness, syncope, seizure, hematemesis, green-colored emesis, constipation, diarrhea, dysuria, frequency, urgency, hematuria, vaginal bleeding or discharge. PCP: Ryder Uribe NP    There are no other complaints, changes, or physical findings at this time. No current facility-administered medications on file prior to encounter. Current Outpatient Medications on File Prior to Encounter   Medication Sig Dispense Refill    [DISCONTINUED] ondansetron (Zofran ODT) 4 mg disintegrating tablet 1 Tab by SubLINGual route every eight (8) hours as needed for Nausea or Vomiting.  10 Tab 0    alum-mag hydroxide-simeth (Maalox Advanced) 200-200-20 mg/5 mL susp Take 30 mL by mouth every four (4) hours as needed for Indigestion.  magnesium citrate solution Take 296 mL by mouth now.  medroxyPROGESTERone (PROVERA) 5 mg tablet Take 1 Tab by mouth daily. 30 Tab 11    ibuprofen (MOTRIN) 800 mg tablet Take 1 Tab by mouth every eight (8) hours as needed for Pain. 40 Tab 0    docusate sodium (COLACE) 100 mg capsule Take 1 Cap by mouth two (2) times a day.  Stop for loose stools 60 Cap 0     Past History     Past Medical History:  Past Medical History:   Diagnosis Date    Abscess 12/1/2016    Anemia NEC     Bipolar 1 disorder ([facility] [address]     Breast tenderness     bilateral, since 11/2016, on and off     Cancer [facility] 2008    CERVICAL    Depression     Dizziness     Gastrointestinal disorder     pancreatitis    GERD (gastroesophageal reflux disease)     Increased frequency of headaches     Kidney stones 10/2012    Nausea     Nipple discharge 2009    white/clear discharge since birth of child     Other ill-defined conditions(799.89)     sickle cell trait    Other ill-defined conditions(799.89)     anemia    Psychiatric disorder     depression, bi-polar    Psychotic disorder ([address]     bipolar 1    Sickle cell trait ([address]     Sickle cell trait ([address]     Stomach pain      Past Surgical History:  Past Surgical History:   Procedure Laterality Date    HX GYN      tubaligation    HX GYN      ectopic pregnancy surgery    HX HYSTERECTOMY      HX OTHER SURGICAL      hx of blood transfusions     Family History:  Family History   Problem Relation Age of Onset    Heart Disease Father     Anesth Problems Neg Hx      Social History:  Social History     Tobacco Use    Smoking status: Current Every Day Smoker     Packs/day: 0.25     Years: 9.00     Pack years: 2.25    Smokeless tobacco: Never Used   Substance Use Topics    Alcohol use: No    Drug use: Not Currently     Comment: occasional Allergies: Allergies   Allergen Reactions    Latex Itching    Cetirizine Vertigo     Other reaction(s): Bradycardia    Tramadol Vertigo    Naproxen Vertigo     Review of Systems   Review of Systems   Constitutional: Negative for activity change, appetite change, chills, fatigue, fever and unexpected weight change. HENT: Negative. Negative for congestion, postnasal drip, rhinorrhea, sore throat, trouble swallowing and voice change. Eyes: Negative for photophobia, pain and visual disturbance. Respiratory: Negative for cough, chest tightness, shortness of breath and wheezing. Cardiovascular: Negative for chest pain and palpitations. Gastrointestinal: Positive for abdominal pain, nausea and vomiting. Negative for abdominal distention, anal bleeding, blood in stool, constipation, diarrhea and rectal pain. Genitourinary: Negative for decreased urine volume, difficulty urinating, dysuria, flank pain, frequency, hematuria, pelvic pain, urgency, vaginal bleeding and vaginal discharge. Musculoskeletal: Negative. Negative for arthralgias, back pain, myalgias and neck pain. Skin: Negative. Negative for rash. Neurological: Negative for seizures, syncope, weakness, light-headedness, numbness and headaches. Psychiatric/Behavioral: Negative. Negative for confusion. Physical Exam   Physical Exam  Vitals and nursing note reviewed. Constitutional:       General: She is not in acute distress. Appearance: She is well-developed. She is ill-appearing (vomiting clear emesis). She is not toxic-appearing or diaphoretic. HENT:      Head: Normocephalic and atraumatic. Eyes:      General: Lids are normal. Vision grossly intact. Scleral icterus present. Extraocular Movements: Extraocular movements intact. Conjunctiva/sclera: Conjunctivae normal.      Pupils: Pupils are equal, round, and reactive to light. Cardiovascular:      Rate and Rhythm: Normal rate and regular rhythm.       Heart sounds: Normal heart sounds. Pulmonary:      Effort: Pulmonary effort is normal. No respiratory distress. Breath sounds: Normal breath sounds. No wheezing or rales. Abdominal:      General: Abdomen is flat. Bowel sounds are normal. There is no distension. Palpations: Abdomen is soft. Abdomen is not rigid. There is no mass. Tenderness: There is generalized abdominal tenderness and tenderness in the right lower quadrant. There is no right CVA tenderness, left CVA tenderness, guarding or rebound. Positive signs include McBurney's sign. Negative signs include Caldwell's sign. Musculoskeletal:         General: Normal range of motion. Cervical back: Normal range of motion. Skin:     General: Skin is warm and dry. Neurological:      Mental Status: She is alert and oriented to person, place, and time. Psychiatric:         Mood and Affect: Affect is tearful. Behavior: Behavior normal.       Diagnostic Study Results   Labs -     Recent Results (from the past 12 hour(s))   CBC WITH AUTOMATED DIFF    Collection Time: 06/20/21  1:55 PM   Result Value Ref Range    WBC 8.9 3.6 - 11.0 K/uL    RBC 4.18 3.80 - 5.20 M/uL    HGB 12.3 11.5 - 16.0 g/dL    HCT 36.7 35.0 - 47.0 %    MCV 87.8 80.0 - 99.0 FL    MCH 29.4 26.0 - 34.0 PG    MCHC 33.5 30.0 - 36.5 g/dL    RDW 14.2 11.5 - 14.5 %    PLATELET 055 576 - 290 K/uL    MPV 12.3 8.9 - 12.9 FL    NRBC 0.0 0  WBC    ABSOLUTE NRBC 0.00 0.00 - 0.01 K/uL    NEUTROPHILS 82 (H) 32 - 75 %    LYMPHOCYTES 10 (L) 12 - 49 %    MONOCYTES 6 5 - 13 %    EOSINOPHILS 2 0 - 7 %    BASOPHILS 0 0 - 1 %    IMMATURE GRANULOCYTES 0 0.0 - 0.5 %    ABS. NEUTROPHILS 7.3 1.8 - 8.0 K/UL    ABS. LYMPHOCYTES 0.9 0.8 - 3.5 K/UL    ABS. MONOCYTES 0.5 0.0 - 1.0 K/UL    ABS. EOSINOPHILS 0.2 0.0 - 0.4 K/UL    ABS. BASOPHILS 0.0 0.0 - 0.1 K/UL    ABS. IMM.  GRANS. 0.0 0.00 - 0.04 K/UL    DF AUTOMATED     METABOLIC PANEL, COMPREHENSIVE    Collection Time: 06/20/21  1:55 PM   Result Value Ref Range    Sodium 147 (H) 136 - 145 mmol/L    Potassium 4.0 3.5 - 5.1 mmol/L    Chloride 107 97 - 108 mmol/L    CO2 30 21 - 32 mmol/L    Anion gap 10 5 - 15 mmol/L    Glucose 91 65 - 100 mg/dL    BUN 15 6 - 20 MG/DL    Creatinine 0.91 0.55 - 1.02 MG/DL    BUN/Creatinine ratio 16 12 - 20      GFR est AA >60 >60 ml/min/1.73m2    GFR est non-AA >60 >60 ml/min/1.73m2    Calcium 9.1 8.5 - 10.1 MG/DL    Bilirubin, total 0.8 0.2 - 1.0 MG/DL    ALT (SGPT) 17 12 - 78 U/L    AST (SGOT) 20 15 - 37 U/L    Alk.  phosphatase 77 45 - 117 U/L    Protein, total 7.9 6.4 - 8.2 g/dL    Albumin 4.0 3.5 - 5.0 g/dL    Globulin 3.9 2.0 - 4.0 g/dL    A-G Ratio 1.0 (L) 1.1 - 2.2     LIPASE    Collection Time: 06/20/21  1:55 PM   Result Value Ref Range    Lipase 68 (L) 73 - 393 U/L   URINALYSIS W/ REFLEX CULTURE    Collection Time: 06/20/21  1:55 PM    Specimen: Urine   Result Value Ref Range    Color YELLOW/STRAW      Appearance TURBID (A) CLEAR      Specific gravity 1.010 1.003 - 1.030      pH (UA) 6.0 5.0 - 8.0      Protein >300 (A) NEG mg/dL    Glucose Negative NEG mg/dL    Ketone 15 (A) NEG mg/dL    Bilirubin Negative NEG      Blood LARGE (A) NEG      Urobilinogen 1.0 0.2 - 1.0 EU/dL    Nitrites Positive (A) NEG      Leukocyte Esterase LARGE (A) NEG      WBC  0 - 4 /hpf    RBC 5-10 0 - 5 /hpf    Epithelial cells FEW FEW /lpf    Bacteria 2+ (A) NEG /hpf    UA:UC IF INDICATED URINE CULTURE ORDERED (A) CNI     DRUG SCREEN, URINE    Collection Time: 06/20/21  1:55 PM   Result Value Ref Range    AMPHETAMINES Negative NEG      BARBITURATES Negative NEG      BENZODIAZEPINES Negative NEG      COCAINE Negative NEG      METHADONE Negative NEG      OPIATES Negative NEG      PCP(PHENCYCLIDINE) Negative NEG      THC (TH-CANNABINOL) Positive (A) NEG      Drug screen comment (NOTE)    ETHYL ALCOHOL    Collection Time: 06/20/21  1:55 PM   Result Value Ref Range    ALCOHOL(ETHYL),SERUM <10 <10 MG/DL   LACTIC ACID    Collection Time: 06/20/21 3:04 PM   Result Value Ref Range    Lactic acid 1.2 0.4 - 2.0 MMOL/L       Radiologic Studies -   CT ABD PELV W CONT   Final Result      New moderate right hydronephrosis and hydroureter into the pelvis. The very   distal ureter is poorly visualized due to crowding of pelvic structures. No   evidence for obstructing renal calculus. Hydronephrosis is less pronounced than   the exam on 9/2/2020. No pelvic fluid collection is noted. Bibasilar lung nodules measuring up to 4 mm with a right lower lobe lung nodule   not previously included in the field-of-view. Continued follow-up is recommended      Mild lumbar spondylosis with disc bulge at L2-3 causing at least mild central   stenosis      RECOMMENDATIONS FOR FOLLOW-UP AND MANAGEMENT OF NODULES SMALLER THAN 8 MM   DETECTED INCIDENTALLY AT NONSCREENING CT:      LOW-RISK PATIENTS:      LESS THAN OR EQUAL TO 4 MM:  No follow-up needed. GREATER THAN 4-6 MM:  Follow-up CT at 12 mo; if unchanged, no further follow-up. GREATER THAN 6-8 MM:  Initial follow-up CT at 6-12 months then at 18-24 months   if no change. GREATER THAN 8 MM:  Follow-up CT at around 3, 9, and 24 months, dynamic   contrast-enhanced CT, PET, and/or biopsy. HIGH-RISK PATIENTS:      LESS THAN OR EQUAL TO 4 MM:  Follow-up CT at 12 months; if unchanged, no further   follow-up. GREATER THAN 4-6 MM:  Initial follow-up CT at 6-12 months then at 18-24 months   if no change. GREATER THAN 6-8 MM:  Initial follow-up CT at 3-6 months then at 9-12 and 24   months if no change. GREATER THAN 8 MM:  Same as for low-risk patient. CT ABD PELV W CONT    Result Date: 6/20/2021  New moderate right hydronephrosis and hydroureter into the pelvis. The very distal ureter is poorly visualized due to crowding of pelvic structures. No evidence for obstructing renal calculus. Hydronephrosis is less pronounced than the exam on 9/2/2020. No pelvic fluid collection is noted.  Bibasilar lung nodules measuring up to 4 mm with a right lower lobe lung nodule not previously included in the field-of-view. Continued follow-up is recommended Mild lumbar spondylosis with disc bulge at L2-3 causing at least mild central stenosis RECOMMENDATIONS FOR FOLLOW-UP AND MANAGEMENT OF NODULES SMALLER THAN 8 MM DETECTED INCIDENTALLY AT NONSCREENING CT: LOW-RISK PATIENTS: LESS THAN OR EQUAL TO 4 MM:  No follow-up needed. GREATER THAN 4-6 MM:  Follow-up CT at 12 mo; if unchanged, no further follow-up. GREATER THAN 6-8 MM:  Initial follow-up CT at 6-12 months then at 18-24 months if no change. GREATER THAN 8 MM:  Follow-up CT at around 3, 9, and 24 months, dynamic contrast-enhanced CT, PET, and/or biopsy. HIGH-RISK PATIENTS: LESS THAN OR EQUAL TO 4 MM:  Follow-up CT at 12 months; if unchanged, no further follow-up. GREATER THAN 4-6 MM:  Initial follow-up CT at 6-12 months then at 18-24 months if no change. GREATER THAN 6-8 MM:  Initial follow-up CT at 3-6 months then at 9-12 and 24 months if no change. GREATER THAN 8 MM:  Same as for low-risk patient. Medical Decision Making   I am the first provider for this patient. I reviewed the vital signs, available nursing notes, past medical history, past surgical history, family history and social history. Vital Signs-Reviewed the patient's vital signs. Patient Vitals for the past 24 hrs:   Temp Pulse Resp BP SpO2   06/20/21 1330 98.1 °F (36.7 °C) 88 18 119/77 98 %     Pulse Oximetry Analysis - 98% on RA (normal)    Records Reviewed: Nursing Notes, Old Medical Records, Previous electrocardiograms, Previous Radiology Studies and Previous Laboratory Studies    Provider Notes (Medical Decision Making):   Patient presents with RLQ abdominal pain. DDx: gastroenteritis, kidney stone, Appendicitis, colitis, malrotation, ovarian pathology, hernia. Will obtain Urine, labwork and CT based on concern. ED Course:   Initial assessment performed.  The patients presenting problems have been discussed, and they are in agreement with the care plan formulated and outlined with them. I have encouraged them to ask questions as they arise throughout their visit. ED Course as of Jun 20 1552   Sun Jun 20, 2021   1547 Pt resting comfortably in room in NAD. No new symptoms or complaints at this time. Available labs/ results reviewed with pt. patient endorses that she is feeling much better and is requesting discharge as she needs to take care of her children. I strongly encouraged follow-up with urology as well as educated on strict ED precautions and red flag symptoms to return to the ED. Plan to switch antibiotics today to treat for UTI.    [SM]      ED Course User Index  [SM] Forest Claire PA-C       Progress Note:   Updated pt on all returned results and findings. Discussed the importance of proper follow up as referred below along with return precautions. Pt in agreement with the care plan and expresses agreement with and understanding of all items discussed. Disposition:  3:51 PM  I have discussed with patient their diagnosis, treatment, and follow up plan. The patient agrees to follow up as outlined in discharge paperwork and also to return to the ED with any worsening. Maria E Callejas PA-C      PLAN:  1. Current Discharge Medication List      START taking these medications    Details   ciprofloxacin HCl (Cipro) 500 mg tablet Take 1 Tablet by mouth two (2) times a day for 7 days. Qty: 14 Tablet, Refills: 0  Start date: 6/20/2021, End date: 6/27/2021         CONTINUE these medications which have CHANGED    Details   ondansetron (ZOFRAN ODT) 4 mg disintegrating tablet Take 1 Tablet by mouth every eight (8) hours as needed for Nausea. Qty: 8 Tablet, Refills: 0  Start date: 6/20/2021           2.    Follow-up Information     Follow up With Specialties Details Why Contact Raulito King NP Nurse Practitioner Schedule an appointment as soon as possible for a visit in 2 days As needed [place]  [address] [facility]  Schedule an appointment as soon as possible for a visit in 1 day As needed [address] [facility] [phone]        Return to ED if worse     Diagnosis     Clinical Impression:   1. Acute cystitis with hematuria    2. Hydronephrosis, unspecified hydronephrosis type    3. Non-intractable vomiting with nausea, unspecified vomiting type    4. Abdominal pain, right lower quadrant    5. Lung nodules            Please note that this dictation was completed with Dragon, computer voice recognition software. Quite often unanticipated grammatical, syntax, homophones, and other interpretive errors are inadvertently transcribed by the computer software. Please disregard these errors. Additionally, please excuse any errors that have escaped final proofreading.

## 2021-06-22 LAB
BACTERIA SPEC CULT: ABNORMAL
CC UR VC: ABNORMAL
SERVICE CMNT-IMP: ABNORMAL

## 2021-06-25 LAB
BACTERIA SPEC CULT: NORMAL
SERVICE CMNT-IMP: NORMAL

## 2021-08-21 ENCOUNTER — HOSPITAL ENCOUNTER (EMERGENCY)
Age: 36
Discharge: HOME | End: 2021-08-21
Attending: EMERGENCY MEDICINE | Admitting: EMERGENCY MEDICINE
Payer: MEDICAID

## 2021-08-21 VITALS
DIASTOLIC BLOOD PRESSURE: 65 MMHG | OXYGEN SATURATION: 100 % | WEIGHT: 140 LBS | SYSTOLIC BLOOD PRESSURE: 104 MMHG | TEMPERATURE: 98 F | HEIGHT: 67 IN | RESPIRATION RATE: 14 BRPM | BODY MASS INDEX: 21.97 KG/M2 | HEART RATE: 75 BPM

## 2021-08-21 DIAGNOSIS — K59.09 OTHER CONSTIPATION: Primary | ICD-10-CM

## 2021-08-21 LAB
APPEARANCE UR: CLEAR
BILIRUB UR QL: NEGATIVE
COLOR UR: NORMAL
GLUCOSE UR STRIP.AUTO-MCNC: NEGATIVE MG/DL
HGB UR QL STRIP: NEGATIVE
KETONES UR QL STRIP.AUTO: NEGATIVE MG/DL
LEUKOCYTE ESTERASE UR QL STRIP.AUTO: NEGATIVE
NITRITE UR QL STRIP.AUTO: NEGATIVE
PH UR STRIP: 7 [PH] (ref 5–8)
PROT UR STRIP-MCNC: NEGATIVE MG/DL
SP GR UR REFRACTOMETRY: 1.01 (ref 1–1.03)
UROBILINOGEN UR QL STRIP.AUTO: 1 EU/DL (ref 0.2–1)

## 2021-08-21 PROCEDURE — 99284 EMERGENCY DEPT VISIT MOD MDM: CPT

## 2021-08-21 PROCEDURE — 81003 URINALYSIS AUTO W/O SCOPE: CPT

## 2021-08-21 RX ORDER — MAGNESIUM CITRATE
296 SOLUTION, ORAL ORAL ONCE
Qty: 1 BOTTLE | Refills: 0 | Status: SHIPPED | OUTPATIENT
Start: 2021-08-21 | End: 2021-08-21

## 2021-08-21 NOTE — DISCHARGE INSTRUCTIONS
You were seen here in the emergency department for difficulty moving your bowels. Your exam and vital signs here were reassuring and a urine evaluation did not show any evidence of infection. It is recommended that you take a laxative at home and a prescription for magnesium citrate has been sent to your preferred pharmacy. Please take as directed. Of note, after drinking this liquid your stomach will be very gurgly and bubbly and sound. Remember that we discussed the importance of hydration to keep your bowels soft and you may benefit from some over-the-counter laxatives that you can also  at your pharmacy. Thank you for letting us take care of you today.

## 2021-08-21 NOTE — ED PROVIDER NOTES
EMERGENCY DEPARTMENT HISTORY AND PHYSICAL EXAM      Date: 8/21/2021  Patient Name: Satinder Almonte    History of Presenting Illness     Chief Complaint   Patient presents with    Abdominal Pain     Presents to the ED via EMS with c/o lower abdominal pain and constipation x several days. No distress or any obvious concerns noted in triage.  Constipation       History Provided By: Patient    HPI: Satinder Almonte, 38 y.o. female presents to the ED with complaint of inability to have a bowel movement for the past 3 days. She is finding it hard to get her urine out at the same time. She says she does have a history of constipation, had some mild nausea with it but no vomiting. She denies any fever. She denies any unusual vaginal discharge. She denies any blood in her stool. She has not tried any over-the-counter remedies. She says she is not very good at drinking water to stay hydrated. There are no other complaints, changes, or physical findings at this time. PCP: Liang Carter NP    No current facility-administered medications on file prior to encounter. Current Outpatient Medications on File Prior to Encounter   Medication Sig Dispense Refill    ondansetron (ZOFRAN ODT) 4 mg disintegrating tablet Take 1 Tablet by mouth every eight (8) hours as needed for Nausea. 8 Tablet 0    alum-mag hydroxide-simeth (Maalox Advanced) 200-200-20 mg/5 mL susp Take 30 mL by mouth every four (4) hours as needed for Indigestion.  [DISCONTINUED] magnesium citrate solution Take 296 mL by mouth now.  medroxyPROGESTERone (PROVERA) 5 mg tablet Take 1 Tab by mouth daily. 30 Tab 11    ibuprofen (MOTRIN) 800 mg tablet Take 1 Tab by mouth every eight (8) hours as needed for Pain. 40 Tab 0    docusate sodium (COLACE) 100 mg capsule Take 1 Cap by mouth two (2) times a day.  Stop for loose stools 60 Cap 0       Past History     Past Medical History:  Past Medical History:   Diagnosis Date    Abscess 12/1/2016    Anemia NEC     Bipolar 1 disorder (HCC)     Breast tenderness     bilateral, since 11/2016, on and off     Cancer [facility]) 2008    CERVICAL    Depression     Dizziness     Gastrointestinal disorder     pancreatitis    GERD (gastroesophageal reflux disease)     Increased frequency of headaches     Kidney stones 10/2012    Nausea     Nipple discharge 2009    white/clear discharge since birth of child     Other ill-defined conditions(799.89)     sickle cell trait    Other ill-defined conditions(799.89)     anemia    Psychiatric disorder     depression, bi-polar    Psychotic disorder ([address]     bipolar 1    Sickle cell trait ([address]     Sickle cell trait ([address]     Stomach pain        Past Surgical History:  Past Surgical History:   Procedure Laterality Date    HX GYN      tubaligation    HX GYN      ectopic pregnancy surgery    HX HYSTERECTOMY      HX OTHER SURGICAL      hx of blood transfusions       Family History:  Family History   Problem Relation Age of Onset    Heart Disease Father     Anesth Problems Neg Hx        Social History:  Social History     Tobacco Use    Smoking status: Current Every Day Smoker     Packs/day: 0.25     Years: 9.00     Pack years: 2.25    Smokeless tobacco: Never Used   Substance Use Topics    Alcohol use: No    Drug use: Not Currently     Comment: occasional       Allergies: Allergies   Allergen Reactions    Latex Itching    Cetirizine Vertigo     Other reaction(s): Bradycardia    Tramadol Vertigo    Naproxen Vertigo         Review of Systems   Review of Systems   Constitutional: Negative for activity change, appetite change, fatigue and fever. HENT: Negative. Respiratory: Negative for shortness of breath. Cardiovascular: Negative for chest pain. Gastrointestinal: Negative. Genitourinary: Positive for difficulty urinating and pelvic pain. Negative for decreased urine volume, dysuria, hematuria and urgency.         Patient has a history of a hysterectomy, denies any vaginal discharge. Neurological: Negative for dizziness, syncope and light-headedness. All other systems reviewed and are negative. Physical Exam   Physical Exam   Vital signs and nursing notes reviewed    CONSTITUTIONAL: Alert, in mild distress; well-developed; well-nourished. Thin build. HEAD:  Normocephalic, atraumatic  EYES: PERRL; EOM's intact. Nonicteric sclera. ENTM: Nose: no rhinorrhea; Throat: no erythema or exudate, mucous membranes moist  Neck:  Supple. trachea is midline. RESP: Chest clear, equal breath sounds. - W/R/R  CV: S1 and S2 WNL; No murmurs, gallops or rubs. 2+ radial and DP pulses bilaterally. GI: non-distended, normal bowel sounds, abdomen soft and non-tender. No masses or organomegaly. No palpable distended bladder. : No costo-vertebral angle tenderness. UPPER EXT:  Normal inspection. no joint or soft tissue swelling  LOWER EXT: No edema, no calf tenderness. NEURO: Alert and oriented x3, 5/5 strength and light touch sensation intact in bilateral upper and lower extremities. Stable gait in the hallway to the bathroom. SKIN: No rashes; Warm and dry  PSYCH: Normal mood, normal affect    Diagnostic Study Results     Labs -     Recent Results (from the past 12 hour(s))   URINALYSIS W/ RFLX MICROSCOPIC    Collection Time: 08/21/21  1:14 PM   Result Value Ref Range    Color YELLOW/STRAW      Appearance CLEAR CLEAR      Specific gravity 1.012 1.003 - 1.030      pH (UA) 7.0 5.0 - 8.0      Protein Negative NEG mg/dL    Glucose Negative NEG mg/dL    Ketone Negative NEG mg/dL    Bilirubin Negative NEG      Blood Negative NEG      Urobilinogen 1.0 0.2 - 1.0 EU/dL    Nitrites Negative NEG      Leukocyte Esterase Negative NEG         Radiologic Studies -   No orders to display     CT Results  (Last 48 hours)    None        CXR Results  (Last 48 hours)    None          Medical Decision Making   I am the first provider for this patient.     I reviewed the vital signs, available nursing notes, past medical history, past surgical history, family history and social history. Vital Signs-Reviewed the patient's vital signs. Patient Vitals for the past 12 hrs:   Temp Pulse Resp BP SpO2   08/21/21 1201 98 °F (36.7 °C) 75 14 104/65 100 %           Records Reviewed: Nursing Notes and Old Medical Records    Provider Notes (Medical Decision Making):   26-year-old female with history consistent with constipation with nonsurgical abdomen not concerning for obstruction or focal infection. Screen urine for urinary tract infection, was reassuring. Discussed home care including hydration, prescription for magnesium citrate and over-the-counter laxatives. Discussed return precautions with patient and her mom via FaceTime as patient was on FaceTime with her mom when I went to provide discharge care instructions. ED Course:   Initial assessment performed. The patients presenting problems have been discussed, and they are in agreement with the care plan formulated and outlined with them. I have encouraged them to ask questions as they arise throughout their visit. ED Course as of Aug 21 1355   Sat Aug 21, 2021   1353 Patient results waiting area and reevaluate abdomen soft nontender. Spoke to patient's mother via FaceTime and explained findings, reassuring urinalysis as well as plan for magnesium citrate, hydration and potential over-the-counter laxatives to help patient move her bowels. Discussed return precautions with both patient and her mother. Both voiced gratefulness and understanding. [TL]      ED Course User Index  [TL] Danya Davis MD           Disposition:  Discharge    Discharge Note:  1:55 PM  The pt is ready for discharge. The pt's signs, symptoms, diagnosis, and discharge instructions have been discussed and pt has conveyed their understanding. The pt is to follow up as recommended or return to ER should their symptoms worsen.  Plan has been discussed and pt is in agreement. DISCHARGE PLAN:  1. Current Discharge Medication List      CONTINUE these medications which have CHANGED    Details   magnesium citrate solution Take 296 mL by mouth once for 1 dose. Qty: 1 Bottle, Refills: 0  Start date: 8/21/2021, End date: 8/21/2021           2. Follow-up Information     Follow up With Specialties Details Why Contact Info    [facility] EMERGENCY DEPT Emergency Medicine  If symptoms worsen including blood from your rectum, new abdominal pain, fever, uncontrolled vomiting or other new concerning symptoms. [address] [place] [address]  [phone]    Franceen Bamberger, NP Nurse Practitioner  As needed [address] [place]  [address]  [phone]          3. Return to ED if worse     Diagnosis     Clinical Impression:   1. Other constipation        Attestations:    Ambrocio Jules MD    Please note that this dictation was completed with [employer], the computer voice recognition software. Quite often unanticipated grammatical, syntax, homophones, and other interpretive errors are inadvertently transcribed by the computer software. Please disregard these errors. Please excuse any errors that have escaped final proofreading. Thank you.

## 2021-08-27 ENCOUNTER — HOSPITAL ENCOUNTER (EMERGENCY)
Age: 36
Discharge: HOME | End: 2021-08-27
Attending: EMERGENCY MEDICINE
Payer: MEDICAID

## 2021-08-27 ENCOUNTER — APPOINTMENT (OUTPATIENT)
Dept: CT IMAGING | Age: 36
End: 2021-08-27
Attending: EMERGENCY MEDICINE
Payer: MEDICAID

## 2021-08-27 VITALS
BODY MASS INDEX: 18.96 KG/M2 | OXYGEN SATURATION: 100 % | RESPIRATION RATE: 16 BRPM | HEIGHT: 67 IN | WEIGHT: 120.81 LBS | SYSTOLIC BLOOD PRESSURE: 104 MMHG | DIASTOLIC BLOOD PRESSURE: 77 MMHG | HEART RATE: 84 BPM | TEMPERATURE: 98.1 F

## 2021-08-27 DIAGNOSIS — N13.30 HYDRONEPHROSIS, UNSPECIFIED HYDRONEPHROSIS TYPE: ICD-10-CM

## 2021-08-27 DIAGNOSIS — R10.9 FLANK PAIN, ACUTE: Primary | ICD-10-CM

## 2021-08-27 LAB
APPEARANCE UR: ABNORMAL
BACTERIA URNS QL MICRO: NEGATIVE /HPF
BILIRUB UR QL: NEGATIVE
COLOR UR: ABNORMAL
EPITH CASTS URNS QL MICRO: ABNORMAL /LPF
GLUCOSE UR STRIP.AUTO-MCNC: NEGATIVE MG/DL
HCG UR QL: NEGATIVE
HGB UR QL STRIP: NEGATIVE
KETONES UR QL STRIP.AUTO: ABNORMAL MG/DL
LEUKOCYTE ESTERASE UR QL STRIP.AUTO: NEGATIVE
NITRITE UR QL STRIP.AUTO: NEGATIVE
PH UR STRIP: 5.5 [PH] (ref 5–8)
PROT UR STRIP-MCNC: ABNORMAL MG/DL
RBC #/AREA URNS HPF: ABNORMAL /HPF (ref 0–5)
SP GR UR REFRACTOMETRY: 1.01 (ref 1–1.03)
UA: UC IF INDICATED,UAUC: ABNORMAL
UROBILINOGEN UR QL STRIP.AUTO: 0.2 EU/DL (ref 0.2–1)
WBC URNS QL MICRO: ABNORMAL /HPF (ref 0–4)

## 2021-08-27 PROCEDURE — 81001 URINALYSIS AUTO W/SCOPE: CPT

## 2021-08-27 PROCEDURE — 74176 CT ABD & PELVIS W/O CONTRAST: CPT

## 2021-08-27 PROCEDURE — 81025 URINE PREGNANCY TEST: CPT

## 2021-08-27 PROCEDURE — 99283 EMERGENCY DEPT VISIT LOW MDM: CPT

## 2021-08-27 NOTE — ED PROVIDER NOTES
EMERGENCY DEPARTMENT HISTORY AND PHYSICAL EXAM      Date: 8/27/2021  Patient Name: Xander Espino    History of Presenting Illness     Chief Complaint   Patient presents with    Flank Pain    Abdominal Pain       History Provided By: Patient    HPI: Xander Espino, 38 y.o. female with PMHx of bipolar disorder, anemia, Sickle cell trait, cancer, and GERD presents to the ED with cc of several days of lower abd pain. Pt states she was seen at [address] [facility] for similar Sx and was given a CT that showed hydronephrosis on her L kidney. States she was constipated at the time and given a GI cocktail for it and was feeling better at discharge. States her pain returned today and radiating to her LE and L flank. Denies vaginal discharge, chills, fever, or SOB. There are no other complaints, changes, or physical findings at this time. PCP: None    No current facility-administered medications on file prior to encounter. Current Outpatient Medications on File Prior to Encounter   Medication Sig Dispense Refill    docusate sodium (COLACE) 100 mg capsule Take 1 Cap by mouth two (2) times a day. Stop for loose stools 60 Cap 0    ondansetron (ZOFRAN ODT) 4 mg disintegrating tablet Take 1 Tablet by mouth every eight (8) hours as needed for Nausea. (Patient not taking: Reported on 8/27/2021) 8 Tablet 0    alum-mag hydroxide-simeth (Maalox Advanced) 200-200-20 mg/5 mL susp Take 30 mL by mouth every four (4) hours as needed for Indigestion. (Patient not taking: Reported on 8/27/2021)      medroxyPROGESTERone (PROVERA) 5 mg tablet Take 1 Tab by mouth daily. (Patient not taking: Reported on 8/27/2021) 30 Tab 11    ibuprofen (MOTRIN) 800 mg tablet Take 1 Tab by mouth every eight (8) hours as needed for Pain.  (Patient not taking: Reported on 8/27/2021) 40 Tab 0       Past History     Past Medical History:  Past Medical History:   Diagnosis Date    Abscess 12/1/2016    Anemia NEC     [facility] disorder [address]     Breast tenderness     bilateral, since 11/2016, on and off     Cancer [facility]) 2008    CERVICAL    Depression     Dizziness     Gastrointestinal disorder     pancreatitis    GERD (gastroesophageal reflux disease)     Increased frequency of headaches     Kidney stones 10/2012    Nausea     Nipple discharge 2009    white/clear discharge since birth of child     Other ill-defined conditions(799.89)     sickle cell trait    Other ill-defined conditions(799.89)     anemia    Psychiatric disorder     depression, bi-polar    [address]otic disorder [address]     bipolar 1    Sickle cell trait ([facility]     Sickle cell trait ([facility]     Stomach pain        Past Surgical History:  Past Surgical History:   Procedure Laterality Date    HX GYN      tubaligation    HX GYN      ectopic pregnancy surgery    HX HYSTERECTOMY      HX OTHER SURGICAL      hx of blood transfusions       Family History:  Family History   Problem Relation Age of Onset    Heart Disease Father     Anesth Problems Neg Hx        Social History:  Social History     Tobacco Use    Smoking status: Current Every Day Smoker     Packs/day: 0.25     Years: 9.00     Pack years: 2.25    Smokeless tobacco: Never Used   Substance Use Topics    Alcohol use: No    Drug use: Not Currently     Comment: occasional       Allergies: Allergies   Allergen Reactions    Latex Itching    Cetirizine Vertigo     Other reaction(s): Bradycardia    Tramadol Vertigo    Naproxen Vertigo         Review of Systems   Review of Systems   Constitutional: Negative for chills, fatigue and fever. HENT: Negative. Negative for sore throat. Eyes: Negative. Respiratory: Negative for cough and shortness of breath. Cardiovascular: Negative for chest pain and palpitations. Gastrointestinal: Positive for abdominal pain. Negative for nausea and vomiting. Genitourinary: Negative for dysuria and vaginal discharge. Musculoskeletal: Positive for arthralgias. Skin: Negative. Neurological: Negative for dizziness, weakness, light-headedness and headaches. Psychiatric/Behavioral: Negative. All other systems reviewed and are negative. Physical Exam   Physical Exam  Vitals and nursing note reviewed. Exam conducted with a chaperone present. Constitutional:       Appearance: Normal appearance. She is not toxic-appearing. HENT:      Head: Normocephalic and atraumatic. Mouth/Throat:      Mouth: Mucous membranes are moist.   Eyes:      Extraocular Movements: Extraocular movements intact. Pupils: Pupils are equal, round, and reactive to light. Cardiovascular:      Rate and Rhythm: Normal rate and regular rhythm. Pulmonary:      Effort: Pulmonary effort is normal. No respiratory distress. Breath sounds: Normal breath sounds. No wheezing, rhonchi or rales. Abdominal:      General: Abdomen is flat. There is no distension. Palpations: Abdomen is soft. Tenderness: There is no abdominal tenderness. Hernia: No hernia is present. Musculoskeletal:         General: No swelling or tenderness. Normal range of motion. Skin:     General: Skin is warm and dry. Neurological:      General: No focal deficit present. Mental Status: She is alert and oriented to person, place, and time. Cranial Nerves: No cranial nerve deficit. Sensory: No sensory deficit. Psychiatric:         Mood and Affect: Mood normal.         Behavior: Behavior normal.         Diagnostic Study Results     Labs -   No results found for this or any previous visit (from the past 12 hour(s)). Radiologic Studies -   CT ABD PELV WO CONT    (Results Pending)     CT Results  (Last 48 hours)    None        CXR Results  (Last 48 hours)    None          Medical Decision Making   I am the first provider for this patient. I reviewed the vital signs, available nursing notes, past medical history, past surgical history, family history and social history.     Vital Signs-Reviewed the patient's vital signs. Patient Vitals for the past 12 hrs:   Temp Pulse Resp BP SpO2   08/27/21 1106 98.1 °F (36.7 °C) 84 16 104/77 100 %         Records Reviewed: Nursing Notes, Previous Radiology Studies and Previous Laboratory Studies    Provider Notes (Medical Decision Making):   UTI, ovarian cyst, kidney stones    ED Course:   Initial assessment performed. The patients presenting problems have been discussed, and they are in agreement with the care plan formulated and outlined with them. I have encouraged them to ask questions as they arise throughout their visit. Critical Care Time:   none      Disposition:  DISCHARGE  2:09 PM  The patient has been re-evaluated and is ready for discharge. Reviewed available results with patient. Counseled pt on diagnosis and care plan. Pt has expressed understanding, and all questions have been answered. Pt agrees with plan and agrees to follow up as recommended, or return to the ED if their symptoms worsen. Discharge instructions have been provided and explained to the pt, along with reasons to return to the ED. DISCHARGE PLAN:  1. Current Discharge Medication List        2. Follow-up Information    None       3. Return to ED if worse     Diagnosis     Clinical Impression: No diagnosis found. Attestations: This note is prepared by Navjot Nguyen, acting as Scribe for Su Villatoro MD.     Su Villatoro MD. The scribe's documentation has been prepared under my direction and personally reviewed by me in its entirety. I confirm that the note above accurately reflects all work, treatment, procedures and medical decision making performed by me.

## 2021-08-27 NOTE — ED NOTES
Patient given copy of dc instructions. Patient verbalized understanding of instructions. Patient given a current medication reconciliation form and verbalized understanding of their medications. Patient  verbalized understanding of the importance of discussing medications with  his or her physician or clinic they will be following up with. Patient alert and oriented and in no acute distress. Patient discharged home ambulatory.

## 2021-08-27 NOTE — ED NOTES
Patient reports lower abdominal pain that radiates to her left flank/side area that started this morning.   She was seen in the ED last week for similar pain and was given Magnesioum citrate with positive results

## 2021-08-27 NOTE — ED TRIAGE NOTES
Reports left flank pain and abd pain that started on Sunday. States she went to [address] and was prescribed meds for constipation which states worked however she is still having the pain that she originally went in for. No vomiting or diarrhea. Denies GI and  symptoms.

## 2021-10-04 ENCOUNTER — APPOINTMENT (OUTPATIENT)
Dept: CT IMAGING | Age: 36
End: 2021-10-04
Attending: EMERGENCY MEDICINE
Payer: MEDICAID

## 2021-10-04 ENCOUNTER — APPOINTMENT (OUTPATIENT)
Dept: GENERAL RADIOLOGY | Age: 36
End: 2021-10-04
Attending: EMERGENCY MEDICINE
Payer: MEDICAID

## 2021-10-04 ENCOUNTER — HOSPITAL ENCOUNTER (EMERGENCY)
Age: 36
Discharge: HOME | End: 2021-10-05
Attending: EMERGENCY MEDICINE
Payer: MEDICAID

## 2021-10-04 VITALS
HEART RATE: 91 BPM | TEMPERATURE: 98.5 F | SYSTOLIC BLOOD PRESSURE: 129 MMHG | OXYGEN SATURATION: 99 % | DIASTOLIC BLOOD PRESSURE: 74 MMHG | BODY MASS INDEX: 18.99 KG/M2 | WEIGHT: 121 LBS | HEIGHT: 67 IN | RESPIRATION RATE: 20 BRPM

## 2021-10-04 DIAGNOSIS — R11.2 NAUSEA AND VOMITING IN ADULT: ICD-10-CM

## 2021-10-04 DIAGNOSIS — E86.0 DEHYDRATION: ICD-10-CM

## 2021-10-04 DIAGNOSIS — K59.00 CONSTIPATION, UNSPECIFIED CONSTIPATION TYPE: ICD-10-CM

## 2021-10-04 DIAGNOSIS — R10.9 ACUTE ABDOMINAL PAIN: Primary | ICD-10-CM

## 2021-10-04 LAB
ALBUMIN SERPL-MCNC: 4.3 G/DL (ref 3.5–5)
ALBUMIN/GLOB SERPL: 1.1 {RATIO} (ref 1.1–2.2)
ALP SERPL-CCNC: 82 U/L (ref 45–117)
ALT SERPL-CCNC: 18 U/L (ref 12–78)
ANION GAP SERPL CALC-SCNC: 4 MMOL/L (ref 5–15)
APPEARANCE UR: CLEAR
AST SERPL-CCNC: 13 U/L (ref 15–37)
BACTERIA URNS QL MICRO: NEGATIVE /HPF
BILIRUB SERPL-MCNC: 0.9 MG/DL (ref 0.2–1)
BILIRUB UR QL: NEGATIVE
BUN SERPL-MCNC: 14 MG/DL (ref 6–20)
BUN/CREAT SERPL: 16 (ref 12–20)
CALCIUM SERPL-MCNC: 9.9 MG/DL (ref 8.5–10.1)
CHLORIDE SERPL-SCNC: 106 MMOL/L (ref 97–108)
CO2 SERPL-SCNC: 29 MMOL/L (ref 21–32)
COLOR UR: ABNORMAL
CREAT SERPL-MCNC: 0.89 MG/DL (ref 0.55–1.02)
EPITH CASTS URNS QL MICRO: ABNORMAL /LPF
ERYTHROCYTE [DISTWIDTH] IN BLOOD BY AUTOMATED COUNT: 14.6 % (ref 11.5–14.5)
GLOBULIN SER CALC-MCNC: 3.9 G/DL (ref 2–4)
GLUCOSE SERPL-MCNC: 97 MG/DL (ref 65–100)
GLUCOSE UR STRIP.AUTO-MCNC: NEGATIVE MG/DL
HCG UR QL: NEGATIVE
HCT VFR BLD AUTO: 35.3 % (ref 35–47)
HGB BLD-MCNC: 12.7 G/DL (ref 11.5–16)
HGB UR QL STRIP: ABNORMAL
KETONES UR QL STRIP.AUTO: >80 MG/DL
LEUKOCYTE ESTERASE UR QL STRIP.AUTO: NEGATIVE
LIPASE SERPL-CCNC: 125 U/L (ref 73–393)
MCH RBC QN AUTO: 31.2 PG (ref 26–34)
MCHC RBC AUTO-ENTMCNC: 36 G/DL (ref 30–36.5)
MCV RBC AUTO: 86.7 FL (ref 80–99)
NITRITE UR QL STRIP.AUTO: NEGATIVE
NRBC # BLD: 0 K/UL (ref 0–0.01)
NRBC BLD-RTO: 0 PER 100 WBC
PH UR STRIP: 5.5 [PH] (ref 5–8)
PLATELET # BLD AUTO: 182 K/UL (ref 150–400)
PMV BLD AUTO: 11.6 FL (ref 8.9–12.9)
POTASSIUM SERPL-SCNC: 3.6 MMOL/L (ref 3.5–5.1)
PROT SERPL-MCNC: 8.2 G/DL (ref 6.4–8.2)
PROT UR STRIP-MCNC: NEGATIVE MG/DL
RBC # BLD AUTO: 4.07 M/UL (ref 3.8–5.2)
RBC #/AREA URNS HPF: ABNORMAL /HPF (ref 0–5)
SODIUM SERPL-SCNC: 139 MMOL/L (ref 136–145)
SP GR UR REFRACTOMETRY: 1.02 (ref 1–1.03)
UA: UC IF INDICATED,UAUC: ABNORMAL
UROBILINOGEN UR QL STRIP.AUTO: 0.2 EU/DL (ref 0.2–1)
WBC # BLD AUTO: 6.2 K/UL (ref 3.6–11)
WBC URNS QL MICRO: ABNORMAL /HPF (ref 0–4)

## 2021-10-04 PROCEDURE — 96374 THER/PROPH/DIAG INJ IV PUSH: CPT

## 2021-10-04 PROCEDURE — 74011000636 HC RX REV CODE- 636: Performed by: EMERGENCY MEDICINE

## 2021-10-04 PROCEDURE — 99284 EMERGENCY DEPT VISIT MOD MDM: CPT

## 2021-10-04 PROCEDURE — 83690 ASSAY OF LIPASE: CPT

## 2021-10-04 PROCEDURE — 81001 URINALYSIS AUTO W/SCOPE: CPT

## 2021-10-04 PROCEDURE — 74177 CT ABD & PELVIS W/CONTRAST: CPT

## 2021-10-04 PROCEDURE — 74011250637 HC RX REV CODE- 250/637: Performed by: EMERGENCY MEDICINE

## 2021-10-04 PROCEDURE — 85027 COMPLETE CBC AUTOMATED: CPT

## 2021-10-04 PROCEDURE — 36415 COLL VENOUS BLD VENIPUNCTURE: CPT

## 2021-10-04 PROCEDURE — 74011250636 HC RX REV CODE- 250/636: Performed by: EMERGENCY MEDICINE

## 2021-10-04 PROCEDURE — 96361 HYDRATE IV INFUSION ADD-ON: CPT

## 2021-10-04 PROCEDURE — 81025 URINE PREGNANCY TEST: CPT

## 2021-10-04 PROCEDURE — 80053 COMPREHEN METABOLIC PANEL: CPT

## 2021-10-04 PROCEDURE — 74018 RADEX ABDOMEN 1 VIEW: CPT

## 2021-10-04 RX ORDER — ONDANSETRON 2 MG/ML
4 INJECTION INTRAMUSCULAR; INTRAVENOUS
Status: COMPLETED | OUTPATIENT
Start: 2021-10-04 | End: 2021-10-04

## 2021-10-04 RX ORDER — MAGNESIUM CITRATE
296 SOLUTION, ORAL ORAL
Status: COMPLETED | OUTPATIENT
Start: 2021-10-04 | End: 2021-10-04

## 2021-10-04 RX ADMIN — ONDANSETRON 4 MG: 2 INJECTION INTRAMUSCULAR; INTRAVENOUS at 21:28

## 2021-10-04 RX ADMIN — MAGNESIUM CITRATE 296 ML: 1.75 LIQUID ORAL at 23:23

## 2021-10-04 RX ADMIN — IOPAMIDOL 100 ML: 755 INJECTION, SOLUTION INTRAVENOUS at 21:29

## 2021-10-04 RX ADMIN — SODIUM CHLORIDE 1000 ML: 9 INJECTION, SOLUTION INTRAVENOUS at 21:30

## 2021-10-05 NOTE — ED NOTES
Pt to room by wheelchair and self ambulates to bed. PT here today with complaints of constipation X2 days. Pt states that she is having lower abd pain due to not being able to empty her bowels. Pt states that she started having nausea and vomiting this morning due to \"something I ate\". PT states that she was finally able to get some stool out and it was small, hard, dark and that there was some blood when she whipped. PT denies chest pain, sob, fever, chills, cough, urinary symptoms. Pt ANOX4, respirations even and unlabored, skin warm dry and intact, NAD noted. Pt states history of sickle cell trait.

## 2021-10-05 NOTE — ED NOTES
I have reviewed discharge instructions with the patient. The patient verbalized understanding. PT refused discharge vitals.

## 2021-10-05 NOTE — ED PROVIDER NOTES
EMERGENCY DEPARTMENT HISTORY AND PHYSICAL EXAM      Date: 10/4/2021  Patient Name: Bonilla Michel    History of Presenting Illness     Chief Complaint   Patient presents with    Abdominal Pain     Abd pain that started this evening. No BM for 24 hours. She has vomited multiple times.  Constipation    Vomiting    Nausea       History Provided By: Patient    HPI: Bonilla Michel, 38 y.o. female presents to the ED with cc of abdominal pain and constipation. Pain started today. .  She has vomited multiple times today. She states she is lightheaded. Pain was a 7 out of 10 in severity and involves the lower quadrants. The pain has resolved. She denies back pain, chest pain, shortness of breath, fever, headache or dysuria. There are no other complaints, changes, or physical findings at this time. PCP: None    No current facility-administered medications on file prior to encounter. Current Outpatient Medications on File Prior to Encounter   Medication Sig Dispense Refill    ondansetron (ZOFRAN ODT) 4 mg disintegrating tablet Take 1 Tablet by mouth every eight (8) hours as needed for Nausea. (Patient not taking: Reported on 8/27/2021) 8 Tablet 0    alum-mag hydroxide-simeth (Maalox Advanced) 200-200-20 mg/5 mL susp Take 30 mL by mouth every four (4) hours as needed for Indigestion. (Patient not taking: Reported on 8/27/2021)      medroxyPROGESTERone (PROVERA) 5 mg tablet Take 1 Tab by mouth daily. (Patient not taking: Reported on 8/27/2021) 30 Tab 11    ibuprofen (MOTRIN) 800 mg tablet Take 1 Tab by mouth every eight (8) hours as needed for Pain. (Patient not taking: Reported on 8/27/2021) 40 Tab 0    docusate sodium (COLACE) 100 mg capsule Take 1 Cap by mouth two (2) times a day.  Stop for loose stools 60 Cap 0       Past History     Past Medical History:  Past Medical History:   Diagnosis Date    Abscess 12/1/2016    Anemia NEC     Bipolar 1 disorder (HCC)     Breast tenderness     bilateral, since 11/2016, on and off     Cancer [facility]) 2008    CERVICAL    Depression     Dizziness     Gastrointestinal disorder     pancreatitis    GERD (gastroesophageal reflux disease)     Increased frequency of headaches     Kidney stones 10/2012    Nausea     Nipple discharge 2009    white/clear discharge since birth of child     Other ill-defined conditions(799.89)     sickle cell trait    Other ill-defined conditions(799.89)     anemia    Psychiatric disorder     depression, bi-polar    Psychotic disorder ([address]     bipolar 1    Sickle cell trait ([address]     Sickle cell trait ([address]     Stomach pain        Past Surgical History:  Past Surgical History:   Procedure Laterality Date    HX GYN      tubaligation    HX GYN      ectopic pregnancy surgery    HX HYSTERECTOMY      HX OTHER SURGICAL      hx of blood transfusions       Family History:  Family History   Problem Relation Age of Onset    Heart Disease Father     Anesth Problems Neg Hx        Social History:  Social History     Tobacco Use    Smoking status: Current Every Day Smoker     Packs/day: 0.25     Years: 9.00     Pack years: 2.25    Smokeless tobacco: Never Used   Substance Use Topics    Alcohol use: No    Drug use: Not Currently     Comment: occasional       Allergies: Allergies   Allergen Reactions    Latex Itching    Cetirizine Vertigo     Other reaction(s): Bradycardia    Tramadol Vertigo    Naproxen Vertigo         Review of Systems   Review of Systems   Constitutional: Negative for fever. Eyes: Negative. Respiratory: Negative for shortness of breath. Cardiovascular: Negative for chest pain. Gastrointestinal: Positive for abdominal pain, constipation, nausea and vomiting. Endocrine: Negative for heat intolerance. Genitourinary: Negative for dysuria. Musculoskeletal: Negative for back pain. Skin: Negative for rash. Allergic/Immunologic: Negative for immunocompromised state.    Neurological: Positive for light-headedness. Hematological: Does not bruise/bleed easily. Psychiatric/Behavioral: Negative. All other systems reviewed and are negative. Physical Exam   Physical Exam  Vitals and nursing note reviewed. Constitutional:       General: She is not in acute distress. Appearance: She is well-developed. HENT:      Head: Normocephalic. Cardiovascular:      Rate and Rhythm: Normal rate and regular rhythm. Heart sounds: Normal heart sounds. Pulmonary:      Effort: Pulmonary effort is normal.      Breath sounds: Normal breath sounds. Abdominal:      General: Bowel sounds are normal.      Palpations: Abdomen is soft. Tenderness: There is abdominal tenderness in the right lower quadrant and left lower quadrant. Musculoskeletal:         General: Normal range of motion. Cervical back: Normal range of motion and neck supple. Skin:     General: Skin is warm and dry. Neurological:      General: No focal deficit present. Mental Status: She is alert and oriented to person, place, and time.    Psychiatric:         Mood and Affect: Mood normal.         Behavior: Behavior normal.         Diagnostic Study Results     Labs -     Recent Results (from the past 12 hour(s))   CBC W/O DIFF    Collection Time: 10/04/21  7:36 PM   Result Value Ref Range    WBC 6.2 3.6 - 11.0 K/uL    RBC 4.07 3.80 - 5.20 M/uL    HGB 12.7 11.5 - 16.0 g/dL    HCT 35.3 35.0 - 47.0 %    MCV 86.7 80.0 - 99.0 FL    MCH 31.2 26.0 - 34.0 PG    MCHC 36.0 30.0 - 36.5 g/dL    RDW 14.6 (H) 11.5 - 14.5 %    PLATELET 856 478 - 625 K/uL    MPV 11.6 8.9 - 12.9 FL    NRBC 0.0 0  WBC    ABSOLUTE NRBC 0.00 0.00 - 4.86 K/uL   METABOLIC PANEL, COMPREHENSIVE    Collection Time: 10/04/21  7:36 PM   Result Value Ref Range    Sodium 139 136 - 145 mmol/L    Potassium 3.6 3.5 - 5.1 mmol/L    Chloride 106 97 - 108 mmol/L    CO2 29 21 - 32 mmol/L    Anion gap 4 (L) 5 - 15 mmol/L    Glucose 97 65 - 100 mg/dL    BUN 14 6 - 20 MG/DL Creatinine 0.89 0.55 - 1.02 MG/DL    BUN/Creatinine ratio 16 12 - 20      GFR est AA >60 >60 ml/min/1.73m2    GFR est non-AA >60 >60 ml/min/1.73m2    Calcium 9.9 8.5 - 10.1 MG/DL    Bilirubin, total 0.9 0.2 - 1.0 MG/DL    ALT (SGPT) 18 12 - 78 U/L    AST (SGOT) 13 (L) 15 - 37 U/L    Alk. phosphatase 82 45 - 117 U/L    Protein, total 8.2 6.4 - 8.2 g/dL    Albumin 4.3 3.5 - 5.0 g/dL    Globulin 3.9 2.0 - 4.0 g/dL    A-G Ratio 1.1 1.1 - 2.2     LIPASE    Collection Time: 10/04/21  7:36 PM   Result Value Ref Range    Lipase 125 73 - 393 U/L   URINALYSIS W/ REFLEX CULTURE    Collection Time: 10/04/21  9:09 PM    Specimen: Urine   Result Value Ref Range    Color YELLOW/STRAW      Appearance CLEAR CLEAR      Specific gravity 1.020 1.003 - 1.030      pH (UA) 5.5 5.0 - 8.0      Protein Negative NEG mg/dL    Glucose Negative NEG mg/dL    Ketone >80 (A) NEG mg/dL    Bilirubin Negative NEG      Blood SMALL (A) NEG      Urobilinogen 0.2 0.2 - 1.0 EU/dL    Nitrites Negative NEG      Leukocyte Esterase Negative NEG      WBC 0-4 0 - 4 /hpf    RBC 0-5 0 - 5 /hpf    Epithelial cells FEW FEW /lpf    Bacteria Negative NEG /hpf    UA:UC IF INDICATED CULTURE NOT INDICATED BY UA RESULT CNI     HCG URINE, QL. - POC    Collection Time: 10/04/21  9:13 PM   Result Value Ref Range    Pregnancy test,urine (POC) Negative NEG         Radiologic Studies -   CT ABD PELV W CONT   Final Result   No acute findings or findings to correlate with abdominal pain. Incidentals as above. XR ABD (KUB)   Final Result   Normal.                       CT Results  (Last 48 hours)               10/04/21 2220  CT ABD PELV W CONT Final result    Impression:  No acute findings or findings to correlate with abdominal pain. Incidentals as above. Narrative:  EXAM: CT ABD PELV W CONT       INDICATION: Abdominal pain and constipation       COMPARISON: CT 8/27/2021. CT 7/20/2021. CONTRAST: 100 mL of Isovue-370.        TECHNIQUE:    Following the uneventful intravenous administration of contrast, thin axial   images were obtained through the abdomen and pelvis. Coronal and sagittal   reconstructions were generated. Oral contrast was not administered. CT dose   reduction was achieved through use of a standardized protocol tailored for this   examination and automatic exposure control for dose modulation. FINDINGS:    LOWER THORAX: No significant change in 4 mm subpleural nodule left lower lobe   (4/7). Lungs are otherwise clear. Visualized heart is normal in size without   pericardial effusion. LIVER: There are scattered subcentimeter hypodensities which are too small to   fully characterize and do not appear significantly changed from prior exams,   statistically likely to be benign. Otherwise unremarkable. BILIARY TREE: Gallbladder is unremarkable. CBD is not dilated. SPLEEN: Unremarkable. PANCREAS: No mass or ductal dilatation. ADRENALS: Unremarkable. KIDNEYS: Previously seen right hydronephrosis has resolved with resultant mild   right renal atrophy as compared to left. No calculus, hydronephrosis, enhancing   mass, or other abnormality. STOMACH: Unremarkable. SMALL BOWEL: No dilatation or wall thickening. COLON: No dilatation or wall thickening. APPENDIX: Not visualized. There is radiodense material along the medial cecal   wall which could reflect suture material from prior appendectomy. PERITONEUM: No ascites or pneumoperitoneum. RETROPERITONEUM: No lymphadenopathy or aortic aneurysm. REPRODUCTIVE ORGANS: Uterus and ovaries are surgically absent. URINARY BLADDER: No mass or calculus. BONES: No acute fracture or aggressive lesion. ABDOMINAL WALL: No mass or hernia. ADDITIONAL COMMENTS: N/A               CXR Results  (Last 48 hours)    None          Medical Decision Making   I am the first provider for this patient.     I reviewed the vital signs, available nursing notes, past medical history, past surgical history, family history and social history. Vital Signs-Reviewed the patient's vital signs. Patient Vitals for the past 12 hrs:   Temp Pulse Resp BP SpO2   10/04/21 2145 -- -- -- -- 99 %   10/04/21 2130 -- -- -- -- 100 %   10/04/21 2115 -- -- -- 129/74 100 %   10/04/21 2100 -- -- -- 132/71 100 %   10/04/21 2050 -- -- -- 121/68 100 %   10/04/21 1924 98.5 °F (36.9 °C) 91 20 (!) 129/90 100 %       Records Reviewed: Nursing Notes and Old Medical Records    Provider Notes (Medical Decision Making):   Constipation, obstruction, dehydration, electrolyte abnormality, UTI    ED Course:   Initial assessment performed. The patients presenting problems have been discussed, and they are in agreement with the care plan formulated and outlined with them. I have encouraged them to ask questions as they arise throughout their visit. Progress note: The patient is feeling better. Her results were reviewed. She is tolerating p.o. She is advised to follow-up and return to ER if worse           Critical Care Time:   0    Disposition:  discharged  DISCHARGE PLAN:  1. Discharge Medication List as of 10/4/2021 11:39 PM        2. Follow-up Information     Follow up With Specialties Details Why Contact Info    Park Hay MD Gastroenterology  As needed [address] [facility]  [address] [facility] [address]  [phone]      [facility] EMERGENCY DEPT Emergency Medicine  If symptoms worsen [address] [facility]  [address] [facility]  [phone]        3. Return to ED if worse     Diagnosis     Clinical Impression:   1. Acute abdominal pain    2. Nausea and vomiting in adult    3. Dehydration    4. Constipation, unspecified constipation type        Attestations:    Duc Mann MD    Please note that this dictation was completed with [email or web address], the [employer] voice recognition software.   Quite often unanticipated grammatical, syntax, homophones, and other interpretive errors are inadvertently transcribed by the computer software. Please disregard these errors. Please excuse any errors that have escaped final proofreading. Thank you.

## 2022-01-04 ENCOUNTER — HOSPITAL ENCOUNTER (EMERGENCY)
Age: 37
Discharge: HOME | End: 2022-01-04
Attending: EMERGENCY MEDICINE
Payer: MEDICAID

## 2022-01-04 VITALS
DIASTOLIC BLOOD PRESSURE: 90 MMHG | SYSTOLIC BLOOD PRESSURE: 129 MMHG | OXYGEN SATURATION: 99 % | RESPIRATION RATE: 16 BRPM | TEMPERATURE: 99.9 F | WEIGHT: 121 LBS | HEART RATE: 100 BPM | HEIGHT: 68 IN | BODY MASS INDEX: 18.34 KG/M2

## 2022-01-04 DIAGNOSIS — Z20.822 SUSPECTED COVID-19 VIRUS INFECTION: Primary | ICD-10-CM

## 2022-01-04 DIAGNOSIS — R35.0 URINARY FREQUENCY: ICD-10-CM

## 2022-01-04 LAB
APPEARANCE UR: CLEAR
BACTERIA URNS QL MICRO: NEGATIVE /HPF
BILIRUB UR QL: NEGATIVE
COLOR UR: ABNORMAL
EPITH CASTS URNS QL MICRO: ABNORMAL /LPF
GLUCOSE UR STRIP.AUTO-MCNC: NEGATIVE MG/DL
HGB UR QL STRIP: ABNORMAL
HYALINE CASTS URNS QL MICRO: ABNORMAL /LPF (ref 0–5)
KETONES UR QL STRIP.AUTO: ABNORMAL MG/DL
LEUKOCYTE ESTERASE UR QL STRIP.AUTO: NEGATIVE
NITRITE UR QL STRIP.AUTO: NEGATIVE
PH UR STRIP: 7 [PH] (ref 5–8)
PROT UR STRIP-MCNC: 30 MG/DL
RBC #/AREA URNS HPF: ABNORMAL /HPF (ref 0–5)
SP GR UR REFRACTOMETRY: 1.01 (ref 1–1.03)
UA: UC IF INDICATED,UAUC: ABNORMAL
UROBILINOGEN UR QL STRIP.AUTO: 1 EU/DL (ref 0.2–1)
WBC URNS QL MICRO: ABNORMAL /HPF (ref 0–4)

## 2022-01-04 PROCEDURE — 81001 URINALYSIS AUTO W/SCOPE: CPT

## 2022-01-04 PROCEDURE — U0005 INFEC AGEN DETEC AMPLI PROBE: HCPCS

## 2022-01-04 PROCEDURE — 99284 EMERGENCY DEPT VISIT MOD MDM: CPT

## 2022-01-04 NOTE — ED PROVIDER NOTES
EMERGENCY DEPARTMENT HISTORY AND PHYSICAL EXAM      Date: 1/4/2022  Patient Name: Rosy Man    History of Presenting Illness     Chief Complaint   Patient presents with    Concern For COVID-19 (Coronavirus)     Multiple symptoms    Bladder Infection     patient added that she can't stop urinating       History Provided By: Patient    HPI: Rosy Man, 39 y.o. female presents to the ED with cc of concern for COVID-19.    39 YOF presents with concern for COVID-19. Patient reports daughter sick with similar symptoms. Patient reports this AM developed \"scratchy throat,\" fever, bilateral ear congestion, cough. No loss of taste or smell. Patient denies abdominal pain, vomiting or diarrhea. Does report bilateral back myalgias, reports increased urinary frequency on ROS and patient is concerned she might have a UTI. Patient presents with daughter requesting COVID test.    There are no other complaints, changes, or physical findings at this time. PCP: None    No current facility-administered medications on file prior to encounter. Current Outpatient Medications on File Prior to Encounter   Medication Sig Dispense Refill    ondansetron (ZOFRAN ODT) 4 mg disintegrating tablet Take 1 Tablet by mouth every eight (8) hours as needed for Nausea. (Patient not taking: Reported on 8/27/2021) 8 Tablet 0    alum-mag hydroxide-simeth (Maalox Advanced) 200-200-20 mg/5 mL susp Take 30 mL by mouth every four (4) hours as needed for Indigestion. (Patient not taking: Reported on 8/27/2021)      medroxyPROGESTERone (PROVERA) 5 mg tablet Take 1 Tab by mouth daily. (Patient not taking: Reported on 8/27/2021) 30 Tab 11    ibuprofen (MOTRIN) 800 mg tablet Take 1 Tab by mouth every eight (8) hours as needed for Pain. (Patient not taking: Reported on 8/27/2021) 40 Tab 0    docusate sodium (COLACE) 100 mg capsule Take 1 Cap by mouth two (2) times a day.  Stop for loose stools 60 Cap 0       Past History     Past Medical History:  Past Medical History:   Diagnosis Date    Abscess 12/1/2016    Anemia NEC     Bipolar 1 disorder ([address]     Breast tenderness     bilateral, since 11/2016, on and off     Cancer [facility]) 2008    CERVICAL    Depression     Dizziness     Gastrointestinal disorder     pancreatitis    GERD (gastroesophageal reflux disease)     Increased frequency of headaches     Kidney stones 10/2012    Nausea     Nipple discharge 2009    white/clear discharge since birth of child     Other ill-defined conditions(799.89)     sickle cell trait    Other ill-defined conditions(799.89)     anemia    Psychiatric disorder     depression, bi-polar    Psychotic disorder ([address]     bipolar 1    Sickle cell trait ([address]     Sickle cell trait ([address]     Stomach pain        Past Surgical History:  Past Surgical History:   Procedure Laterality Date    HX GYN      tubaligation    HX GYN      ectopic pregnancy surgery    HX HYSTERECTOMY      HX OTHER SURGICAL      hx of blood transfusions       Family History:  Family History   Problem Relation Age of Onset    Heart Disease Father     Anesth Problems Neg Hx        Social History:  Social History     Tobacco Use    Smoking status: Current Every Day Smoker     Packs/day: 0.25     Years: 9.00     Pack years: 2.25    Smokeless tobacco: Never Used   Substance Use Topics    Alcohol use: No    Drug use: Not Currently     Comment: occasional       Allergies: Allergies   Allergen Reactions    Latex Itching    Cetirizine Vertigo     Other reaction(s): Bradycardia    Tramadol Vertigo    Naproxen Vertigo         Review of Systems   Review of Systems   Constitutional: Positive for fever. Negative for activity change, chills and fatigue. HENT: Positive for ear pain, rhinorrhea and sore throat. Negative for facial swelling and voice change. Eyes: Negative for redness. Respiratory: Positive for cough. Negative for shortness of breath and wheezing.     Cardiovascular: Negative for chest pain and leg swelling. Gastrointestinal: Negative for abdominal pain, diarrhea, nausea and vomiting. Genitourinary: Negative for decreased urine volume. Musculoskeletal: Positive for myalgias. Negative for gait problem and neck pain. Skin: Negative for pallor, rash and wound. Neurological: Negative for tremors, facial asymmetry and headaches. Hematological: Negative for adenopathy. Psychiatric/Behavioral: Negative for agitation. All other systems reviewed and are negative. Physical Exam   Physical Exam  Vitals and nursing note reviewed. Constitutional:       Comments: 50-year-old female, resting in chair, no distress   HENT:      Head: Normocephalic and atraumatic. Right Ear: Tympanic membrane normal.      Left Ear: Tympanic membrane normal.      Nose: Nose normal.      Mouth/Throat:      Mouth: Mucous membranes are moist.      Pharynx: Posterior oropharyngeal erythema present. No oropharyngeal exudate. Cardiovascular:      Rate and Rhythm: Normal rate and regular rhythm. Heart sounds: No murmur heard. No friction rub. No gallop. Pulmonary:      Effort: Pulmonary effort is normal.      Breath sounds: Normal breath sounds. Abdominal:      Palpations: Abdomen is soft. Tenderness: There is no abdominal tenderness. Musculoskeletal:         General: No swelling or tenderness. Normal range of motion. Cervical back: Normal range of motion. No rigidity or tenderness. Lymphadenopathy:      Cervical: No cervical adenopathy. Skin:     General: Skin is warm. Capillary Refill: Capillary refill takes less than 2 seconds. Neurological:      General: No focal deficit present. Mental Status: She is alert.    Psychiatric:         Mood and Affect: Mood normal.         Diagnostic Study Results     Labs -     Recent Results (from the past 12 hour(s))   URINALYSIS W/ REFLEX CULTURE    Collection Time: 01/04/22  3:21 PM    Specimen: Urine   Result Value Ref Range    Color YELLOW/STRAW      Appearance CLEAR CLEAR      Specific gravity 1.013 1.003 - 1.030      pH (UA) 7.0 5.0 - 8.0      Protein 30 (A) NEG mg/dL    Glucose Negative NEG mg/dL    Ketone TRACE (A) NEG mg/dL    Bilirubin Negative NEG      Blood MODERATE (A) NEG      Urobilinogen 1.0 0.2 - 1.0 EU/dL    Nitrites Negative NEG      Leukocyte Esterase Negative NEG      WBC 0-4 0 - 4 /hpf    RBC 10-20 0 - 5 /hpf    Epithelial cells MODERATE (A) FEW /lpf    Bacteria Negative NEG /hpf    UA:UC IF INDICATED CULTURE NOT INDICATED BY UA RESULT CNI      Hyaline cast 0-2 0 - 5 /lpf       Radiologic Studies -   No orders to display     CT Results  (Last 48 hours)    None        CXR Results  (Last 48 hours)    None          Medical Decision Making   I am the first provider for this patient. I reviewed the vital signs, available nursing notes, past medical history, past surgical history, family history and social history. Vital Signs-Reviewed the patient's vital signs. Patient Vitals for the past 12 hrs:   Temp Pulse Resp BP SpO2   01/04/22 1507 99.9 °F (37.7 °C) 100 16 (!) 129/90 99 %     Records Reviewed: Nursing Notes and Old Medical Records    Provider Notes (Medical Decision Making):     39-year-old female presents emergency department with a chief complaint of concern for COVID-19. She appears well with normal vitals. She has URI symptoms consistent with COVID-19. Given cough, no exudates nor cervical lymphadenopathy, doubt strep pharyngitis. Considerations include viral URI. On ROS does report lower back pain increased urinary frequency. Will check UA, appears nontoxic and my suspicion for pyelonephritis is quite low. She appears comfortable, and I doubt renal colic. ED Course:   Initial assessment performed. The patients presenting problems have been discussed, and they are in agreement with the care plan formulated and outlined with them.   I have encouraged them to ask questions as they arise throughout their visit. ED Course as of 01/04/22 2147   Tue Jan 04, 2022   1600 UA contaminated with epithelial cells but no leukoesterase or blood cells. Trace blood. [MB]      ED Course User Index  [MB] MD Ronnell Nunn MD      Disposition:    Discharged    DISCHARGE PLAN:  1. Discharge Medication List as of 1/4/2022  4:02 PM        2. Follow-up Information     Follow up With Specialties Details Why Contact Info    [facility] EMERGENCY DEPT Emergency Medicine In 3 days  [address] [place] [address]   or your primary care doctor [address] [place] [address]  [phone]        3. Return to ED if worse     Diagnosis     Clinical Impression:   1. Suspected COVID-19 virus infection    2. Urinary frequency        Attestations:    Ronnell Torrez MD    Please note that this dictation was completed with [email or web address], the computer voice recognition software. Quite often unanticipated grammatical, syntax, homophones, and other interpretive errors are inadvertently transcribed by the computer software. Please disregard these errors. Please excuse any errors that have escaped final proofreading. Thank you.

## 2022-01-05 LAB
SARS-COV-2, XPLCVT: DETECTED
SOURCE, COVRS: ABNORMAL

## 2022-01-05 NOTE — PROGRESS NOTES
RE COVID: Patient is seen her results in my chart. Left HIPAA compliant voicemail for her to return call to discuss.

## 2022-01-06 NOTE — PROGRESS NOTES
RE COVID: 2nd attempt made to contact patient. She cannot accept incoming calls. Letter not sent since patient has seen the results in My Chart.

## 2022-07-12 ENCOUNTER — APPOINTMENT (OUTPATIENT)
Dept: GENERAL RADIOLOGY | Age: 37
End: 2022-07-12
Attending: NURSE PRACTITIONER
Payer: MEDICAID

## 2022-07-12 ENCOUNTER — HOSPITAL ENCOUNTER (EMERGENCY)
Age: 37
Discharge: HOME | End: 2022-07-12
Attending: EMERGENCY MEDICINE
Payer: MEDICAID

## 2022-07-12 VITALS
HEART RATE: 90 BPM | OXYGEN SATURATION: 98 % | TEMPERATURE: 98 F | DIASTOLIC BLOOD PRESSURE: 72 MMHG | RESPIRATION RATE: 18 BRPM | SYSTOLIC BLOOD PRESSURE: 111 MMHG

## 2022-07-12 DIAGNOSIS — M41.9 SCOLIOSIS OF LUMBAR SPINE, UNSPECIFIED SCOLIOSIS TYPE: ICD-10-CM

## 2022-07-12 DIAGNOSIS — M54.42 ACUTE LEFT-SIDED LOW BACK PAIN WITH LEFT-SIDED SCIATICA: Primary | ICD-10-CM

## 2022-07-12 PROCEDURE — 72100 X-RAY EXAM L-S SPINE 2/3 VWS: CPT

## 2022-07-12 PROCEDURE — 74011250636 HC RX REV CODE- 250/636: Performed by: NURSE PRACTITIONER

## 2022-07-12 PROCEDURE — 74011250637 HC RX REV CODE- 250/637: Performed by: NURSE PRACTITIONER

## 2022-07-12 PROCEDURE — 96372 THER/PROPH/DIAG INJ SC/IM: CPT

## 2022-07-12 PROCEDURE — 99284 EMERGENCY DEPT VISIT MOD MDM: CPT

## 2022-07-12 RX ORDER — IBUPROFEN 200 MG
800 TABLET ORAL
Qty: 20 TABLET | Refills: 0 | Status: SHIPPED | OUTPATIENT
Start: 2022-07-12

## 2022-07-12 RX ORDER — OXYCODONE AND ACETAMINOPHEN 5; 325 MG/1; MG/1
1 TABLET ORAL
Status: COMPLETED | OUTPATIENT
Start: 2022-07-12 | End: 2022-07-12

## 2022-07-12 RX ORDER — METHYLPREDNISOLONE 4 MG/1
TABLET ORAL
Qty: 1 DOSE PACK | Refills: 0 | Status: SHIPPED | OUTPATIENT
Start: 2022-07-12

## 2022-07-12 RX ORDER — CYCLOBENZAPRINE HCL 10 MG
10 TABLET ORAL
Qty: 12 TABLET | Refills: 0 | Status: SHIPPED | OUTPATIENT
Start: 2022-07-12

## 2022-07-12 RX ORDER — LIDOCAINE 4 G/100G
PATCH TOPICAL
Qty: 10 EACH | Refills: 0 | Status: SHIPPED | OUTPATIENT
Start: 2022-07-12

## 2022-07-12 RX ORDER — KETOROLAC TROMETHAMINE 30 MG/ML
30 INJECTION, SOLUTION INTRAMUSCULAR; INTRAVENOUS
Status: COMPLETED | OUTPATIENT
Start: 2022-07-12 | End: 2022-07-12

## 2022-07-12 RX ORDER — DEXAMETHASONE SODIUM PHOSPHATE 10 MG/ML
10 INJECTION INTRAMUSCULAR; INTRAVENOUS
Status: COMPLETED | OUTPATIENT
Start: 2022-07-12 | End: 2022-07-12

## 2022-07-12 RX ADMIN — DEXAMETHASONE SODIUM PHOSPHATE 10 MG: 10 INJECTION, SOLUTION INTRAMUSCULAR; INTRAVENOUS at 22:16

## 2022-07-12 RX ADMIN — KETOROLAC TROMETHAMINE 30 MG: 30 INJECTION, SOLUTION INTRAMUSCULAR; INTRAVENOUS at 22:16

## 2022-07-12 RX ADMIN — OXYCODONE AND ACETAMINOPHEN 1 TABLET: 5; 325 TABLET ORAL at 22:16

## 2022-07-12 NOTE — Clinical Note
[address]  [address] [facility] [address]  [phone]    Work/School Note    Date: 7/12/2022    To Whom It May concern:    Mary Nascimento was seen and treated today in the emergency room by the following provider(s):  Attending Provider: Monie Linares MD  Nurse Practitioner: Dixon Gant NP. Mary Nascimento is excused from work/school on 07/12/22 and 07/13/22. She is medically clear to return to work/school on 7/14/2022.        Sincerely,          Constanza Bartlett NP

## 2022-07-13 NOTE — ED PROVIDER NOTES
JUAN J   Sebastian Le is a 39 y.o. female with Hx of anemia, bipolar, breast pain, cervical CA, depression, GERD, pancreatitis, kidney stones who presents ambulatory by herself to [facility] ED with cc of lower back pain over several days. Patient reports worsening left lower back pain that radiates down her leg. States the pain worsens with weightbearing and certain range of motion. She states that elevating the leg provides some relief of pain. Denies any history of trauma, falls, injuries. Does not have a history of sciatica. States that she has taken ibuprofen with no relief of pain. Denies chance of pregnancy. Reports tobacco and THC abuse, denies alcohol or other substance abuse. Denies F/C, N/V/D, extremity swelling, cough, congestion, CP, SOB, urinary concerns. PCP: None    There are no other complaints, changes or physical findings at this time.       Past Medical History:   Diagnosis Date    Abscess 12/1/2016    Anemia NEC     Bipolar 1 disorder ([address]     Breast tenderness     bilateral, since 11/2016, on and off     Cancer [facility]) 2008    CERVICAL    Depression     Dizziness     Gastrointestinal disorder     pancreatitis    GERD (gastroesophageal reflux disease)     Increased frequency of headaches     Kidney stones 10/2012    Nausea     Nipple discharge 2009    white/clear discharge since birth of child     Other ill-defined conditions(799.89)     sickle cell trait    Other ill-defined conditions(799.89)     anemia    Psychiatric disorder     depression, bi-polar    Psychotic disorder ([address]     bipolar 1    Sickle cell trait ([address]     Sickle cell trait ([address]     Stomach pain        Past Surgical History:   Procedure Laterality Date    HX GYN      tubaligation    HX GYN      ectopic pregnancy surgery    HX HYSTERECTOMY      HX OTHER SURGICAL      hx of blood transfusions         Family History:   Problem Relation Age of Onset    Heart Disease Father     Anesth Problems Neg Hx        Social History     Socioeconomic History    Marital status: SINGLE     Spouse name: Not on file    Number of children: Not on file    Years of education: Not on file    Highest education level: Not on file   Occupational History    Not on file   Tobacco Use    Smoking status: Current Every Day Smoker     Packs/day: 0.25     Years: 9.00     Pack years: 2.25    Smokeless tobacco: Never Used   Substance and Sexual Activity    Alcohol use: No    Drug use: Not Currently     Comment: occasional    Sexual activity: Yes     Partners: Male     Birth control/protection: Surgical     Comment: Tubal   Other Topics Concern    Not on file   Social History Narrative    Not on file     Social Determinants of Health     Financial Resource Strain:     Difficulty of Paying Living Expenses: Not on file   Food Insecurity:     Worried About Running Out of Food in the Last Year: Not on file    Walt of Food in the Last Year: Not on file   Transportation Needs:     Lack of Transportation (Medical): Not on file    Lack of Transportation (Non-Medical):  Not on file   Physical Activity:     Days of Exercise per Week: Not on file    Minutes of Exercise per Session: Not on file   Stress:     Feeling of Stress : Not on file   Social Connections:     Frequency of Communication with Friends and Family: Not on file    Frequency of Social Gatherings with Friends and Family: Not on file    Attends Religion Services: Not on file    Active Member of Clubs or Organizations: Not on file    Attends Club or Organization Meetings: Not on file    Marital Status: Not on file   Intimate Partner Violence:     Fear of Current or Ex-Partner: Not on file    Emotionally Abused: Not on file    Physically Abused: Not on file    Sexually Abused: Not on file   Housing Stability:     Unable to Pay for Housing in the Last Year: Not on file    Number of [place] in the Last Year: Not on file    Unstable Housing in the Last Year: Not on file         ALLERGIES: Latex, Cetirizine, Tramadol, and Naproxen    Review of Systems   Constitutional: Negative for activity change, appetite change, chills and fever. HENT: Negative for congestion, rhinorrhea, sinus pressure, sneezing and sore throat. Eyes: Negative for visual disturbance. Respiratory: Negative for cough and shortness of breath. Cardiovascular: Negative for chest pain. Gastrointestinal: Negative for abdominal pain, diarrhea, nausea and vomiting. Genitourinary: Negative for dysuria, flank pain, frequency and urgency. Musculoskeletal: Positive for arthralgias, back pain and myalgias. Negative for gait problem, joint swelling and neck pain. Skin: Negative for color change and rash. Neurological: Negative for dizziness, weakness, light-headedness and headaches. Psychiatric/Behavioral: Negative for agitation, behavioral problems and confusion. All other systems reviewed and are negative. Vitals:    07/12/22 2139   BP: 111/72   Resp: 18   Temp: 98 °F (36.7 °C)   SpO2: 98%            Physical Exam  Vitals and nursing note reviewed. Constitutional:       General: She is not in acute distress. Appearance: She is well-developed. HENT:      Head: Normocephalic and atraumatic. Right Ear: External ear normal.      Left Ear: External ear normal.   Eyes:      Conjunctiva/sclera: Conjunctivae normal.      Pupils: Pupils are equal, round, and reactive to light. Cardiovascular:      Rate and Rhythm: Normal rate and regular rhythm. Heart sounds: Normal heart sounds. Pulmonary:      Effort: Pulmonary effort is normal.      Breath sounds: Normal breath sounds. Abdominal:      Palpations: Abdomen is soft. Tenderness: There is no abdominal tenderness. There is no guarding or rebound. Musculoskeletal:         General: Tenderness present. No swelling. Injury: L lower back  Normal range of motion.       Cervical back: Normal range of motion and neck supple. Skin:     General: Skin is warm and dry. Neurological:      Mental Status: She is alert and oriented to person, place, and time. Psychiatric:         Behavior: Behavior normal.         Thought Content: Thought content normal.         Judgment: Judgment normal.          MDM  Number of Diagnoses or Management Options  Acute left-sided low back pain with left-sided sciatica  Scoliosis of lumbar spine, unspecified scoliosis type  Diagnosis management comments: DDx: DDD, sciatica, MSK spasm     Patient presents to the emergency department with left lower back pain that radiates down her leg, with classic presentation for sciatica. Denies any trauma, injuries, falls. Has scoliosis on x-ray, no degenerative changes or other findings. Provided with medication to manage pain in the emergency department with reported improvement of symptoms. Encouraged to follow-up with primary care provider as soon as possible for ongoing management. Reasons to return to the emergency department were reviewed. Amount and/or Complexity of Data Reviewed  Tests in the radiology section of CPT®: ordered and reviewed  Review and summarize past medical records: yes           Procedures    LABORATORY TESTS:  No results found for this or any previous visit (from the past 12 hour(s)). IMAGING RESULTS:  XR SPINE LUMB 2 OR 3 V   Final Result   Slight scoliosis without acute abnormality. MEDICATIONS GIVEN:  Medications   ketorolac (TORADOL) injection 30 mg (30 mg IntraMUSCular Given 7/12/22 2216)   dexamethasone (PF) (DECADRON) 10 mg/mL Oral 10 mg (10 mg Oral Given 7/12/22 2216)   oxyCODONE-acetaminophen (PERCOCET) 5-325 mg per tablet 1 Tablet (1 Tablet Oral Given 7/12/22 2216)       IMPRESSION:  1. Acute left-sided low back pain with left-sided sciatica    2. Scoliosis of lumbar spine, unspecified scoliosis type        PLAN:  1.    Discharge Medication List as of 7/12/2022 10:48 PM      START taking these medications    Details   methylPREDNISolone (Medrol, Jesus,) 4 mg tablet Take by mouth as directed, start taking 7/14/22, Print, Disp-1 Dose Pack, R-0      cyclobenzaprine (FLEXERIL) 10 mg tablet Take 1 Tablet by mouth three (3) times daily as needed for Muscle Spasm(s). , Print, Disp-12 Tablet, R-0      ibuprofen (MOTRIN) 200 mg tablet Take 4 Tablets by mouth every six (6) hours as needed for Pain., Print, Disp-20 Tablet, R-0      lidocaine 4 % patch Apply to left, lower back for 12 hours, then remove patch for 12 hours. , Print, Disp-10 Each, R-0         CONTINUE these medications which have NOT CHANGED    Details   ondansetron (ZOFRAN ODT) 4 mg disintegrating tablet Take 1 Tablet by mouth every eight (8) hours as needed for Nausea., Normal, Disp-8 Tablet, R-0      alum-mag hydroxide-simeth (Maalox Advanced) 200-200-20 mg/5 mL susp Take 30 mL by mouth every four (4) hours as needed for Indigestion. , Historical Med      medroxyPROGESTERone (PROVERA) 5 mg tablet Take 1 Tab by mouth daily. , Normal, Disp-30 Tab,R-11      docusate sodium (COLACE) 100 mg capsule Take 1 Cap by mouth two (2) times a day. Stop for loose stools, Normal, Disp-60 Cap,R-0           2. Follow-up Information     Follow up With Specialties Details Why Contact Info    [address] [facility] Go to  As needed, If symptoms worsen [address]  [phone]        3.  Return to ED if worse

## 2022-07-13 NOTE — DISCHARGE INSTRUCTIONS
X-ray shows sciolosis (curvature of the spine) but no degenerative disc disease. Take medications as directed. I would take ibuprofen 800 mg every 6 hours for the next 24 hours. You can also use 325 of Tylenol every 4 hours to help relieve pain as well. Additionally, a steroid pack and muscle relaxants were prescribed. Also, a pain patch was prescribed as well. Please follow-up with a primary care provider as soon as possible, sciatica is often something that can recur. Place warm compresses on your back. Return to the emergency department for any worsening or worrisome symptoms.

## 2025-06-02 NOTE — DISCHARGE INSTRUCTIONS
Patient Education        Blood in the Urine: Care Instructions  Your Care Instructions    Blood in the urine, or hematuria, may make the urine look red, brown, or pink. There may be blood every time you urinate or just from time to time. You cannot always see blood in the urine, but it will show up in a urine test.  Blood in the urine may be serious. It should always be checked by a doctor. Your doctor may recommend more tests, including an X-ray, a CT scan, or a cystoscopy (which lets a doctor look inside the urethra and bladder). Blood in the urine can be a sign of another problem. Common causes are bladder infections and kidney stones. An injury to your groin or your genital area can also cause bleeding in the urinary tract. Very hard exercise--such as running a marathon--can cause blood in the urine. Blood in the urine can also be a sign of kidney disease or cancer in the bladder or kidney. Many cases of blood in the urine are caused by a harmless condition that runs in families. This is called benign familial hematuria. It does not need any treatment. Sometimes your urine may look red or brown even though it does not contain blood. For example, not getting enough fluids (dehydration), taking certain medicines, or having a liver problem can change the color of your urine. Eating foods such as beets, rhubarb, or blackberries or foods with red food coloring can make your urine look red or pink. Follow-up care is a key part of your treatment and safety. Be sure to make and go to all appointments, and call your doctor if you are having problems. It's also a good idea to know your test results and keep a list of the medicines you take. When should you call for help? Call your doctor now or seek immediate medical care if:    · You have symptoms of a urinary infection. For example:  ? You have pus in your urine. ? You have pain in your back just below your rib cage. This is called flank pain. ?  You have a fever, chills, or body aches. ? It hurts to urinate. ? You have groin or belly pain.     · You have more blood in your urine.    Watch closely for changes in your health, and be sure to contact your doctor if:    · You have new urination problems.     · You do not get better as expected. Where can you learn more? Go to http://roula-susan.info/. Enter Y113 in the search box to learn more about \"Blood in the Urine: Care Instructions. \"  Current as of: March 20, 2018  Content Version: 11.9  © 3700-3594 [employer]. Care instructions adapted under license by [employer] (which disclaims liability or warranty for this information). If you have questions about a medical condition or this instruction, always ask your healthcare professional. [address] any warranty or liability for your use of this information. Patient Education        Hemorrhagic Ovarian Cyst: Care Instructions  Your Care Instructions    Sometimes a sac forms on the surface of a woman's ovary. When the sac swells up with fluid, it forms a cyst. If the cyst bleeds, it is called a hemorrhagic (say \"sau-nvj-AN-jick\") ovarian cyst. If the cyst breaks open, blood and fluid spill out into the lower belly and pelvis. You may not have symptoms from the cyst. But if it is large, or if it twists or breaks open, you may have pain or other problems. You may feel pain from the cyst or have symptoms from losing blood. Your doctor may use a pelvic ultrasound to see if you have a cyst. Blood tests can help your doctor tell if the cyst is bleeding or you have lost a lot of blood. Treatment depends on your symptoms. If they are mild, your doctor may suggest carefully watching your symptoms and doing blood tests again. But if you have a cyst that is very large, bleeds a lot, or causes other problems, your doctor may suggest surgery to remove it.  If the bleeding is heavy, you may also need treatment to replace the blood. Follow-up care is a key part of your treatment and safety. Be sure to make and go to all appointments, and call your doctor if you are having problems. It's also a good idea to know your test results and keep a list of the medicines you take. How can you care for yourself at home? · Use heat, such as a warm water bottle, a heating pad set on low, or a warm bath, to relax tense muscles and relieve cramping. · Be safe with medicines. Read and follow all instructions on the label. ? If the doctor gave you a prescription medicine for pain, take it as prescribed. ? If you are not taking a prescription pain medicine, ask your doctor if you can take an over-the-counter medicine. When should you call for help? Call 911 anytime you think you may need emergency care. For example, call if:    · You passed out (lost consciousness).    Call your doctor now or seek immediate medical care if:    · You have severe vaginal bleeding.     · You are dizzy or lightheaded, or you feel like you may faint.     · You have new or worse pain in your belly or pelvis.    Watch closely for changes in your health, and be sure to contact your doctor if:    · You think you may be pregnant.     · You do not get better as expected. Where can you learn more? Go to http://roula-susan.info/. Enter D256 in the search box to learn more about \"Hemorrhagic Ovarian Cyst: Care Instructions. \"  Current as of: May 14, 2018  Content Version: 11.9  © 0185-1433 [employer]. Care instructions adapted under license by [employer] (which disclaims liability or warranty for this information). If you have questions about a medical condition or this instruction, always ask your healthcare professional. [address] any warranty or liability for your use of this information. No

## (undated) DEVICE — TUBING, SUCTION, 1/4" X 12', STRAIGHT: Brand: MEDLINE

## (undated) DEVICE — TK® TI-KNOT® DEVICE: Brand: TK® TI-KNOT®

## (undated) DEVICE — CYSTO/BLADDER IRRIGATION SET, REGULATING CLAMP

## (undated) DEVICE — TROCAR: Brand: KII® SLEEVE

## (undated) DEVICE — RD® QUICK LOAD® SURGICAL SUTURE, 2-0 MONOGLIDE®, ABSORBABLE, 53", PURPLE, MONOFILAMENT: Brand: RD® QUICK LOAD®

## (undated) DEVICE — STERILE POLYISOPRENE POWDER-FREE SURGICAL GLOVES WITH EMOLLIENT COATING: Brand: PROTEXIS

## (undated) DEVICE — POWDER HEMOSTAT GEL 3.0GR -- SURGICEL

## (undated) DEVICE — SCISSORS ENDOSCP DIA5MM CRV MPLR CAUT W/ RATCH HNDL

## (undated) DEVICE — VCARE MEDIUM, UTERINE MANIPULATOR, VAGINAL-CERVICAL-AHLUWALIA'S-RETRACTOR-ELEVATOR: Brand: VCARE

## (undated) DEVICE — Device

## (undated) DEVICE — GARMENT,MEDLINE,DVT,INT,CALF,MED, GEN2: Brand: MEDLINE

## (undated) DEVICE — SURGICAL PROCEDURE PACK GYN LAPAROSCOPY CUST SMH LF

## (undated) DEVICE — PAD,SANITARY,11 IN,MAXI,N-STRL,IND WRAP: Brand: MEDLINE

## (undated) DEVICE — SUTURE MCRYL SZ 4-0 L27IN ABSRB UD L19MM PS-2 1/2 CIR PRIM Y426H

## (undated) DEVICE — DERMABOND SKIN ADH 0.7ML -- DERMABOND ADVANCED 12/BX

## (undated) DEVICE — TROCAR: Brand: KII® OPTICAL ACCESS SYSTEM

## (undated) DEVICE — STRAP,POSITIONING,KNEE/BODY,FOAM,4X60": Brand: MEDLINE

## (undated) DEVICE — PAD PT POS 36 IN SURGYPAD DISP

## (undated) DEVICE — REM POLYHESIVE ADULT PATIENT RETURN ELECTRODE: Brand: VALLEYLAB

## (undated) DEVICE — COVER LT HNDL PLAS RIG 1 PER PK

## (undated) DEVICE — SURGICEL ENDOSCP APPL

## (undated) DEVICE — LAPAROSCOPIC TROCAR SLEEVE/SINGLE USE: Brand: KII® OPTICAL ACCESS SYSTEM

## (undated) DEVICE — INFECTION CONTROL KIT SYS

## (undated) DEVICE — PREP SKN CHLRAPRP APL 26ML STR --

## (undated) DEVICE — RELOAD STPL L45MM H1.5-3.6MM REG TISS BLU GRIPPING SURF B

## (undated) DEVICE — TISSUE RETRIEVAL SYSTEM: Brand: INZII RETRIEVAL SYSTEM

## (undated) DEVICE — BLADE ASSEMB CLP HAIR FINE --

## (undated) DEVICE — TRAY PREP DRY W/ PREM GLV 2 APPL 6 SPNG 2 UNDPD 1 OVERWRAP

## (undated) DEVICE — STAPLER INT L340MM 45MM STD 12 FIRING B FRM PWR + GRIPPING

## (undated) DEVICE — 4-PORT MANIFOLD: Brand: NEPTUNE 2

## (undated) DEVICE — SHEAR HARMONIC ACET 5MMX36CM -- ACE PLUS

## (undated) DEVICE — GUIDEWIRE URO L150CM DIA0.035IN TAPR 3CM NIT HYDRPHLC ANG

## (undated) DEVICE — FILTER SMK EVAC FLO CLR MEGADYNE

## (undated) DEVICE — TOTAL TRAY, DB, 100% SILI FOLEY, 16FR 10: Brand: MEDLINE

## (undated) DEVICE — SOLUTION IV 1000ML 0.9% SOD CHL

## (undated) DEVICE — SUTURE VCRL SZ 0 L36IN ABSRB VLT L36MM CT-1 1/2 CIR J346H

## (undated) DEVICE — TK® QUICK LOAD® UNIT: Brand: TK® QUICK LOAD®

## (undated) DEVICE — DRAPE,REIN 53X77,STERILE: Brand: MEDLINE

## (undated) DEVICE — 5MM RD180® DEVICE: Brand: RD180® - THE RUNNING DEVICE®

## (undated) DEVICE — GLOVE SURG SZ 75 L1212IN FNGR THK138MIL BRN LTX FREE

## (undated) DEVICE — SYR 50ML LR LCK 1ML GRAD NSAF --